# Patient Record
Sex: FEMALE | Race: OTHER | NOT HISPANIC OR LATINO | URBAN - METROPOLITAN AREA
[De-identification: names, ages, dates, MRNs, and addresses within clinical notes are randomized per-mention and may not be internally consistent; named-entity substitution may affect disease eponyms.]

---

## 2018-09-22 ENCOUNTER — OUTPATIENT (OUTPATIENT)
Dept: OUTPATIENT SERVICES | Facility: HOSPITAL | Age: 33
LOS: 1 days | End: 2018-09-22
Payer: MEDICAID

## 2018-09-22 ENCOUNTER — TRANSCRIPTION ENCOUNTER (OUTPATIENT)
Age: 33
End: 2018-09-22

## 2018-09-22 DIAGNOSIS — O26.899 OTHER SPECIFIED PREGNANCY RELATED CONDITIONS, UNSPECIFIED TRIMESTER: ICD-10-CM

## 2018-09-22 DIAGNOSIS — Z3A.00 WEEKS OF GESTATION OF PREGNANCY NOT SPECIFIED: ICD-10-CM

## 2018-09-22 LAB
ALBUMIN SERPL ELPH-MCNC: 2.8 G/DL — LOW (ref 3.5–5)
ALP SERPL-CCNC: 118 U/L — SIGNIFICANT CHANGE UP (ref 40–120)
ALT FLD-CCNC: 22 U/L DA — SIGNIFICANT CHANGE UP (ref 10–60)
ANION GAP SERPL CALC-SCNC: 11 MMOL/L — SIGNIFICANT CHANGE UP (ref 5–17)
APTT BLD: 31 SEC — SIGNIFICANT CHANGE UP (ref 27.5–37.4)
AST SERPL-CCNC: 14 U/L — SIGNIFICANT CHANGE UP (ref 10–40)
BILIRUB SERPL-MCNC: 0.5 MG/DL — SIGNIFICANT CHANGE UP (ref 0.2–1.2)
BUN SERPL-MCNC: 11 MG/DL — SIGNIFICANT CHANGE UP (ref 7–18)
CALCIUM SERPL-MCNC: 9 MG/DL — SIGNIFICANT CHANGE UP (ref 8.4–10.5)
CHLORIDE SERPL-SCNC: 107 MMOL/L — SIGNIFICANT CHANGE UP (ref 96–108)
CO2 SERPL-SCNC: 22 MMOL/L — SIGNIFICANT CHANGE UP (ref 22–31)
CREAT SERPL-MCNC: 0.58 MG/DL — SIGNIFICANT CHANGE UP (ref 0.5–1.3)
FIBRINOGEN PPP-MCNC: 624 MG/DL — HIGH (ref 310–510)
GLUCOSE SERPL-MCNC: 101 MG/DL — HIGH (ref 70–99)
HCT VFR BLD CALC: 46.7 % — HIGH (ref 34.5–45)
HGB BLD-MCNC: 14.9 G/DL — SIGNIFICANT CHANGE UP (ref 11.5–15.5)
INR BLD: 0.96 RATIO — SIGNIFICANT CHANGE UP (ref 0.88–1.16)
MCHC RBC-ENTMCNC: 29.8 PG — SIGNIFICANT CHANGE UP (ref 27–34)
MCHC RBC-ENTMCNC: 31.9 GM/DL — LOW (ref 32–36)
MCV RBC AUTO: 93.6 FL — SIGNIFICANT CHANGE UP (ref 80–100)
PLATELET # BLD AUTO: 282 K/UL — SIGNIFICANT CHANGE UP (ref 150–400)
POTASSIUM SERPL-MCNC: 4 MMOL/L — SIGNIFICANT CHANGE UP (ref 3.5–5.3)
POTASSIUM SERPL-SCNC: 4 MMOL/L — SIGNIFICANT CHANGE UP (ref 3.5–5.3)
PROT SERPL-MCNC: 7.5 G/DL — SIGNIFICANT CHANGE UP (ref 6–8.3)
PROTHROM AB SERPL-ACNC: 10.5 SEC — SIGNIFICANT CHANGE UP (ref 9.8–12.7)
RBC # BLD: 4.99 M/UL — SIGNIFICANT CHANGE UP (ref 3.8–5.2)
RBC # FLD: 13.3 % — SIGNIFICANT CHANGE UP (ref 10.3–14.5)
SODIUM SERPL-SCNC: 140 MMOL/L — SIGNIFICANT CHANGE UP (ref 135–145)
WBC # BLD: 10.2 K/UL — SIGNIFICANT CHANGE UP (ref 3.8–10.5)
WBC # FLD AUTO: 10.2 K/UL — SIGNIFICANT CHANGE UP (ref 3.8–10.5)

## 2018-09-23 ENCOUNTER — INPATIENT (INPATIENT)
Facility: HOSPITAL | Age: 33
LOS: 2 days | Discharge: HOME | End: 2018-09-26
Attending: OBSTETRICS & GYNECOLOGY | Admitting: OBSTETRICS & GYNECOLOGY
Payer: MEDICAID

## 2018-09-23 VITALS — HEIGHT: 67 IN | WEIGHT: 220.46 LBS

## 2018-09-23 DIAGNOSIS — Z3A.39 39 WEEKS GESTATION OF PREGNANCY: ICD-10-CM

## 2018-09-23 LAB
ABO RH CONFIRMATION: SIGNIFICANT CHANGE UP
T PALLIDUM AB TITR SER: NEGATIVE — SIGNIFICANT CHANGE UP

## 2018-09-23 PROCEDURE — 85730 THROMBOPLASTIN TIME PARTIAL: CPT

## 2018-09-23 PROCEDURE — 80053 COMPREHEN METABOLIC PANEL: CPT

## 2018-09-23 PROCEDURE — 86850 RBC ANTIBODY SCREEN: CPT

## 2018-09-23 PROCEDURE — G0463: CPT

## 2018-09-23 PROCEDURE — 86780 TREPONEMA PALLIDUM: CPT

## 2018-09-23 PROCEDURE — 85384 FIBRINOGEN ACTIVITY: CPT

## 2018-09-23 PROCEDURE — 86900 BLOOD TYPING SEROLOGIC ABO: CPT

## 2018-09-23 PROCEDURE — 85610 PROTHROMBIN TIME: CPT

## 2018-09-23 PROCEDURE — 59025 FETAL NON-STRESS TEST: CPT

## 2018-09-23 PROCEDURE — 85027 COMPLETE CBC AUTOMATED: CPT

## 2018-09-23 PROCEDURE — 86923 COMPATIBILITY TEST ELECTRIC: CPT

## 2018-09-23 PROCEDURE — 86901 BLOOD TYPING SEROLOGIC RH(D): CPT

## 2018-09-23 PROCEDURE — 36415 COLL VENOUS BLD VENIPUNCTURE: CPT

## 2018-09-23 RX ORDER — TETANUS TOXOID, REDUCED DIPHTHERIA TOXOID AND ACELLULAR PERTUSSIS VACCINE, ADSORBED 5; 2.5; 8; 8; 2.5 [IU]/.5ML; [IU]/.5ML; UG/.5ML; UG/.5ML; UG/.5ML
0.5 SUSPENSION INTRAMUSCULAR ONCE
Qty: 0 | Refills: 0 | Status: DISCONTINUED | OUTPATIENT
Start: 2018-09-23 | End: 2018-09-26

## 2018-09-23 RX ORDER — OXYTOCIN 10 UNIT/ML
41.67 VIAL (ML) INJECTION
Qty: 20 | Refills: 0 | Status: DISCONTINUED | OUTPATIENT
Start: 2018-09-23 | End: 2018-09-26

## 2018-09-23 RX ORDER — SIMETHICONE 80 MG/1
80 TABLET, CHEWABLE ORAL EVERY 4 HOURS
Qty: 0 | Refills: 0 | Status: DISCONTINUED | OUTPATIENT
Start: 2018-09-23 | End: 2018-09-26

## 2018-09-23 RX ORDER — DIPHENHYDRAMINE HCL 50 MG
25 CAPSULE ORAL EVERY 6 HOURS
Qty: 0 | Refills: 0 | Status: DISCONTINUED | OUTPATIENT
Start: 2018-09-23 | End: 2018-09-26

## 2018-09-23 RX ORDER — CITRIC ACID/SODIUM CITRATE 300-500 MG
30 SOLUTION, ORAL ORAL ONCE
Qty: 0 | Refills: 0 | Status: COMPLETED | OUTPATIENT
Start: 2018-09-23 | End: 2018-09-23

## 2018-09-23 RX ORDER — HEPARIN SODIUM 5000 [USP'U]/ML
5000 INJECTION INTRAVENOUS; SUBCUTANEOUS EVERY 12 HOURS
Qty: 0 | Refills: 0 | Status: DISCONTINUED | OUTPATIENT
Start: 2018-09-23 | End: 2018-09-26

## 2018-09-23 RX ORDER — KETOROLAC TROMETHAMINE 30 MG/ML
30 SYRINGE (ML) INJECTION EVERY 6 HOURS
Qty: 0 | Refills: 0 | Status: DISCONTINUED | OUTPATIENT
Start: 2018-09-23 | End: 2018-09-24

## 2018-09-23 RX ORDER — METOCLOPRAMIDE HCL 10 MG
10 TABLET ORAL ONCE
Qty: 0 | Refills: 0 | Status: DISCONTINUED | OUTPATIENT
Start: 2018-09-23 | End: 2018-09-26

## 2018-09-23 RX ORDER — GLYCERIN ADULT
1 SUPPOSITORY, RECTAL RECTAL AT BEDTIME
Qty: 0 | Refills: 0 | Status: DISCONTINUED | OUTPATIENT
Start: 2018-09-23 | End: 2018-09-26

## 2018-09-23 RX ORDER — FERROUS SULFATE 325(65) MG
325 TABLET ORAL DAILY
Qty: 0 | Refills: 0 | Status: DISCONTINUED | OUTPATIENT
Start: 2018-09-23 | End: 2018-09-26

## 2018-09-23 RX ORDER — CITRIC ACID/SODIUM CITRATE 300-500 MG
30 SOLUTION, ORAL ORAL ONCE
Qty: 0 | Refills: 0 | Status: DISCONTINUED | OUTPATIENT
Start: 2018-09-23 | End: 2018-09-26

## 2018-09-23 RX ORDER — CEFAZOLIN SODIUM 1 G
2000 VIAL (EA) INJECTION ONCE
Qty: 0 | Refills: 0 | Status: COMPLETED | OUTPATIENT
Start: 2018-09-23 | End: 2018-09-23

## 2018-09-23 RX ORDER — SODIUM CHLORIDE 9 MG/ML
1000 INJECTION, SOLUTION INTRAVENOUS
Qty: 0 | Refills: 0 | Status: DISCONTINUED | OUTPATIENT
Start: 2018-09-23 | End: 2018-09-26

## 2018-09-23 RX ORDER — LANOLIN
1 OINTMENT (GRAM) TOPICAL
Qty: 0 | Refills: 0 | Status: DISCONTINUED | OUTPATIENT
Start: 2018-09-23 | End: 2018-09-26

## 2018-09-23 RX ORDER — SODIUM CHLORIDE 9 MG/ML
1000 INJECTION, SOLUTION INTRAVENOUS ONCE
Qty: 0 | Refills: 0 | Status: COMPLETED | OUTPATIENT
Start: 2018-09-23 | End: 2018-09-23

## 2018-09-23 RX ORDER — DOCUSATE SODIUM 100 MG
100 CAPSULE ORAL
Qty: 0 | Refills: 0 | Status: DISCONTINUED | OUTPATIENT
Start: 2018-09-23 | End: 2018-09-24

## 2018-09-23 RX ORDER — ACETAMINOPHEN 500 MG
1000 TABLET ORAL ONCE
Qty: 0 | Refills: 0 | Status: COMPLETED | OUTPATIENT
Start: 2018-09-23 | End: 2018-09-24

## 2018-09-23 RX ADMIN — SODIUM CHLORIDE 2000 MILLILITER(S): 9 INJECTION, SOLUTION INTRAVENOUS at 06:30

## 2018-09-23 RX ADMIN — Medication 30 MILLIGRAM(S): at 23:31

## 2018-09-23 RX ADMIN — HEPARIN SODIUM 5000 UNIT(S): 5000 INJECTION INTRAVENOUS; SUBCUTANEOUS at 22:12

## 2018-09-23 RX ADMIN — Medication 100 MILLIGRAM(S): at 07:37

## 2018-09-23 RX ADMIN — Medication 30 MILLIGRAM(S): at 15:07

## 2018-09-23 RX ADMIN — Medication 30 MILLIGRAM(S): at 15:37

## 2018-09-23 RX ADMIN — Medication 125 MILLIUNIT(S)/MIN: at 08:31

## 2018-09-23 RX ADMIN — Medication 30 MILLILITER(S): at 07:38

## 2018-09-23 NOTE — PROGRESS NOTE ADULT - SUBJECTIVE AND OBJECTIVE BOX
OB Post Op Note    Patient seen resting comfortably.  No new complaints.  Denies HA, CP, SOB, N/V/D, dizziness, palpitations, worsening abdominal pain, worsening vaginal bleeding, or any other concerns.  Attempting breastfeeding.      Vital Signs Last 24 Hrs  T(C): 36.5 (23 Sep 2018 11:49), Max: 36.5 (23 Sep 2018 09:16)  T(F): 97.7 (23 Sep 2018 11:49), Max: 97.7 (23 Sep 2018 09:16)  HR: 96 (23 Sep 2018 11:49) (93 - 101)  BP: 125/57 (23 Sep 2018 11:49) (106/58 - 125/57)  RR: 18 (23 Sep 2018 11:49) (15 - 18)  SpO2: 100% (23 Sep 2018 11:49) (98% - 100%)    Gen: A&O x 3, NAD  Chest: CTABL  Cardiac: S1+S2+ RRR  Breast: Soft, nontender, nonengorged  Abdomen: Nontender, nondistended, +BS, Dressing C/D/I  Gyn: Minimal bleeding  Extremities: Nontender, DTRS 2+, no worsening edema, venodynes intact        A/P:  Patient s/p  section, POD #0.     -Pain management as needed  -Advance as per protocol  -OOB and ambulate  -Repeat CBC   -DC bergman and trial of void  -Advance diet with flatus  -Encourage breastfeeding

## 2018-09-24 LAB
BASOPHILS # BLD AUTO: 0.1 K/UL — SIGNIFICANT CHANGE UP (ref 0–0.2)
BASOPHILS # BLD AUTO: 0.1 K/UL — SIGNIFICANT CHANGE UP (ref 0–0.2)
BASOPHILS NFR BLD AUTO: 0.5 % — SIGNIFICANT CHANGE UP (ref 0–2)
BASOPHILS NFR BLD AUTO: 0.7 % — SIGNIFICANT CHANGE UP (ref 0–2)
EOSINOPHIL # BLD AUTO: 0.1 K/UL — SIGNIFICANT CHANGE UP (ref 0–0.5)
EOSINOPHIL # BLD AUTO: 0.2 K/UL — SIGNIFICANT CHANGE UP (ref 0–0.5)
EOSINOPHIL NFR BLD AUTO: 1.3 % — SIGNIFICANT CHANGE UP (ref 0–6)
EOSINOPHIL NFR BLD AUTO: 1.9 % — SIGNIFICANT CHANGE UP (ref 0–6)
HCT VFR BLD CALC: 38.1 % — SIGNIFICANT CHANGE UP (ref 34.5–45)
HCT VFR BLD CALC: 38.8 % — SIGNIFICANT CHANGE UP (ref 34.5–45)
HGB BLD-MCNC: 12.2 G/DL — SIGNIFICANT CHANGE UP (ref 11.5–15.5)
HGB BLD-MCNC: 12.6 G/DL — SIGNIFICANT CHANGE UP (ref 11.5–15.5)
LYMPHOCYTES # BLD AUTO: 18.7 % — SIGNIFICANT CHANGE UP (ref 13–44)
LYMPHOCYTES # BLD AUTO: 2 K/UL — SIGNIFICANT CHANGE UP (ref 1–3.3)
LYMPHOCYTES # BLD AUTO: 2.6 K/UL — SIGNIFICANT CHANGE UP (ref 1–3.3)
LYMPHOCYTES # BLD AUTO: 25 % — SIGNIFICANT CHANGE UP (ref 13–44)
MCHC RBC-ENTMCNC: 29.6 PG — SIGNIFICANT CHANGE UP (ref 27–34)
MCHC RBC-ENTMCNC: 30.6 PG — SIGNIFICANT CHANGE UP (ref 27–34)
MCHC RBC-ENTMCNC: 31.5 GM/DL — LOW (ref 32–36)
MCHC RBC-ENTMCNC: 33.2 GM/DL — SIGNIFICANT CHANGE UP (ref 32–36)
MCV RBC AUTO: 92.4 FL — SIGNIFICANT CHANGE UP (ref 80–100)
MCV RBC AUTO: 94.1 FL — SIGNIFICANT CHANGE UP (ref 80–100)
MONOCYTES # BLD AUTO: 0.8 K/UL — SIGNIFICANT CHANGE UP (ref 0–0.9)
MONOCYTES # BLD AUTO: 0.9 K/UL — SIGNIFICANT CHANGE UP (ref 0–0.9)
MONOCYTES NFR BLD AUTO: 8 % — SIGNIFICANT CHANGE UP (ref 2–14)
MONOCYTES NFR BLD AUTO: 8.7 % — SIGNIFICANT CHANGE UP (ref 2–14)
NEUTROPHILS # BLD AUTO: 6.6 K/UL — SIGNIFICANT CHANGE UP (ref 1.8–7.4)
NEUTROPHILS # BLD AUTO: 7.5 K/UL — HIGH (ref 1.8–7.4)
NEUTROPHILS NFR BLD AUTO: 63.6 % — SIGNIFICANT CHANGE UP (ref 43–77)
NEUTROPHILS NFR BLD AUTO: 71.4 % — SIGNIFICANT CHANGE UP (ref 43–77)
PLATELET # BLD AUTO: 244 K/UL — SIGNIFICANT CHANGE UP (ref 150–400)
PLATELET # BLD AUTO: 259 K/UL — SIGNIFICANT CHANGE UP (ref 150–400)
RBC # BLD: 4.12 M/UL — SIGNIFICANT CHANGE UP (ref 3.8–5.2)
RBC # BLD: 4.13 M/UL — SIGNIFICANT CHANGE UP (ref 3.8–5.2)
RBC # FLD: 13 % — SIGNIFICANT CHANGE UP (ref 10.3–14.5)
RBC # FLD: 13.2 % — SIGNIFICANT CHANGE UP (ref 10.3–14.5)
WBC # BLD: 10.4 K/UL — SIGNIFICANT CHANGE UP (ref 3.8–10.5)
WBC # BLD: 10.5 K/UL — SIGNIFICANT CHANGE UP (ref 3.8–10.5)
WBC # FLD AUTO: 10.4 K/UL — SIGNIFICANT CHANGE UP (ref 3.8–10.5)
WBC # FLD AUTO: 10.5 K/UL — SIGNIFICANT CHANGE UP (ref 3.8–10.5)

## 2018-09-24 RX ORDER — MAGNESIUM HYDROXIDE 400 MG/1
30 TABLET, CHEWABLE ORAL DAILY
Qty: 0 | Refills: 0 | Status: DISCONTINUED | OUTPATIENT
Start: 2018-09-25 | End: 2018-09-26

## 2018-09-24 RX ORDER — OXYCODONE AND ACETAMINOPHEN 5; 325 MG/1; MG/1
2 TABLET ORAL EVERY 6 HOURS
Qty: 0 | Refills: 0 | Status: DISCONTINUED | OUTPATIENT
Start: 2018-09-24 | End: 2018-09-26

## 2018-09-24 RX ORDER — SENNA PLUS 8.6 MG/1
2 TABLET ORAL AT BEDTIME
Qty: 0 | Refills: 0 | Status: DISCONTINUED | OUTPATIENT
Start: 2018-09-24 | End: 2018-09-26

## 2018-09-24 RX ORDER — OXYCODONE AND ACETAMINOPHEN 5; 325 MG/1; MG/1
1 TABLET ORAL EVERY 4 HOURS
Qty: 0 | Refills: 0 | Status: DISCONTINUED | OUTPATIENT
Start: 2018-09-24 | End: 2018-09-26

## 2018-09-24 RX ORDER — IBUPROFEN 200 MG
600 TABLET ORAL EVERY 6 HOURS
Qty: 0 | Refills: 0 | Status: DISCONTINUED | OUTPATIENT
Start: 2018-09-24 | End: 2018-09-26

## 2018-09-24 RX ORDER — DOCUSATE SODIUM 100 MG
100 CAPSULE ORAL
Qty: 0 | Refills: 0 | Status: DISCONTINUED | OUTPATIENT
Start: 2018-09-24 | End: 2018-09-26

## 2018-09-24 RX ADMIN — OXYCODONE AND ACETAMINOPHEN 1 TABLET(S): 5; 325 TABLET ORAL at 15:30

## 2018-09-24 RX ADMIN — SENNA PLUS 2 TABLET(S): 8.6 TABLET ORAL at 22:18

## 2018-09-24 RX ADMIN — Medication 30 MILLIGRAM(S): at 06:09

## 2018-09-24 RX ADMIN — HEPARIN SODIUM 5000 UNIT(S): 5000 INJECTION INTRAVENOUS; SUBCUTANEOUS at 10:33

## 2018-09-24 RX ADMIN — OXYCODONE AND ACETAMINOPHEN 1 TABLET(S): 5; 325 TABLET ORAL at 14:23

## 2018-09-24 RX ADMIN — Medication 400 MILLIGRAM(S): at 10:30

## 2018-09-24 RX ADMIN — Medication 1 TABLET(S): at 12:31

## 2018-09-24 RX ADMIN — OXYCODONE AND ACETAMINOPHEN 1 TABLET(S): 5; 325 TABLET ORAL at 20:05

## 2018-09-24 RX ADMIN — OXYCODONE AND ACETAMINOPHEN 1 TABLET(S): 5; 325 TABLET ORAL at 19:35

## 2018-09-24 RX ADMIN — Medication 325 MILLIGRAM(S): at 12:31

## 2018-09-24 RX ADMIN — Medication 30 MILLIGRAM(S): at 06:45

## 2018-09-24 RX ADMIN — SIMETHICONE 80 MILLIGRAM(S): 80 TABLET, CHEWABLE ORAL at 19:35

## 2018-09-24 RX ADMIN — Medication 600 MILLIGRAM(S): at 14:23

## 2018-09-24 RX ADMIN — Medication 30 MILLIGRAM(S): at 00:05

## 2018-09-24 RX ADMIN — HEPARIN SODIUM 5000 UNIT(S): 5000 INJECTION INTRAVENOUS; SUBCUTANEOUS at 22:17

## 2018-09-24 RX ADMIN — Medication 1000 MILLIGRAM(S): at 11:30

## 2018-09-24 RX ADMIN — Medication 600 MILLIGRAM(S): at 15:30

## 2018-09-24 NOTE — PROGRESS NOTE ADULT - PROBLEM SELECTOR PLAN 1
- Pain management as needed  - Advance diet s/p flatus   - OOB and ambulate  - Incentive spirometry  - Repeat CBC in am   - Encourage breastfeeding

## 2018-09-24 NOTE — PROGRESS NOTE ADULT - SUBJECTIVE AND OBJECTIVE BOX
Patient seen resting comfortably.  No new complaints. Reports incisional pain, controlled on pain medications.  Denies CP, SOB, N/V/D, dizziness, palpitations. Voiding spontaneously, denies dysuria, urgency or frequency. +Flatus, - BM. Tolerating clears, advancing diet this AM    Vital Signs Last 24 Hrs  T(C): 37.1 (24 Sep 2018 06:00), Max: 37.1 (23 Sep 2018 19:00)  T(F): 98.7 (24 Sep 2018 06:00), Max: 98.7 (23 Sep 2018 19:00)  HR: 92 (24 Sep 2018 06:00) (82 - 101)  BP: 100/60 (24 Sep 2018 06:00) (100/60 - 125/57)  RR: 16 (24 Sep 2018 06:00) (15 - 18)  SpO2: 99% (24 Sep 2018 06:00) (98% - 100%)    Gen: A&O x 3, NAD, large body habitus  Chest: CTABL  Cardiac: S1+S2+ RRR  Breast: Soft, nontender, nonengorged  Abdomen: Nontender, nondistended, +BS, Incision c/d/i, no erythema  Gyn: lochia wnl  Extremities: Nontender, no worsening edema                          12.2   10.5  )-----------( 244      ( 24 Sep 2018 06:17 )             38.8

## 2018-09-25 ENCOUNTER — TRANSCRIPTION ENCOUNTER (OUTPATIENT)
Age: 33
End: 2018-09-25

## 2018-09-25 LAB
BASOPHILS # BLD AUTO: 0.1 K/UL — SIGNIFICANT CHANGE UP (ref 0–0.2)
BASOPHILS NFR BLD AUTO: 0.8 % — SIGNIFICANT CHANGE UP (ref 0–2)
EOSINOPHIL # BLD AUTO: 0.2 K/UL — SIGNIFICANT CHANGE UP (ref 0–0.5)
EOSINOPHIL NFR BLD AUTO: 2.4 % — SIGNIFICANT CHANGE UP (ref 0–6)
HCT VFR BLD CALC: 38 % — SIGNIFICANT CHANGE UP (ref 34.5–45)
HGB BLD-MCNC: 12.3 G/DL — SIGNIFICANT CHANGE UP (ref 11.5–15.5)
LYMPHOCYTES # BLD AUTO: 2.7 K/UL — SIGNIFICANT CHANGE UP (ref 1–3.3)
LYMPHOCYTES # BLD AUTO: 26.1 % — SIGNIFICANT CHANGE UP (ref 13–44)
MCHC RBC-ENTMCNC: 30.2 PG — SIGNIFICANT CHANGE UP (ref 27–34)
MCHC RBC-ENTMCNC: 32.3 GM/DL — SIGNIFICANT CHANGE UP (ref 32–36)
MCV RBC AUTO: 93.3 FL — SIGNIFICANT CHANGE UP (ref 80–100)
MONOCYTES # BLD AUTO: 1.1 K/UL — HIGH (ref 0–0.9)
MONOCYTES NFR BLD AUTO: 10.8 % — SIGNIFICANT CHANGE UP (ref 2–14)
NEUTROPHILS # BLD AUTO: 6.1 K/UL — SIGNIFICANT CHANGE UP (ref 1.8–7.4)
NEUTROPHILS NFR BLD AUTO: 60 % — SIGNIFICANT CHANGE UP (ref 43–77)
PLATELET # BLD AUTO: 248 K/UL — SIGNIFICANT CHANGE UP (ref 150–400)
RBC # BLD: 4.08 M/UL — SIGNIFICANT CHANGE UP (ref 3.8–5.2)
RBC # FLD: 13.2 % — SIGNIFICANT CHANGE UP (ref 10.3–14.5)
WBC # BLD: 10.2 K/UL — SIGNIFICANT CHANGE UP (ref 3.8–10.5)
WBC # FLD AUTO: 10.2 K/UL — SIGNIFICANT CHANGE UP (ref 3.8–10.5)

## 2018-09-25 RX ADMIN — OXYCODONE AND ACETAMINOPHEN 1 TABLET(S): 5; 325 TABLET ORAL at 18:53

## 2018-09-25 RX ADMIN — Medication 100 MILLIGRAM(S): at 18:04

## 2018-09-25 RX ADMIN — OXYCODONE AND ACETAMINOPHEN 1 TABLET(S): 5; 325 TABLET ORAL at 19:30

## 2018-09-25 RX ADMIN — Medication 600 MILLIGRAM(S): at 13:00

## 2018-09-25 RX ADMIN — Medication 600 MILLIGRAM(S): at 19:30

## 2018-09-25 RX ADMIN — SIMETHICONE 80 MILLIGRAM(S): 80 TABLET, CHEWABLE ORAL at 18:05

## 2018-09-25 RX ADMIN — HEPARIN SODIUM 5000 UNIT(S): 5000 INJECTION INTRAVENOUS; SUBCUTANEOUS at 06:19

## 2018-09-25 RX ADMIN — Medication 100 MILLIGRAM(S): at 06:19

## 2018-09-25 RX ADMIN — Medication 600 MILLIGRAM(S): at 03:12

## 2018-09-25 RX ADMIN — HEPARIN SODIUM 5000 UNIT(S): 5000 INJECTION INTRAVENOUS; SUBCUTANEOUS at 18:04

## 2018-09-25 RX ADMIN — Medication 325 MILLIGRAM(S): at 12:12

## 2018-09-25 RX ADMIN — SENNA PLUS 2 TABLET(S): 8.6 TABLET ORAL at 22:22

## 2018-09-25 RX ADMIN — OXYCODONE AND ACETAMINOPHEN 1 TABLET(S): 5; 325 TABLET ORAL at 12:13

## 2018-09-25 RX ADMIN — Medication 600 MILLIGRAM(S): at 03:43

## 2018-09-25 RX ADMIN — MAGNESIUM HYDROXIDE 30 MILLILITER(S): 400 TABLET, CHEWABLE ORAL at 12:12

## 2018-09-25 RX ADMIN — Medication 600 MILLIGRAM(S): at 12:12

## 2018-09-25 RX ADMIN — Medication 1 TABLET(S): at 12:12

## 2018-09-25 RX ADMIN — Medication 600 MILLIGRAM(S): at 18:54

## 2018-09-25 NOTE — PROGRESS NOTE ADULT - PROBLEM SELECTOR PLAN 1
-Pain management as needed  -cont post op care  -OOB and ambulate  - f/u Rpt CBC   -encourage incentive spirometer use  -Encourage breastfeeding   plan for baby circumcision as outpatient

## 2018-09-25 NOTE — PROGRESS NOTE ADULT - SUBJECTIVE AND OBJECTIVE BOX
Patient seen resting comfortably.  No new complaints. Reports incisional pain, controlled on pain medications.  Denies CP, SOB, N/V/D, dizziness, palpitations. Voiding spontaneously, denies dysuria, urgency or frequency. +Flatus, - BM. Tolerating regular diet.     Vital Signs Last 24 Hrs  T(C): 36.7 (25 Sep 2018 05:57), Max: 37.1 (24 Sep 2018 14:23)  T(F): 98.1 (25 Sep 2018 05:57), Max: 98.7 (24 Sep 2018 14:23)  HR: 86 (25 Sep 2018 05:57) (86 - 96)  BP: 133/83 (25 Sep 2018 05:57) (109/74 - 133/83)  RR: 17 (25 Sep 2018 05:57) (16 - 17)  SpO2: 97% (25 Sep 2018 05:57) (97% - 98%)    Gen: A&O x 3, NAD  Chest: CTABL  Cardiac: S1+S2+ RRR  Breast: Soft, nontender, nonengorged  Abdomen: Nontender, nondistended, +BS, Incision c/d/i, no erythema  Gyn: lochia wnl  Extremities: Nontender, no worsening edema                          12.3   10.2  )-----------( 248      ( 25 Sep 2018 06:38 )             38.0

## 2018-09-26 VITALS
OXYGEN SATURATION: 97 % | TEMPERATURE: 98 F | HEART RATE: 83 BPM | SYSTOLIC BLOOD PRESSURE: 122 MMHG | DIASTOLIC BLOOD PRESSURE: 81 MMHG | RESPIRATION RATE: 17 BRPM

## 2018-09-26 PROCEDURE — 59050 FETAL MONITOR W/REPORT: CPT

## 2018-09-26 PROCEDURE — 82962 GLUCOSE BLOOD TEST: CPT

## 2018-09-26 PROCEDURE — 85027 COMPLETE CBC AUTOMATED: CPT

## 2018-09-26 RX ORDER — IBUPROFEN 200 MG
1 TABLET ORAL
Qty: 20 | Refills: 0
Start: 2018-09-26 | End: 2018-09-30

## 2018-09-26 RX ORDER — SENNA PLUS 8.6 MG/1
2 TABLET ORAL
Qty: 10 | Refills: 0
Start: 2018-09-26 | End: 2018-09-30

## 2018-09-26 RX ORDER — DOCUSATE SODIUM 100 MG
1 CAPSULE ORAL
Qty: 10 | Refills: 0
Start: 2018-09-26 | End: 2018-09-30

## 2018-09-26 RX ADMIN — Medication 600 MILLIGRAM(S): at 12:19

## 2018-09-26 RX ADMIN — MAGNESIUM HYDROXIDE 30 MILLILITER(S): 400 TABLET, CHEWABLE ORAL at 12:21

## 2018-09-26 RX ADMIN — Medication 100 MILLIGRAM(S): at 06:12

## 2018-09-26 RX ADMIN — Medication 600 MILLIGRAM(S): at 14:41

## 2018-09-26 RX ADMIN — HEPARIN SODIUM 5000 UNIT(S): 5000 INJECTION INTRAVENOUS; SUBCUTANEOUS at 06:12

## 2018-09-26 RX ADMIN — Medication 325 MILLIGRAM(S): at 12:19

## 2018-09-26 RX ADMIN — OXYCODONE AND ACETAMINOPHEN 2 TABLET(S): 5; 325 TABLET ORAL at 05:19

## 2018-09-26 RX ADMIN — OXYCODONE AND ACETAMINOPHEN 2 TABLET(S): 5; 325 TABLET ORAL at 04:49

## 2018-09-26 RX ADMIN — SIMETHICONE 80 MILLIGRAM(S): 80 TABLET, CHEWABLE ORAL at 04:50

## 2018-09-26 RX ADMIN — Medication 1 TABLET(S): at 12:19

## 2018-09-26 NOTE — DISCHARGE NOTE OB - PLAN OF CARE
postop care and pain mgmt Pelvic rest for 5-6 weeks, nothing in vagina- no sex, no tampons. Avoid heavy lifting, no strenuous activities. Motrin as needed for pain. Regular diet as tolerated. Keep incision clean and dry. Shower only.   Follow up in office 2weeks.

## 2018-09-26 NOTE — PROGRESS NOTE ADULT - SUBJECTIVE AND OBJECTIVE BOX
Patient evaluated at bedside, offers no acute complaints.  Resting comfortably with baby at bedside.  Tolerating regular diet, Voiding without difficulty; +flatus, -BM  Currently breast and bottlefeeding.  Denies fever/chills, nausea/vomiting, headache, dizziness, chest pains, shortness of breath, palpitations    Vital Signs Last 24 Hrs  T(C): 36.7 (26 Sep 2018 06:00), Max: 37 (25 Sep 2018 19:45)  T(F): 98 (26 Sep 2018 06:00), Max: 98.6 (25 Sep 2018 19:45)  HR: 83 (26 Sep 2018 06:00) (83 - 92)  BP: 122/81 (26 Sep 2018 06:00) (117/71 - 133/89)  BP(mean): --  RR: 17 (26 Sep 2018 06:00) (14 - 17)  SpO2: 97% (26 Sep 2018 06:00) (97% - 100%)    PE: Pt appears comfortable, NAD, AAOx3  Chest: CTA bilaterally, no W/R/R  Cardiac: RRR  Breasts: soft, NT/nonengorged bilaterally; no masses, no erythema  Abd: soft; NT; no guarding or rebound; +BS x4 quad; Fundus firm NT; incision C/D/I with steristrips in place, no erythema, no wound drainage or bleeding, no swelling  Gyn: minimal  Ext: soft, NT; DTRs 2+ bilaterally; no worsening edema                          12.3   10.2  )-----------( 248      ( 25 Sep 2018 06:38 )             38.0       d/w Dr. Olanescu

## 2018-09-26 NOTE — DISCHARGE NOTE OB - PAIN PRESENT
No Yes/pain 5 out of 10, medicated with motrin 600 mg..patient states pain is still same after an hr . .offered a different pain medication in which patient refused ....s/p c/s ..pain is controlable with pain medication ...motrin 600 mg sent to pharmacy for  ..

## 2018-09-26 NOTE — DISCHARGE NOTE OB - PATIENT PORTAL LINK FT
You can access the [facility] Patient Portal, offered by [facility], by registering with the following website: [email or web address]

## 2018-09-26 NOTE — DISCHARGE NOTE OB - MEDICATION SUMMARY - MEDICATIONS TO TAKE
I will START or STAY ON the medications listed below when I get home from the hospital:    ibuprofen 600 mg oral tablet  -- 1 tab(s) by mouth every 6 hours, As needed, Mild Pain (1 - 3)  -- Indication: For moderate pain    Prenatal Multivitamins with Folic Acid 1 mg oral tablet  -- 1 tab(s) by mouth once a day  -- Indication: For supplement    docusate sodium 100 mg oral capsule  -- 1 cap(s) by mouth 2 times a day, As Needed -for constipation   -- Indication: For stool softener    senna oral tablet  -- 2 tab(s) by mouth once a day (at bedtime), As Needed -for indigestion   -- Indication: For gas or indigestion

## 2018-09-26 NOTE — DISCHARGE NOTE OB - CARE PROVIDER_API CALL
Olanescu, Andrea D (MD), Obstetrics and Gynecology  [facility] [address], [place] [address]  Phone: [phone]  Fax: [phone]

## 2018-10-04 ENCOUNTER — EMERGENCY (EMERGENCY)
Facility: HOSPITAL | Age: 33
LOS: 1 days | Discharge: HOME | End: 2018-10-04
Attending: EMERGENCY MEDICINE
Payer: MEDICAID

## 2018-10-04 VITALS
RESPIRATION RATE: 18 BRPM | WEIGHT: 210.1 LBS | TEMPERATURE: 98 F | SYSTOLIC BLOOD PRESSURE: 113 MMHG | DIASTOLIC BLOOD PRESSURE: 86 MMHG | HEART RATE: 81 BPM | HEIGHT: 67 IN | OXYGEN SATURATION: 100 %

## 2018-10-04 PROCEDURE — G0463: CPT

## 2018-10-04 NOTE — ED PROVIDER NOTE - NS ED ROS FT
2020-Pt independently assessed. Agree with Candelario's 2015 assessment    0000-Pt independently assessed.  Agree with Doreen Noble RN's [id number] assessment
CONSTITUTIONAL: no fever, no chills   EYES: no visual changes, no eye pain   ENMT: no nasal congestion, no throat pain  CARDIOVASCULAR: no chest pain, no edema, no palpitations   RESPIRATORY: no shortness of breath, no cough   GASTROINTESTINAL: no abdominal pain, no nausea, no vomiting, no diarrhea, +constipation   GENITOURINARY: no dysuria, no frequency  MUSCULOSKELETAL: no joint pains, no myalgias, no back pain   SKIN: no rashes, +wound drainage   NEUROLOGICAL: no weakness, no headache, no dizziness, no slurred speech, no syncope   PSYCHIATRIC: no known mental health illness   HEME/LYMPH: no lymphadenopathy      All other ROS negative except as per HPI

## 2018-10-04 NOTE — ED PROVIDER NOTE - OBJECTIVE STATEMENT
33 y/o F no PMHx or PSHx presents to ED 10 days s/p . Pt endorses small amount of drainage on right side of wound, she called OB-GYN and recommended she get evaluated. Pt also endorses some constipation but denies fever, nausea, vaginal bleeding or discharge. NKDA.

## 2018-10-04 NOTE — ED ADULT NURSE NOTE - OBJECTIVE STATEMENT
Pt came for a wound check. Pt had a  11 days ago and has some drainage. Pt also complains of having constipation.

## 2018-10-04 NOTE — ED PROVIDER NOTE - MEDICAL DECISION MAKING DETAILS
33 y/o F with s/p  and drainage from surgical wound. No signs of infection on exam. Will have OB-GYN evaluation and likely DC home.

## 2018-10-04 NOTE — ED ADULT NURSE NOTE - NSIMPLEMENTINTERV_GEN_ALL_ED
Implemented All Universal Safety Interventions:  [place] to call system. Call bell, personal items and telephone within reach. Instruct patient to call for assistance. Room bathroom lighting operational. Non-slip footwear when patient is off stretcher. Physically safe environment: no spills, clutter or unnecessary equipment. Stretcher in lowest position, wheels locked, appropriate side rails in place.

## 2018-10-04 NOTE — ED PROVIDER NOTE - PHYSICAL EXAMINATION
GENERAL: wells appearing, no acute distress   HEAD: atraumatic   EYES: EOMI, pink conjunctiva   ENT: moist oral mucosa   CARDIAC: RRR, no edema, distal pulses present   RESPIRATORY: lungs CTAB, no increased work of breathing   GASTROINTESTINAL: +abdominal tenderness at horizontal surgical incision, steri strips in place, scant serous drainage to R side, no bleeding or purulence or dehiscence, no rebound or guarding, bowel sounds presents  GENITOURINARY: no CVA tenderness   MUSCULOSKELETAL: no deformity   NEUROLOGICAL: AAOx3, spontaneous movement of extremities   SKIN: intact   PSYCHIATRIC: cooperative  HEME LYMPH: no lymphadenopathy

## 2019-04-15 ENCOUNTER — EMERGENCY (EMERGENCY)
Facility: HOSPITAL | Age: 34
LOS: 1 days | Discharge: HOME | End: 2019-04-15
Attending: EMERGENCY MEDICINE
Payer: MEDICAID

## 2019-04-15 VITALS
TEMPERATURE: 98 F | SYSTOLIC BLOOD PRESSURE: 121 MMHG | HEIGHT: 67 IN | DIASTOLIC BLOOD PRESSURE: 85 MMHG | OXYGEN SATURATION: 97 % | WEIGHT: 209.44 LBS | RESPIRATION RATE: 16 BRPM | HEART RATE: 100 BPM

## 2019-04-15 PROCEDURE — 99284 EMERGENCY DEPT VISIT MOD MDM: CPT | Mod: 25

## 2019-04-15 RX ORDER — SODIUM CHLORIDE 9 MG/ML
1000 INJECTION INTRAMUSCULAR; INTRAVENOUS; SUBCUTANEOUS
Qty: 0 | Refills: 0 | Status: DISCONTINUED | OUTPATIENT
Start: 2019-04-15 | End: 2019-04-19

## 2019-04-15 RX ORDER — SODIUM CHLORIDE 9 MG/ML
3 INJECTION INTRAMUSCULAR; INTRAVENOUS; SUBCUTANEOUS ONCE
Qty: 0 | Refills: 0 | Status: COMPLETED | OUTPATIENT
Start: 2019-04-15 | End: 2019-04-15

## 2019-04-15 RX ORDER — SODIUM CHLORIDE 9 MG/ML
1000 INJECTION INTRAMUSCULAR; INTRAVENOUS; SUBCUTANEOUS ONCE
Qty: 0 | Refills: 0 | Status: COMPLETED | OUTPATIENT
Start: 2019-04-15 | End: 2019-04-15

## 2019-04-16 VITALS
SYSTOLIC BLOOD PRESSURE: 128 MMHG | TEMPERATURE: 98 F | OXYGEN SATURATION: 98 % | HEART RATE: 87 BPM | DIASTOLIC BLOOD PRESSURE: 87 MMHG | RESPIRATION RATE: 18 BRPM

## 2019-04-16 LAB
ALBUMIN SERPL ELPH-MCNC: 3.8 G/DL — SIGNIFICANT CHANGE UP (ref 3.5–5)
ALP SERPL-CCNC: 89 U/L — SIGNIFICANT CHANGE UP (ref 40–120)
ALT FLD-CCNC: 30 U/L DA — SIGNIFICANT CHANGE UP (ref 10–60)
ANION GAP SERPL CALC-SCNC: 4 MMOL/L — LOW (ref 5–17)
APPEARANCE UR: CLEAR — SIGNIFICANT CHANGE UP
AST SERPL-CCNC: 14 U/L — SIGNIFICANT CHANGE UP (ref 10–40)
BASOPHILS # BLD AUTO: 0.04 K/UL — SIGNIFICANT CHANGE UP (ref 0–0.2)
BASOPHILS NFR BLD AUTO: 0.4 % — SIGNIFICANT CHANGE UP (ref 0–2)
BILIRUB SERPL-MCNC: 0.4 MG/DL — SIGNIFICANT CHANGE UP (ref 0.2–1.2)
BILIRUB UR-MCNC: NEGATIVE — SIGNIFICANT CHANGE UP
BUN SERPL-MCNC: 12 MG/DL — SIGNIFICANT CHANGE UP (ref 7–18)
CALCIUM SERPL-MCNC: 9.1 MG/DL — SIGNIFICANT CHANGE UP (ref 8.4–10.5)
CHLORIDE SERPL-SCNC: 107 MMOL/L — SIGNIFICANT CHANGE UP (ref 96–108)
CO2 SERPL-SCNC: 27 MMOL/L — SIGNIFICANT CHANGE UP (ref 22–31)
COLOR SPEC: YELLOW — SIGNIFICANT CHANGE UP
CREAT SERPL-MCNC: 0.78 MG/DL — SIGNIFICANT CHANGE UP (ref 0.5–1.3)
DIFF PNL FLD: ABNORMAL
EOSINOPHIL # BLD AUTO: 0.14 K/UL — SIGNIFICANT CHANGE UP (ref 0–0.5)
EOSINOPHIL NFR BLD AUTO: 1.4 % — SIGNIFICANT CHANGE UP (ref 0–6)
GLUCOSE SERPL-MCNC: 97 MG/DL — SIGNIFICANT CHANGE UP (ref 70–99)
GLUCOSE UR QL: NEGATIVE — SIGNIFICANT CHANGE UP
HCG SERPL-ACNC: <1 MIU/ML — SIGNIFICANT CHANGE UP
HCT VFR BLD CALC: 41.5 % — SIGNIFICANT CHANGE UP (ref 34.5–45)
HGB BLD-MCNC: 13.5 G/DL — SIGNIFICANT CHANGE UP (ref 11.5–15.5)
IMM GRANULOCYTES NFR BLD AUTO: 0.3 % — SIGNIFICANT CHANGE UP (ref 0–1.5)
KETONES UR-MCNC: NEGATIVE — SIGNIFICANT CHANGE UP
LEUKOCYTE ESTERASE UR-ACNC: ABNORMAL
LIDOCAIN IGE QN: 116 U/L — SIGNIFICANT CHANGE UP (ref 73–393)
LYMPHOCYTES # BLD AUTO: 2.98 K/UL — SIGNIFICANT CHANGE UP (ref 1–3.3)
LYMPHOCYTES # BLD AUTO: 29.2 % — SIGNIFICANT CHANGE UP (ref 13–44)
MCHC RBC-ENTMCNC: 29.3 PG — SIGNIFICANT CHANGE UP (ref 27–34)
MCHC RBC-ENTMCNC: 32.5 GM/DL — SIGNIFICANT CHANGE UP (ref 32–36)
MCV RBC AUTO: 90.2 FL — SIGNIFICANT CHANGE UP (ref 80–100)
MONOCYTES # BLD AUTO: 0.84 K/UL — SIGNIFICANT CHANGE UP (ref 0–0.9)
MONOCYTES NFR BLD AUTO: 8.2 % — SIGNIFICANT CHANGE UP (ref 2–14)
NEUTROPHILS # BLD AUTO: 6.17 K/UL — SIGNIFICANT CHANGE UP (ref 1.8–7.4)
NEUTROPHILS NFR BLD AUTO: 60.5 % — SIGNIFICANT CHANGE UP (ref 43–77)
NITRITE UR-MCNC: NEGATIVE — SIGNIFICANT CHANGE UP
NRBC # BLD: 0 /100 WBCS — SIGNIFICANT CHANGE UP (ref 0–0)
PH UR: 6 — SIGNIFICANT CHANGE UP (ref 5–8)
PLATELET # BLD AUTO: 317 K/UL — SIGNIFICANT CHANGE UP (ref 150–400)
POTASSIUM SERPL-MCNC: 4.1 MMOL/L — SIGNIFICANT CHANGE UP (ref 3.5–5.3)
POTASSIUM SERPL-SCNC: 4.1 MMOL/L — SIGNIFICANT CHANGE UP (ref 3.5–5.3)
PROT SERPL-MCNC: 8.4 G/DL — HIGH (ref 6–8.3)
PROT UR-MCNC: NEGATIVE — SIGNIFICANT CHANGE UP
RBC # BLD: 4.6 M/UL — SIGNIFICANT CHANGE UP (ref 3.8–5.2)
RBC # FLD: 13.4 % — SIGNIFICANT CHANGE UP (ref 10.3–14.5)
SODIUM SERPL-SCNC: 138 MMOL/L — SIGNIFICANT CHANGE UP (ref 135–145)
SP GR SPEC: 1.02 — SIGNIFICANT CHANGE UP (ref 1.01–1.02)
UROBILINOGEN FLD QL: NEGATIVE — SIGNIFICANT CHANGE UP
WBC # BLD: 10.2 K/UL — SIGNIFICANT CHANGE UP (ref 3.8–10.5)
WBC # FLD AUTO: 10.2 K/UL — SIGNIFICANT CHANGE UP (ref 3.8–10.5)

## 2019-04-16 PROCEDURE — 85027 COMPLETE CBC AUTOMATED: CPT

## 2019-04-16 PROCEDURE — 81001 URINALYSIS AUTO W/SCOPE: CPT

## 2019-04-16 PROCEDURE — 80053 COMPREHEN METABOLIC PANEL: CPT

## 2019-04-16 PROCEDURE — 83690 ASSAY OF LIPASE: CPT

## 2019-04-16 PROCEDURE — 36415 COLL VENOUS BLD VENIPUNCTURE: CPT

## 2019-04-16 PROCEDURE — 96360 HYDRATION IV INFUSION INIT: CPT

## 2019-04-16 PROCEDURE — 96361 HYDRATE IV INFUSION ADD-ON: CPT

## 2019-04-16 PROCEDURE — 84702 CHORIONIC GONADOTROPIN TEST: CPT

## 2019-04-16 PROCEDURE — 99283 EMERGENCY DEPT VISIT LOW MDM: CPT | Mod: 25

## 2019-04-16 RX ADMIN — SODIUM CHLORIDE 1000 MILLILITER(S): 9 INJECTION INTRAMUSCULAR; INTRAVENOUS; SUBCUTANEOUS at 04:05

## 2019-04-16 RX ADMIN — SODIUM CHLORIDE 3 MILLILITER(S): 9 INJECTION INTRAMUSCULAR; INTRAVENOUS; SUBCUTANEOUS at 00:25

## 2019-04-16 RX ADMIN — SODIUM CHLORIDE 1000 MILLILITER(S): 9 INJECTION INTRAMUSCULAR; INTRAVENOUS; SUBCUTANEOUS at 04:06

## 2019-04-16 RX ADMIN — SODIUM CHLORIDE 125 MILLILITER(S): 9 INJECTION INTRAMUSCULAR; INTRAVENOUS; SUBCUTANEOUS at 00:25

## 2019-04-16 RX ADMIN — SODIUM CHLORIDE 1000 MILLILITER(S): 9 INJECTION INTRAMUSCULAR; INTRAVENOUS; SUBCUTANEOUS at 00:24

## 2019-04-16 NOTE — ED PROVIDER NOTE - NSFOLLOWUPINSTRUCTIONS_ED_ALL_ED_FT
Return to ER immediately if your abdominal pain does not improve, worsens, or persists, if you have fever, vomiting,  blood in stools or you have black stools, unable to eat or drink, have worsening/persistent diarrhea or constipation, any concerns. See your doctor as soon as possible (within 1-2 days).   If you need further assistance for appointments you can contact the [place] Care Coordinator at [phone]. In addition our outpatient [facility] is located at [address] [place] [address], tele: [phone].

## 2019-04-16 NOTE — ED PROVIDER NOTE - OBJECTIVE STATEMENT
Chief complaint of intermittent lower abdominal pain x 2 weeks. no fever, no vomiting. No vaginal bleeding or urinary symptoms.  Denies recent outside [place] travels, sick contacts or known spoiled food consumption.

## 2019-04-16 NOTE — ED PROVIDER NOTE - CLINICAL SUMMARY MEDICAL DECISION MAKING FREE TEXT BOX
Pt is well appearing, no abdominal guarding on full abdomen. WBC is WNL, pt is afebrile, drank fluids, no vomiting. Pt is well appearing walking with normal gait, stable for discharge and follow up with medical doctor. Pt educated on care and need for follow up. Discussed anticipatory guidance and return precautions. Questions answered. I had a detailed discussion with the patient and/or guardian regarding the historical points, exam findings, and any diagnostic results supporting the discharge diagnosis.

## 2019-04-16 NOTE — ED PROVIDER NOTE - CARE PROVIDER_API CALL
Universal Safety Interventions
Zulema Gomez)  Internal Medicine  [address] [place] [address], [place] [place] [address]  Phone: [phone]  Fax: [phone]  Follow Up Time:

## 2019-06-01 ENCOUNTER — OUTPATIENT (OUTPATIENT)
Dept: OUTPATIENT SERVICES | Facility: HOSPITAL | Age: 34
LOS: 1 days | End: 2019-06-01
Payer: MEDICAID

## 2019-06-09 ENCOUNTER — INPATIENT (INPATIENT)
Facility: HOSPITAL | Age: 34
LOS: 8 days | Discharge: PSYCHIATRIC OR SUBSTANCE USE FACILITY | DRG: 65 | End: 2019-06-18
Attending: INTERNAL MEDICINE | Admitting: INTERNAL MEDICINE
Payer: MEDICAID

## 2019-06-09 VITALS
RESPIRATION RATE: 20 BRPM | OXYGEN SATURATION: 98 % | WEIGHT: 169.98 LBS | HEIGHT: 66 IN | TEMPERATURE: 98 F | DIASTOLIC BLOOD PRESSURE: 80 MMHG | SYSTOLIC BLOOD PRESSURE: 111 MMHG | HEART RATE: 76 BPM

## 2019-06-09 DIAGNOSIS — Z29.9 ENCOUNTER FOR PROPHYLACTIC MEASURES, UNSPECIFIED: ICD-10-CM

## 2019-06-09 DIAGNOSIS — R47.01 APHASIA: ICD-10-CM

## 2019-06-09 LAB
ALBUMIN SERPL ELPH-MCNC: 3.8 G/DL — SIGNIFICANT CHANGE UP (ref 3.5–5)
ALP SERPL-CCNC: 82 U/L — SIGNIFICANT CHANGE UP (ref 40–120)
ALT FLD-CCNC: 20 U/L DA — SIGNIFICANT CHANGE UP (ref 10–60)
ANION GAP SERPL CALC-SCNC: 10 MMOL/L — SIGNIFICANT CHANGE UP (ref 5–17)
APAP SERPL-MCNC: <2 UG/ML — SIGNIFICANT CHANGE UP (ref 10–30)
APPEARANCE UR: CLEAR — SIGNIFICANT CHANGE UP
AST SERPL-CCNC: 8 U/L — LOW (ref 10–40)
BASE EXCESS BLDV CALC-SCNC: 0.6 MMOL/L — SIGNIFICANT CHANGE UP (ref -2–2)
BASOPHILS # BLD AUTO: 0.03 K/UL — SIGNIFICANT CHANGE UP (ref 0–0.2)
BASOPHILS NFR BLD AUTO: 0.4 % — SIGNIFICANT CHANGE UP (ref 0–2)
BILIRUB SERPL-MCNC: 0.7 MG/DL — SIGNIFICANT CHANGE UP (ref 0.2–1.2)
BILIRUB UR-MCNC: NEGATIVE — SIGNIFICANT CHANGE UP
BLOOD GAS COMMENTS, VENOUS: SIGNIFICANT CHANGE UP
BUN SERPL-MCNC: 11 MG/DL — SIGNIFICANT CHANGE UP (ref 7–18)
CALCIUM SERPL-MCNC: 8.8 MG/DL — SIGNIFICANT CHANGE UP (ref 8.4–10.5)
CHLORIDE SERPL-SCNC: 107 MMOL/L — SIGNIFICANT CHANGE UP (ref 96–108)
CO2 SERPL-SCNC: 23 MMOL/L — SIGNIFICANT CHANGE UP (ref 22–31)
COLOR SPEC: YELLOW — SIGNIFICANT CHANGE UP
CREAT SERPL-MCNC: 0.76 MG/DL — SIGNIFICANT CHANGE UP (ref 0.5–1.3)
DIFF PNL FLD: NEGATIVE — SIGNIFICANT CHANGE UP
EOSINOPHIL # BLD AUTO: 0.18 K/UL — SIGNIFICANT CHANGE UP (ref 0–0.5)
EOSINOPHIL NFR BLD AUTO: 2.3 % — SIGNIFICANT CHANGE UP (ref 0–6)
ETHANOL SERPL-MCNC: 5 MG/DL — SIGNIFICANT CHANGE UP (ref 0–10)
GLUCOSE SERPL-MCNC: 125 MG/DL — HIGH (ref 70–99)
GLUCOSE UR QL: NEGATIVE — SIGNIFICANT CHANGE UP
HCG SERPL-ACNC: <1 MIU/ML — SIGNIFICANT CHANGE UP
HCO3 BLDV-SCNC: 25 MMOL/L — SIGNIFICANT CHANGE UP (ref 21–29)
HCT VFR BLD CALC: 43.8 % — SIGNIFICANT CHANGE UP (ref 34.5–45)
HGB BLD-MCNC: 14.1 G/DL — SIGNIFICANT CHANGE UP (ref 11.5–15.5)
HOROWITZ INDEX BLDV+IHG-RTO: 21 — SIGNIFICANT CHANGE UP
IMM GRANULOCYTES NFR BLD AUTO: 0.5 % — SIGNIFICANT CHANGE UP (ref 0–1.5)
KETONES UR-MCNC: NEGATIVE — SIGNIFICANT CHANGE UP
LEUKOCYTE ESTERASE UR-ACNC: NEGATIVE — SIGNIFICANT CHANGE UP
LYMPHOCYTES # BLD AUTO: 2.24 K/UL — SIGNIFICANT CHANGE UP (ref 1–3.3)
LYMPHOCYTES # BLD AUTO: 28.2 % — SIGNIFICANT CHANGE UP (ref 13–44)
MCHC RBC-ENTMCNC: 29.4 PG — SIGNIFICANT CHANGE UP (ref 27–34)
MCHC RBC-ENTMCNC: 32.2 GM/DL — SIGNIFICANT CHANGE UP (ref 32–36)
MCV RBC AUTO: 91.4 FL — SIGNIFICANT CHANGE UP (ref 80–100)
MONOCYTES # BLD AUTO: 0.69 K/UL — SIGNIFICANT CHANGE UP (ref 0–0.9)
MONOCYTES NFR BLD AUTO: 8.7 % — SIGNIFICANT CHANGE UP (ref 2–14)
NEUTROPHILS # BLD AUTO: 4.77 K/UL — SIGNIFICANT CHANGE UP (ref 1.8–7.4)
NEUTROPHILS NFR BLD AUTO: 59.9 % — SIGNIFICANT CHANGE UP (ref 43–77)
NITRITE UR-MCNC: NEGATIVE — SIGNIFICANT CHANGE UP
NRBC # BLD: 0 /100 WBCS — SIGNIFICANT CHANGE UP (ref 0–0)
PCO2 BLDV: 42 MMHG — SIGNIFICANT CHANGE UP (ref 35–50)
PCP SPEC-MCNC: SIGNIFICANT CHANGE UP
PH BLDV: 7.39 — SIGNIFICANT CHANGE UP (ref 7.35–7.45)
PH UR: 5 — SIGNIFICANT CHANGE UP (ref 5–8)
PLATELET # BLD AUTO: 363 K/UL — SIGNIFICANT CHANGE UP (ref 150–400)
PO2 BLDV: 47 MMHG — HIGH (ref 25–45)
POTASSIUM SERPL-MCNC: 3.6 MMOL/L — SIGNIFICANT CHANGE UP (ref 3.5–5.3)
POTASSIUM SERPL-SCNC: 3.6 MMOL/L — SIGNIFICANT CHANGE UP (ref 3.5–5.3)
PROT SERPL-MCNC: 8.2 G/DL — SIGNIFICANT CHANGE UP (ref 6–8.3)
PROT UR-MCNC: 15
RBC # BLD: 4.79 M/UL — SIGNIFICANT CHANGE UP (ref 3.8–5.2)
RBC # FLD: 13.1 % — SIGNIFICANT CHANGE UP (ref 10.3–14.5)
SALICYLATES SERPL-MCNC: <1.7 MG/DL — LOW (ref 2.8–20)
SAO2 % BLDV: 80 % — SIGNIFICANT CHANGE UP (ref 67–88)
SODIUM SERPL-SCNC: 140 MMOL/L — SIGNIFICANT CHANGE UP (ref 135–145)
SP GR SPEC: 1.02 — SIGNIFICANT CHANGE UP (ref 1.01–1.02)
UROBILINOGEN FLD QL: 1
WBC # BLD: 7.95 K/UL — SIGNIFICANT CHANGE UP (ref 3.8–10.5)
WBC # FLD AUTO: 7.95 K/UL — SIGNIFICANT CHANGE UP (ref 3.8–10.5)

## 2019-06-09 PROCEDURE — 70544 MR ANGIOGRAPHY HEAD W/O DYE: CPT | Mod: 26

## 2019-06-09 PROCEDURE — 93010 ELECTROCARDIOGRAM REPORT: CPT

## 2019-06-09 PROCEDURE — 70450 CT HEAD/BRAIN W/O DYE: CPT | Mod: 26

## 2019-06-09 PROCEDURE — 99285 EMERGENCY DEPT VISIT HI MDM: CPT

## 2019-06-09 RX ORDER — ASPIRIN/CALCIUM CARB/MAGNESIUM 324 MG
81 TABLET ORAL DAILY
Refills: 0 | Status: DISCONTINUED | OUTPATIENT
Start: 2019-06-09 | End: 2019-06-18

## 2019-06-09 RX ORDER — ENOXAPARIN SODIUM 100 MG/ML
40 INJECTION SUBCUTANEOUS DAILY
Refills: 0 | Status: DISCONTINUED | OUTPATIENT
Start: 2019-06-09 | End: 2019-06-09

## 2019-06-09 RX ORDER — ATORVASTATIN CALCIUM 80 MG/1
40 TABLET, FILM COATED ORAL AT BEDTIME
Refills: 0 | Status: DISCONTINUED | OUTPATIENT
Start: 2019-06-09 | End: 2019-06-09

## 2019-06-09 RX ORDER — ATORVASTATIN CALCIUM 80 MG/1
40 TABLET, FILM COATED ORAL AT BEDTIME
Refills: 0 | Status: DISCONTINUED | OUTPATIENT
Start: 2019-06-09 | End: 2019-06-18

## 2019-06-09 RX ORDER — HEPARIN SODIUM 5000 [USP'U]/ML
5000 INJECTION INTRAVENOUS; SUBCUTANEOUS EVERY 8 HOURS
Refills: 0 | Status: DISCONTINUED | OUTPATIENT
Start: 2019-06-09 | End: 2019-06-18

## 2019-06-09 RX ORDER — ASPIRIN/CALCIUM CARB/MAGNESIUM 324 MG
81 TABLET ORAL DAILY
Refills: 0 | Status: DISCONTINUED | OUTPATIENT
Start: 2019-06-09 | End: 2019-06-09

## 2019-06-09 RX ADMIN — HEPARIN SODIUM 5000 UNIT(S): 5000 INJECTION INTRAVENOUS; SUBCUTANEOUS at 22:11

## 2019-06-09 RX ADMIN — Medication 81 MILLIGRAM(S): at 22:11

## 2019-06-09 RX ADMIN — ATORVASTATIN CALCIUM 40 MILLIGRAM(S): 80 TABLET, FILM COATED ORAL at 22:11

## 2019-06-09 NOTE — BEHAVIORAL HEALTH ASSESSMENT NOTE - NSBHREFERDETAILS_PSY_A_CORE_FT
HPI:  33 F pt with no significant PMHx brought to ED by  for nonverbal status, flat mood, drowsiness for past 3 days. History primarily obtained through . Inability to speak, mood changes, and drowsiness are abrupt changes from her baseline. Pt never had these set of symptoms. Pt is aware that she is not able to verbalize but denies having weakness, sensory changes, balancing problems, vision/hearing changes, except for BL hand numbness pt had last Thursday, which has resolved since then.  endorses pt acutely started having difficulty with short-term memory for the same duration. Denies personal or family history of psychiatric illnesses, CVA, or seizure. Denies depression, hallucinations, delusions, or history of taking neuroleptic or antipsychotic medications. Denies fever, chills, night sweats, recent travel history, sick contacts, abdominal pain, or urinary/bowel movement habit changes.    In ED, vitals are stable. EKG NSR. CT head showed BL focal hypodensities in thalami. MR angio head /wo contrast nonsignificant. UDS negative. Serum alcohol level nonsignificant. (09 Jun 2019 17:40)

## 2019-06-09 NOTE — H&P ADULT - NSHPLABSRESULTS_GEN_ALL_CORE
GENERAL: NAD  HEENT: Normocephalic;  conjunctivae and sclerae clear; moist mucous membranes;   NECK : supple  CHEST/LUNG: Clear to auscultation bilaterally with good air entry   HEART: S1 S2  regular; no murmurs, gallops or rubs  ABDOMEN: Soft, Nontender, Nondistended; Bowel sounds present  EXTREMITIES: no cyanosis; no edema; no calf tenderness  SKIN: warm and dry; no rash  NERVOUS SYSTEM:  Awake and alert; no new deficits; inability to speak

## 2019-06-09 NOTE — ED ADULT NURSE NOTE - NSIMPLEMENTINTERV_GEN_ALL_ED
Implemented All Universal Safety Interventions:  [place] to call system. Call bell, personal items and telephone within reach. Instruct patient to call for assistance. Room bathroom lighting operational. Non-slip footwear when patient is off stretcher. Physically safe environment: no spills, clutter or unnecessary equipment. Stretcher in lowest position, wheels locked, appropriate side rails in place.

## 2019-06-09 NOTE — H&P ADULT - PROBLEM SELECTOR PLAN 2
Prophylactic measure.  Plan: RISK                                                           Points  [] Previous VTE                                           3  [] Thrombophilia                                        2  [] Lower limb paralysis                              2   [] Current Cancer                                       2   [x] Immobilization > 24 hrs                        1  [] ICU/CCU stay > 24 hours                       1  [x] Age > 60                                                   1    IMPROVE VTE Score: 2    Patient is indicated for chemical DVT prophylaxis Prophylactic measure.  Plan: RISK                                                           Points  [] Previous VTE                                           3  [] Thrombophilia                                        2  [] Lower limb paralysis                              2   [] Current Cancer                                       2   [x] Immobilization > 24 hrs                        1  [] ICU/CCU stay > 24 hours                       1  [x] Age > 60                                                   1    IMPROVE VTE Score: 2    Heparin for chemical DVT prophylaxis

## 2019-06-09 NOTE — ED PROVIDER NOTE - CLINICAL SUMMARY MEDICAL DECISION MAKING FREE TEXT BOX
34 y/o F pt with acute onset of flat affect and muteness for 2 days. Will get medical clearance work up and consult tele-psych.

## 2019-06-09 NOTE — H&P ADULT - COMMENTS
CONSTITUTIONAL: No fever  EYES: No acute visual disturbances  NECK: No pain or stiffness  RESPIRATORY: No cough; No shortness of breath  CARDIOVASCULAR: No chest pain, no palpitations  GASTROINTESTINAL: No pain. No nausea or vomiting; No diarrhea   NEUROLOGICAL: No headache or numbness, no tremors; inability to speak  MUSCULOSKELETAL: No joint pain, no muscle pain  GENITOURINARY: No dysuria, no frequency, no hesitancy

## 2019-06-09 NOTE — H&P ADULT - HISTORY OF PRESENT ILLNESS
33 F pt with no significant PMHx brought to ED by  for nonverbal status, flat mood, drowsiness for past 3 days. History primarily obtained through . Inability to speak, mood changes, and drowsiness are abrupt changes from her baseline. Pt never had these set of symptoms. Pt is aware that she is not able to verbalize but denies having weakness, sensory changes, balancing problems, vision/hearing changes, except for BL hand numbness pt had last Thursday, which has resolved since then.  endorses pt acutely started having difficulty with short-term memory for the same duration. Denies personal or family history of psychiatric illnesses, CVA, or seizure. Denies depression, hallucinations, delusions, or history of taking neuroleptic or antipsychotic medications. Denies fever, chills, night sweats, recent travel history, sick contacts, abdominal pain, or urinary/bowel movement habit changes.    In ED, vitals are stable. EKG NSR. CT head showed BL focal hypodensities in thalami. MR angio head /wo contrast nonsignificant. UDS negative. Serum alcohol level nonsignificant.

## 2019-06-09 NOTE — ED PROVIDER NOTE - PROGRESS NOTE DETAILS
Spoke to telepsych, Dr. Boyce, and she requests CT head before evaluation, as well as still attempting to obtain urine from Pt.  Telepsych will eval once these tests are negative. Subsequently CT head shows b/l hypodensities in thalamus and spoke with radiologist and he advised obtaining MRA/MRV brain and MRI with contrast, which I ordered, however Pt only tolerated the MRA portion at this time, which radiologist read as no acute process, no vascular occlusion seen.  [facility] reports that Pt was uncooperative and got off the MRI table.  I was informed of this occurrence by Pt's nurse after the Pt was already brought back to the ED.  By that time, I spoke with MRI tech, named ANGELY, and he was already leaving for the day and there was no MRI available to try again until tomorrow.    -Paged on-call neurologist, Dr. Bustamante, for neuro consult, pending response.  Endorsed the need for neuro consult to admitting team.  -Pt has normal neuro exam other than being mute, and onset of symptoms 2 days ago, therefore no indication for tPA, code stroke, or telestroke evaluation.  NIH stroke scale documented, however it is of limited utility as Pt is minimally cooperative and non-verbal, and thus inaccurate.

## 2019-06-09 NOTE — BEHAVIORAL HEALTH ASSESSMENT NOTE - HPI (INCLUDE ILLNESS QUALITY, SEVERITY, DURATION, TIMING, CONTEXT, MODIFYING FACTORS, ASSOCIATED SIGNS AND SYMPTOMS)
33  [facility] female with no prior psych history  and no  significant PMHx brought to ED by  for nonverbal status, flat mood, drowsiness for past 3 days. reports pt has been sleeping all day c/o feeling tired, does not  phone calls, has not been gone to work in 3 day, difficulty with short-term memory for the same duration including her telephone number. Denies personal or family history of psychiatric illnesses, CVA, or seizure. Denies depression, hallucinations, delusions, or history of taking neuroleptic or antipsychotic medications. vitals are stable. EKG NSR. CT head showed BL focal hypodensities in thalami. MR angio head /wo contrast nonsignificant. UDS negative. Serum alcohol level nonsignificant.  MR head /w contrast, F/u MR venogram head /wo contrast pending. medical work in progress r/o organic cause  for acute change in mental status. pt on exam calm, refuse to engage in conversation initially but several  prompts pt communicated minimally. aaoself, did not know the date or address. denies mood sxs or psychosis. reports her 8 month old child was sick that is why she came to hospital but when asked she was admitted, she was unable to state the reason and keep saying random things- her mouth, oral, vitamin. pt had word finding difficulty .   spoke to her  for collaterals . reports has a 10 y/o child in [facility] living with  his mother in law. pt and her  had to leave [facility] in 2015 due to her  political issues with the Government since he wrote articles  against the Government as a . pt has been stressed out not able to see her child and her mother who is not well herself is taking care of her child. pt did not have post partum depression or psychosis with her both pregnancy. pt has been c/o feeling tired since 5 days and has not bene eating . her  has been taking care of their 8 month old child. pt reportedly forgot how to change the diaper.

## 2019-06-09 NOTE — BEHAVIORAL HEALTH ASSESSMENT NOTE - NSBHCHARTREVIEWLAB_PSY_A_CORE FT
CBC Full  -  ( 09 Jun 2019 12:22 )  WBC Count : 7.95 K/uL  RBC Count : 4.79 M/uL  Hemoglobin : 14.1 g/dL  Hematocrit : 43.8 %  Platelet Count - Automated : 363 K/uL  Mean Cell Volume : 91.4 fl  Mean Cell Hemoglobin : 29.4 pg  Mean Cell Hemoglobin Concentration : 32.2 gm/dL  Auto Neutrophil # : 4.77 K/uL  Auto Lymphocyte # : 2.24 K/uL  Auto Monocyte # : 0.69 K/uL  Auto Eosinophil # : 0.18 K/uL  Auto Basophil # : 0.03 K/uL  Auto Neutrophil % : 59.9 %  Auto Lymphocyte % : 28.2 %  Auto Monocyte % : 8.7 %  Auto Eosinophil % : 2.3 %  Auto Basophil % : 0.4 %  06-09    140  |  107  |  11  ----------------------------<  125<H>  3.6   |  23  |  0.76    Ca    8.8      09 Jun 2019 12:22    TPro  8.2  /  Alb  3.8  /  TBili  0.7  /  DBili  x   /  AST  8<L>  /  ALT  20  /  AlkPhos  82  06-09

## 2019-06-09 NOTE — H&P ADULT - PROBLEM SELECTOR PLAN 1
Nonverbal, flat mood, drowsiness, memory problem for past 3 days. Denies personal or family history of psychiatric illnesses, CVA, or seizure. CT head showed BL focal hypodensities in thalami. MR angio head /wo contrast nonsignificant. Denies personal or family history of psychiatric illnesses, CVA, or seizure. Denies depression, hallucinations, delusions, or history of taking neuroleptic or antipsychotic medications.  -Neuro Dr. Bustamante consulted  -F/u MR head /w contrast  -F/u MR venogram head /wo contrast  -F/u CD  -F/u TTE  -C/w ASA, statin until CVA ruled out Nonverbal, flat mood, drowsiness, memory problem for past 3 days. Denies personal or family history of psychiatric illnesses, CVA, or seizure. CT head showed BL focal hypodensities in thalami. MR angio head /wo contrast nonsignificant. Denies personal or family history of psychiatric illnesses, CVA, or seizure. Denies depression, hallucinations, delusions, or history of taking neuroleptic or antipsychotic medications.  -Neuro Dr. Bustamante consulted  -Psych Dr. Salas consulted  -F/u MR head /w contrast  -F/u MR venogram head /wo contrast  -F/u CD  -F/u TTE  -Not starting ASA, statin for now until intracranial mass ruled out by MR head Nonverbal, flat mood, drowsiness, memory problem for past 3 days. Denies personal or family history of psychiatric illnesses, CVA, or seizure. CT head showed BL focal hypodensities in thalami. MR angio head /wo contrast nonsignificant. Denies personal or family history of psychiatric illnesses, CVA, or seizure. Denies depression, hallucinations, delusions, or history of taking neuroleptic or antipsychotic medications.  -Neuro Dr. Bustamnate consulted  -Psych Dr. Salas consulted  -F/u MR head /w contrast  -F/u MR venogram head /wo contrast  -F/u CD  -F/u TTE  -Not starting ASA, statin for now until intracranial mass ruled out by MR head  -C/w neuro check Q4hrs -Patient is not verbalizing, flat mood, drowsiness, memory problem for past 2-3 days. Denies personal or family history of psychiatric illnesses, CVA, or seizure.   -CT head showed BL focal hypodensities in thalami. There are focal hypodensities involving bilateral thalami may be related to underlying ischemic infarct, toxic or metabolic processes, ischemic etiologies. There is mild subtle increased attenuation of the straight sinus, which is nonspecific. Other considerations include carbon monoxide poisoning.   -MR angio head /wo contrast nonsignificant.   -Utox negative, blood alcohol level not significant   -No personal or family history of psychiatric illnesses, CVA, or seizure. Denies depression, hallucinations, delusions, or history of taking neuroleptic or antipsychotic medications.  -Neuro Dr. Bustamante consulted, recommended to start aspirin and statin  -Psych Dr. Salas consulted  -F/u MR head /w contrast  -F/u MR venogram head /wo contrast  -F/u Carotid doppler  -F/u TTE  -Neuro check Q4hrs  -Monitor on tele -Patient is not verbalizing, flat mood, drowsiness, memory problem for past 2-3 days. Denies personal or family history of psychiatric illnesses, CVA, or seizure.   -CT head focal hypodensities involving bilateral thalami may be related to underlying ischemic infarct, toxic or metabolic processes, ischemic etiologies. There is mild subtle increased attenuation of the straight sinus, which is nonspecific. Other considerations include carbon monoxide poisoning.   -MR angio head /wo contrast nonsignificant.   -Utox negative, blood alcohol level not significant   -No personal or family history of psychiatric illnesses, CVA, or seizure. Denies depression, hallucinations, delusions, or history of taking neuroleptic or antipsychotic medications.  -Neuro Dr. Bustamante consulted, recommended to start aspirin and statin  -Psych Dr. Salas consulted  -F/u MR head /w contrast  -F/u MR venogram head /wo contrast  -F/u Carotid doppler  -F/u TTE  -Neuro check Q4hrs  -Monitor on tele

## 2019-06-10 DIAGNOSIS — G93.40 ENCEPHALOPATHY, UNSPECIFIED: ICD-10-CM

## 2019-06-10 LAB
24R-OH-CALCIDIOL SERPL-MCNC: 10.6 NG/ML — LOW (ref 30–80)
ANION GAP SERPL CALC-SCNC: 7 MMOL/L — SIGNIFICANT CHANGE UP (ref 5–17)
BUN SERPL-MCNC: 12 MG/DL — SIGNIFICANT CHANGE UP (ref 7–18)
CALCIUM SERPL-MCNC: 8.6 MG/DL — SIGNIFICANT CHANGE UP (ref 8.4–10.5)
CHLORIDE SERPL-SCNC: 108 MMOL/L — SIGNIFICANT CHANGE UP (ref 96–108)
CHOLEST SERPL-MCNC: 202 MG/DL — HIGH (ref 10–199)
CO2 SERPL-SCNC: 25 MMOL/L — SIGNIFICANT CHANGE UP (ref 22–31)
CREAT SERPL-MCNC: 0.75 MG/DL — SIGNIFICANT CHANGE UP (ref 0.5–1.3)
GLUCOSE SERPL-MCNC: 89 MG/DL — SIGNIFICANT CHANGE UP (ref 70–99)
HBA1C BLD-MCNC: 5.6 % — SIGNIFICANT CHANGE UP (ref 4–5.6)
HCT VFR BLD CALC: 44 % — SIGNIFICANT CHANGE UP (ref 34.5–45)
HDLC SERPL-MCNC: 41 MG/DL — LOW
HGB BLD-MCNC: 14.1 G/DL — SIGNIFICANT CHANGE UP (ref 11.5–15.5)
LIPID PNL WITH DIRECT LDL SERPL: 135 MG/DL — SIGNIFICANT CHANGE UP
MAGNESIUM SERPL-MCNC: 2.4 MG/DL — SIGNIFICANT CHANGE UP (ref 1.6–2.6)
MCHC RBC-ENTMCNC: 29 PG — SIGNIFICANT CHANGE UP (ref 27–34)
MCHC RBC-ENTMCNC: 32 GM/DL — SIGNIFICANT CHANGE UP (ref 32–36)
MCV RBC AUTO: 90.5 FL — SIGNIFICANT CHANGE UP (ref 80–100)
NRBC # BLD: 0 /100 WBCS — SIGNIFICANT CHANGE UP (ref 0–0)
PHOSPHATE SERPL-MCNC: 3.2 MG/DL — SIGNIFICANT CHANGE UP (ref 2.5–4.5)
PLATELET # BLD AUTO: 377 K/UL — SIGNIFICANT CHANGE UP (ref 150–400)
POTASSIUM SERPL-MCNC: 3.6 MMOL/L — SIGNIFICANT CHANGE UP (ref 3.5–5.3)
POTASSIUM SERPL-SCNC: 3.6 MMOL/L — SIGNIFICANT CHANGE UP (ref 3.5–5.3)
RBC # BLD: 4.86 M/UL — SIGNIFICANT CHANGE UP (ref 3.8–5.2)
RBC # FLD: 13.1 % — SIGNIFICANT CHANGE UP (ref 10.3–14.5)
SODIUM SERPL-SCNC: 140 MMOL/L — SIGNIFICANT CHANGE UP (ref 135–145)
TOTAL CHOLESTEROL/HDL RATIO MEASUREMENT: 4.9 RATIO — SIGNIFICANT CHANGE UP (ref 3.3–7.1)
TRIGL SERPL-MCNC: 130 MG/DL — SIGNIFICANT CHANGE UP (ref 10–149)
TSH SERPL-MCNC: 1.34 UU/ML — SIGNIFICANT CHANGE UP (ref 0.34–4.82)
VIT B12 SERPL-MCNC: 1409 PG/ML — HIGH (ref 232–1245)
VIT D25+D1,25 OH+D1,25 PNL SERPL-MCNC: 73.1 PG/ML — SIGNIFICANT CHANGE UP (ref 19.9–79.3)
WBC # BLD: 7.83 K/UL — SIGNIFICANT CHANGE UP (ref 3.8–10.5)
WBC # FLD AUTO: 7.83 K/UL — SIGNIFICANT CHANGE UP (ref 3.8–10.5)

## 2019-06-10 PROCEDURE — 99232 SBSQ HOSP IP/OBS MODERATE 35: CPT

## 2019-06-10 PROCEDURE — 70551 MRI BRAIN STEM W/O DYE: CPT | Mod: 26

## 2019-06-10 PROCEDURE — 99222 1ST HOSP IP/OBS MODERATE 55: CPT

## 2019-06-10 RX ADMIN — Medication 81 MILLIGRAM(S): at 12:04

## 2019-06-10 RX ADMIN — ATORVASTATIN CALCIUM 40 MILLIGRAM(S): 80 TABLET, FILM COATED ORAL at 21:54

## 2019-06-10 RX ADMIN — HEPARIN SODIUM 5000 UNIT(S): 5000 INJECTION INTRAVENOUS; SUBCUTANEOUS at 21:54

## 2019-06-10 RX ADMIN — HEPARIN SODIUM 5000 UNIT(S): 5000 INJECTION INTRAVENOUS; SUBCUTANEOUS at 06:32

## 2019-06-10 RX ADMIN — HEPARIN SODIUM 5000 UNIT(S): 5000 INJECTION INTRAVENOUS; SUBCUTANEOUS at 12:00

## 2019-06-10 RX ADMIN — Medication 0.5 MILLIGRAM(S): at 15:17

## 2019-06-10 NOTE — CONSULT NOTE ADULT - SUBJECTIVE AND OBJECTIVE BOX
+++++++++++++++++++++++++++NOTE NOT COMPLETED++++++++++++++++++++++++++++++++++++  Patient is a 33y old  Female who presents with a chief complaint of Inability to speak (2019 17:40)      HPI:  33 F pt with no significant PMHx brought to ED by  for nonverbal status, flat mood, drowsiness for past 3 days. History primarily obtained through . Inability to speak, mood changes, and drowsiness are abrupt changes from her baseline. Pt never had these set of symptoms. Pt is aware that she is not able to verbalize but denies having weakness, sensory changes, balancing problems, vision/hearing changes, except for BL hand numbness pt had last Thursday, which has resolved since then.  endorses pt acutely started having difficulty with short-term memory for the same duration. Denies personal or family history of psychiatric illnesses, CVA, or seizure. Denies depression, hallucinations, delusions, or history of taking neuroleptic or antipsychotic medications. Denies fever, chills, night sweats, recent travel history, sick contacts, abdominal pain, or urinary/bowel movement habit changes.    In ED, vitals are stable. EKG NSR. CT head showed BL focal hypodensities in thalami. MR angio head /wo contrast nonsignificant. UDS negative. Serum alcohol level nonsignificant. (2019 17:40)         Neurological Review of Systems:  No difficulty with language.  No vision loss or double vision.  No dizziness, vertigo or new hearing loss.  No difficulty with speech or swallowing.  No focal weakness.  No focal sensory changes.  No numbness or tingling in the bilateral lower extremities.  No difficulty with balance.  No difficulty with ambulation.        MEDICATIONS  (STANDING):  aspirin  chewable 81 milliGRAM(s) Oral daily  atorvastatin 40 milliGRAM(s) Oral at bedtime  heparin  Injectable 5000 Unit(s) SubCutaneous every 8 hours    MEDICATIONS  (PRN):    Allergies    penicillin (Rash)    Intolerances      PAST MEDICAL & SURGICAL HISTORY:  No pertinent past medical history   delivery delivered    FAMILY HISTORY:    SOCIAL HISTORY: non smoker    Review of Systems:  Constitutional: No generalized weakness. No fevers or chills.                    Eyes, Ears, Mouth, Throat: No vision loss   Respiratory: No shortness of breath or cough.                                Cardiovascular: No chest pain or palpitations  Gastrointestinal: No nausea or vomiting.                                         Genitourinary: No urinary incontinence or burning on urination.  Musculoskeletal: No joint pain.                                                           Dermatologic: No rash.  Neurological: as per HPI                                                                      Psychiatric: No behavioral problems.  Endocrine: No known hypoglycemia.               Hematologic/Lymphatic: No easy bleeding.    O:  Vital Signs Last 24 Hrs  T(C): 36.4 (10 Jhon 2019 08:50), Max: 37.1 (2019 23:59)  T(F): 97.5 (10 John 2019 08:50), Max: 98.7 (2019 23:59)  HR: 62 (10 John 2019 08:50) (62 - 90)  BP: 92/51 (10 John 2019 08:50) (92/51 - 122/79)  RR: 18 (10 John 2019 08:50) (18 - 20)  SpO2: 100% (10 John 2019 08:50) (98% - 100%)    General Exam:   General appearance: No acute distress                 Cardiovascular: Pedal dorsalis pulses intact bilaterally    Mental Status: Orientated to self, date and place.  Attention intact.  No dysarthria, aphasia or neglect.  Knowledge intact.  Registration intact.  Short and long term memory grossly intact.      Cranial Nerves: CN I - not tested.  PERRL, EOMI, VFF, no nystagmus or diplopia.  No APD.  Fundi not visualized.  CN V1-3 intact to light touch and pinprick.  No facial asymmetry.  Hearing intact to finger rub bilaterally.  Tongue, uvula and palate midline.  Sternocleidomastoid and Trapezius intact bilaterally.    Motor:   Tone: normal.                  Strength intact throughout  No pronator drift bilaterally                      No dysmetria on finger-nose-finger or heel-shin-heel  No truncal ataxia.  No resting, postural or action tremor.  No myoclonus.    Sensation: intact to light touch, pinprick, vibration and proprioception    Deep Tendon Reflexes: 1+ bilateral biceps, triceps, brachioradialis, knee and ankle  Toes flexor bilaterally    Gait: normal and stable.  Rhomberg -casandra.    Other:     LABS:                        14.1   7.83  )-----------( 377      ( 10 John 2019 06:11 )             44.0     06-10    140  |  108  |  12  ----------------------------<  89  3.6   |  25  |  0.75    Ca    8.6      10 John 2019 06:11  Phos  3.2     06-10  Mg     2.4     -10    TPro  8.2  /  Alb  3.8  /  TBili  0.7  /  DBili  x   /  AST  8<L>  /  ALT  20  /  AlkPhos  82        Urinalysis Basic - ( 2019 14:48 )    Color: Yellow / Appearance: Clear / S.025 / pH: x  Gluc: x / Ketone: Negative  / Bili: Negative / Urobili: 1   Blood: x / Protein: 15 / Nitrite: Negative   Leuk Esterase: Negative / RBC: 5-10 /HPF / WBC 0-2 /HPF   Sq Epi: x / Non Sq Epi: Occasional /HPF / Bacteria: Few /HPF          RADIOLOGY & ADDITIONAL STUDIES:  < from: CT Head No Cont (19 @ 14:15) >  EXAM:  CT BRAIN                            *** ADDENDUM 2019  ***    There is mild subtle increased attenuation of the straight sinus, which   is nonspecific. Other considerations include carbon monoxide poisoning.   In addition, there is chronic mucosal thickening of right maxillary   sinus. I discussed above findings with Dr. Urban on 2019 2:47 PM.   Hospital call back policy was followed.  The verbal communication call   back supplements this report.      *** END OF ADDENDUM 2019  ***      PROCEDURE DATE:  2019          INTERPRETATION:      CLINICAL INFORMATION:  Flat affect, altered mental status to days.    TECHNIQUE: Contiguous non contrast axial images of brain from skull base   to vertex sagittal and coronal reformations.   This scan was performed   using automatic exposure control (radiation dose reduction software) to   obtain a diagnostic image quality scan with patient dose as low as   reasonably achievable.      COMPARISON: No previous examinations are available for review.     FINDINGS:       There are focal hypodensities involving bilateral thalami may be related   to underlying ischemic infarct, toxic or metabolic processes, ischemic   etiologies may include consider top of basilar artery of Percheron   infarct, or venous occlusion, or related to substance abuse, less likely   metabolic Theo's disease or less likely CJD,  or neoplasm. Given the   acuity of patient's symptoms ischemic etiologies are favored. There is no   hemorrhage, contusion or extraaxial collection. There is no acute   hydrocephalus. The visualized posterior fossa structures are otherwise   intact. There are scattered intracranial arterial calcification.  The   visualized paranasal sinuses are clear.    IMPRESSION:       Bilateral focal hypodensities involving the thalami given recent onset of   symptoms underlying vascular/ischemic process is favored. Follow-up MRI,   MRA and MRV or CT angiography is suggested for further evaluation      ***Please see the addendum at the top of this report. It may contain   additional important information or changes.****          BING MAYA M.D., ATTENDING RADIOLOGIST  This document has been electronically signed. 2019  2:28PM  Addend:  BING MAYA M.D., ATTENDING RADIOLOGIST  This addendum was electronically signed on: 2019  2:52PM.    < end of copied text > +++++++++++++++++++++++++++NOTE NOT COMPLETED++++++++++++++++++++++++++++++++++++  Patient is a 33y old  Female who presents with a chief complaint of Inability to speak (2019 17:40)      HPI:  33 F pt with no significant PMHx brought to ED by  for nonverbal status, flat mood, drowsiness for past 3 days. History primarily obtained through . Inability to speak, mood changes, and drowsiness are abrupt changes from her baseline. Pt never had these set of symptoms. Pt is aware that she is not able to verbalize but denies having weakness, sensory changes, balancing problems, vision/hearing changes, except for BL hand numbness pt had last Thursday, which has resolved since then.  endorses pt acutely started having difficulty with short-term memory for the same duration. Denies personal or family history of psychiatric illnesses, CVA, or seizure. Denies depression, hallucinations, delusions, or history of taking neuroleptic or antipsychotic medications. Denies fever, chills, night sweats, recent travel history, sick contacts, abdominal pain, or urinary/bowel movement habit changes.  Pt states that she was going home yesterday when she felt sick described as feeling pain in her elbow joints.      In ED, vitals are stable. EKG NSR. CT head showed BL focal hypodensities in thalami. MR angio head /wo contrast nonsignificant. UDS negative. Serum alcohol level nonsignificant. (2019 17:40)         Neurological Review of Systems:  No difficulty with language.  No vision loss or double vision.  No dizziness, vertigo or new hearing loss.  No difficulty with speech or swallowing.  No focal weakness.  No focal sensory changes.  No numbness or tingling in the bilateral lower extremities.  No difficulty with balance.  No difficulty with ambulation.        MEDICATIONS  (STANDING):  aspirin  chewable 81 milliGRAM(s) Oral daily  atorvastatin 40 milliGRAM(s) Oral at bedtime  heparin  Injectable 5000 Unit(s) SubCutaneous every 8 hours    MEDICATIONS  (PRN):    Allergies    penicillin (Rash)    Intolerances      PAST MEDICAL & SURGICAL HISTORY:  No pertinent past medical history   delivery delivered    FAMILY HISTORY:    SOCIAL HISTORY: non smoker    Review of Systems:  Constitutional: No generalized weakness. No fevers or chills.                    Eyes, Ears, Mouth, Throat: No vision loss   Respiratory: No shortness of breath or cough.                                Cardiovascular: No chest pain or palpitations  Gastrointestinal: No nausea or vomiting.                                         Genitourinary: No urinary incontinence or burning on urination.  Musculoskeletal: No joint pain.                                                           Dermatologic: No rash.  Neurological: as per HPI                                                                      Psychiatric: No behavioral problems.  Endocrine: No known hypoglycemia.               Hematologic/Lymphatic: No easy bleeding.    O:  Vital Signs Last 24 Hrs  T(C): 36.4 (10 John 2019 08:50), Max: 37.1 (2019 23:59)  T(F): 97.5 (10 John 2019 08:50), Max: 98.7 (2019 23:59)  HR: 62 (10 John 2019 08:50) (62 - 90)  BP: 92/51 (10 John 2019 08:50) (92/51 - 122/79)  RR: 18 (10 John 2019 08:50) (18 - 20)  SpO2: 100% (10 John 2019 08:50) (98% - 100%)    General Exam:   General appearance: No acute distress                 Cardiovascular: Pedal dorsalis pulses intact bilaterally    Mental Status: Orientated to self, and place, not date.  Lethargic.  No dysarthria, aphasia or neglect.  Knowledge intact.  Registration intact.  Short and long term memory grossly intact.      Cranial Nerves: CN I - not tested.  PERRL, EOMI, VFF, no nystagmus or diplopia.  No APD.  Fundi not visualized.  CN V1-3 intact to light touch and pinprick.  Right eye closure while left eye was blinking.  Hearing intact to finger rub bilaterally.  Tongue, uvula and palate midline.  Sternocleidomastoid and Trapezius intact bilaterally.    Motor:   Tone: Normal and normal bulk                  Strength intact 3/5 throughout  No pronator drift bilaterally                      No dysmetria on finger-nose-finger or heel-shin-heel  No truncal ataxia.  No resting, postural or action tremor.  No myoclonus.    Sensation: intact to light touch, pinprick, vibration and proprioception    Deep Tendon Reflexes: 1+ bilateral biceps, triceps, brachioradialis, knee and ankle  Toes flexor bilaterally    Gait: normal and stable.  Rhomberg -casandra.    Other:     LABS:                        14.1   7.83  )-----------( 377      ( 10 John 2019 06:11 )             44.0     06-10    140  |  108  |  12  ----------------------------<  89  3.6   |  25  |  0.75    Ca    8.6      10 John 2019 06:11  Phos  3.2     06-10  Mg     2.4     06-10    TPro  8.2  /  Alb  3.8  /  TBili  0.7  /  DBili  x   /  AST  8<L>  /  ALT  20  /  AlkPhos  82        Urinalysis Basic - ( 2019 14:48 )    Color: Yellow / Appearance: Clear / S.025 / pH: x  Gluc: x / Ketone: Negative  / Bili: Negative / Urobili: 1   Blood: x / Protein: 15 / Nitrite: Negative   Leuk Esterase: Negative / RBC: 5-10 /HPF / WBC 0-2 /HPF   Sq Epi: x / Non Sq Epi: Occasional /HPF / Bacteria: Few /HPF          RADIOLOGY & ADDITIONAL STUDIES:  < from: CT Head No Cont (19 @ 14:15) >  EXAM:  CT BRAIN                            *** ADDENDUM 2019  ***    There is mild subtle increased attenuation of the straight sinus, which   is nonspecific. Other considerations include carbon monoxide poisoning.   In addition, there is chronic mucosal thickening of right maxillary   sinus. I discussed above findings with Dr. Urban on 2019 2:47 PM.   Hospital call back policy was followed.  The verbal communication call   back supplements this report.      *** END OF ADDENDUM 2019  ***      PROCEDURE DATE:  2019          INTERPRETATION:      CLINICAL INFORMATION:  Flat affect, altered mental status to days.    TECHNIQUE: Contiguous non contrast axial images of brain from skull base   to vertex sagittal and coronal reformations.   This scan was performed   using automatic exposure control (radiation dose reduction software) to   obtain a diagnostic image quality scan with patient dose as low as   reasonably achievable.      COMPARISON: No previous examinations are available for review.     FINDINGS:       There are focal hypodensities involving bilateral thalami may be related   to underlying ischemic infarct, toxic or metabolic processes, ischemic   etiologies may include consider top of basilar artery of P[place]heron   infarct, or venous occlusion, or related to substance abuse, less likely   metabolic Theo's disease or less likely CJD,  or neoplasm. Given the   acuity of patient's symptoms ischemic etiologies are favored. There is no   hemorrhage, contusion or extraaxial collection. There is no acute   hydrocephalus. The visualized posterior fossa structures are otherwise   intact. There are scattered intracranial arterial calcification.  The   visualized paranasal sinuses are clear.    IMPRESSION:       Bilateral focal hypodensities involving the thalami given recent onset of   symptoms underlying vascular/ischemic process is favored. Follow-up MRI,   MRA and MRV or CT angiography is suggested for further evaluation      ***Please see the addendum at the top of this report. It may contain   additional important information or changes.****          BING MAYA M.D., ATTENDING RADIOLOGIST  This document has been electronically signed. 2019  2:28PM  Addend:  BING MAYA M.D., ATTENDING RADIOLOGIST  This addendum was electronically signed on: 2019  2:52PM.    < end of copied text > +++++++++++++++++++++++++++NOTE NOT COMPLETED++++++++++++++++++++++++++++++++++++  Patient is a 33y old  Female who presents with a chief complaint of Inability to speak (2019 17:40)      HPI:  33 F pt with no significant PMHx brought to ED by  for nonverbal status, flat mood, drowsiness for past 3 days. History primarily obtained through . Inability to speak, mood changes, and drowsiness are abrupt changes from her baseline. Pt never had these set of symptoms. Pt is aware that she is not able to verbalize but denies having weakness, sensory changes, balancing problems, vision/hearing changes, except for BL hand numbness pt had last Thursday, which has resolved since then.  endorses pt acutely started having difficulty with short-term memory for the same duration. Denies personal or family history of psychiatric illnesses, CVA, or seizure. Denies depression, hallucinations, delusions, or history of taking neuroleptic or antipsychotic medications. Denies fever, chills, night sweats, recent travel history, sick contacts, abdominal pain, or urinary/bowel movement habit changes.  Pt states that she was going home yesterday when she felt sick, described as feeling pain in her elbow joints.      In ED, vitals are stable. EKG NSR. CT head showed BL focal hypodensities in thalami. MR angio head /wo contrast nonsignificant. UDS negative. Serum alcohol level nonsignificant. (2019 17:40)         Neurological Review of Systems:  No difficulty with language.  No vision loss or double vision.  No dizziness, vertigo or new hearing loss.  No difficulty with speech or swallowing.  No focal weakness.  No focal sensory changes.  No numbness or tingling in the bilateral lower extremities.  No difficulty with balance.  No difficulty with ambulation.        MEDICATIONS  (STANDING):  aspirin  chewable 81 milliGRAM(s) Oral daily  atorvastatin 40 milliGRAM(s) Oral at bedtime  heparin  Injectable 5000 Unit(s) SubCutaneous every 8 hours    MEDICATIONS  (PRN):    Allergies    penicillin (Rash)    Intolerances      PAST MEDICAL & SURGICAL HISTORY:  No pertinent past medical history   delivery delivered    FAMILY HISTORY:    SOCIAL HISTORY: non smoker    Review of Systems:  Constitutional: No generalized weakness. No fevers or chills.                    Eyes, Ears, Mouth, Throat: No vision loss   Respiratory: No shortness of breath or cough.                                Cardiovascular: No chest pain or palpitations  Gastrointestinal: No nausea or vomiting.                                         Genitourinary: No urinary incontinence or burning on urination.  Musculoskeletal: No joint pain.                                                           Dermatologic: No rash.  Neurological: as per HPI                                                                      Psychiatric: No behavioral problems.  Endocrine: No known hypoglycemia.               Hematologic/Lymphatic: No easy bleeding.    O:  Vital Signs Last 24 Hrs  T(C): 36.4 (10 John 2019 08:50), Max: 37.1 (2019 23:59)  T(F): 97.5 (10 John 2019 08:50), Max: 98.7 (2019 23:59)  HR: 62 (10 John 2019 08:50) (62 - 90)  BP: 92/51 (10 John 2019 08:50) (92/51 - 122/79)  RR: 18 (10 John 2019 08:50) (18 - 20)  SpO2: 100% (10 John 2019 08:50) (98% - 100%)    General Exam:   General appearance: No acute distress                 Cardiovascular: Pedal dorsalis pulses intact bilaterally    Mental Status: Orientated to self, and place, not date.  Lethargic.  No dysarthria, aphasia or neglect.  Knowledge intact.  Registration intact.  Short and long term memory grossly intact.      Cranial Nerves: CN I - not tested.  PERRL, EOMI, VFF, no nystagmus or diplopia.  No APD.  Fundi not visualized.  CN V1-3 intact to light touch and pinprick.  Right eye closure while left eye was blinking.  Hearing intact to finger rub bilaterally.  Tongue, uvula and palate midline.  Sternocleidomastoid and Trapezius intact bilaterally.    Motor:   Tone: Normal and normal bulk                  Strength intact 3/5 throughout  No pronator drift bilaterally                      No dysmetria on finger-nose-finger or heel-shin-heel  No truncal ataxia.  No resting, postural or action tremor.  No myoclonus.    Sensation: intact to light touch, pinprick, vibration and proprioception    Deep Tendon Reflexes: Mute bilateral biceps, triceps, brachioradialis, knee and ankle  Toes flexor bilaterally    Gait: normal and stable.  Rhomberg -casandra.    Other:     LABS:                        14.1   7.83  )-----------( 377      ( 10 John 2019 06:11 )             44.0     06-10    140  |  108  |  12  ----------------------------<  89  3.6   |  25  |  0.75    Ca    8.6      10 John 2019 06:11  Phos  3.2     06-10  Mg     2.4     06-10    TPro  8.2  /  Alb  3.8  /  TBili  0.7  /  DBili  x   /  AST  8<L>  /  ALT  20  /  AlkPhos  82  -      Urinalysis Basic - ( 2019 14:48 )    Color: Yellow / Appearance: Clear / S.025 / pH: x  Gluc: x / Ketone: Negative  / Bili: Negative / Urobili: 1   Blood: x / Protein: 15 / Nitrite: Negative   Leuk Esterase: Negative / RBC: 5-10 /HPF / WBC 0-2 /HPF   Sq Epi: x / Non Sq Epi: Occasional /HPF / Bacteria: Few /HPF          RADIOLOGY & ADDITIONAL STUDIES:  < from: CT Head No Cont (19 @ 14:15) >  EXAM:  CT BRAIN                            *** ADDENDUM 2019  ***    There is mild subtle increased attenuation of the straight sinus, which   is nonspecific. Other considerations include carbon monoxide poisoning.   In addition, there is chronic mucosal thickening of right maxillary   sinus. I discussed above findings with Dr. Urban on 2019 2:47 PM.   Hospital call back policy was followed.  The verbal communication call   back supplements this report.      *** END OF ADDENDUM 2019  ***      PROCEDURE DATE:  2019          INTERPRETATION:      CLINICAL INFORMATION:  Flat affect, altered mental status to days.    TECHNIQUE: Contiguous non contrast axial images of brain from skull base   to vertex sagittal and coronal reformations.   This scan was performed   using automatic exposure control (radiation dose reduction software) to   obtain a diagnostic image quality scan with patient dose as low as   reasonably achievable.      COMPARISON: No previous examinations are available for review.     FINDINGS:       There are focal hypodensities involving bilateral thalami may be related   to underlying ischemic infarct, toxic or metabolic processes, ischemic   etiologies may include consider top of basilar artery of Percheron   infarct, or venous occlusion, or related to substance abuse, less likely   metabolic Theo's disease or less likely CJD,  or neoplasm. Given the   acuity of patient's symptoms ischemic etiologies are favored. There is no   hemorrhage, contusion or extraaxial collection. There is no acute   hydrocephalus. The visualized posterior fossa structures are otherwise   intact. There are scattered intracranial arterial calcification.  The   visualized paranasal sinuses are clear.    IMPRESSION:       Bilateral focal hypodensities involving the thalami given recent onset of   symptoms underlying vascular/ischemic process is favored. Follow-up MRI,   MRA and MRV or CT angiography is suggested for further evaluation      ***Please see the addendum at the top of this report. It may contain   additional important information or changes.****          BING MAYA M.D., ATTENDING RADIOLOGIST  This document has been electronically signed. 2019  2:28PM  Addend:  BING MAYA M.D., ATTENDING RADIOLOGIST  This addendum was electronically signed on: 2019  2:52PM.    < end of copied text > Patient is a 33y old  Female who presents with a chief complaint of Inability to speak (2019 17:40)      HPI:  33 F pt with no significant PMHx brought to ED by  for nonverbal status, flat mood, drowsiness for past 3 days. History primarily obtained through . Inability to speak, mood changes, and drowsiness are abrupt changes from her baseline. Pt never had these set of symptoms. Pt is aware that she is not able to verbalize but denies having weakness, sensory changes, balancing problems, vision/hearing changes, except for BL hand numbness pt had last Thursday, which has resolved since then.  endorses pt acutely started having difficulty with short-term memory for the same duration. Denies personal or family history of psychiatric illnesses, CVA, or seizure. Denies depression, hallucinations, delusions, or history of taking neuroleptic or antipsychotic medications. Denies fever, chills, night sweats, recent travel history, sick contacts, abdominal pain, or urinary/bowel movement habit changes.  Pt states that she was going home yesterday when she felt sick, described as feeling pain in her elbow joints.      In ED, vitals are stable. EKG NSR. CT head showed BL focal hypodensities in thalami. MR angio head /wo contrast nonsignificant. UDS negative. Serum alcohol level nonsignificant. (2019 17:40)         Neurological Review of Systems:  No difficulty with language.  No vision loss or double vision.  No dizziness, vertigo or new hearing loss.  No difficulty with speech or swallowing.  No focal weakness.  No focal sensory changes.  No numbness or tingling in the bilateral lower extremities.  No difficulty with balance.  No difficulty with ambulation.        MEDICATIONS  (STANDING):  aspirin  chewable 81 milliGRAM(s) Oral daily  atorvastatin 40 milliGRAM(s) Oral at bedtime  heparin  Injectable 5000 Unit(s) SubCutaneous every 8 hours    MEDICATIONS  (PRN):    Allergies    penicillin (Rash)    Intolerances      PAST MEDICAL & SURGICAL HISTORY:  No pertinent past medical history   delivery delivered    FAMILY HISTORY:    SOCIAL HISTORY: non smoker    Review of Systems:  Constitutional: No generalized weakness. No fevers or chills.                    Eyes, Ears, Mouth, Throat: No vision loss   Respiratory: No shortness of breath or cough.                                Cardiovascular: No chest pain or palpitations  Gastrointestinal: No nausea or vomiting.                                         Genitourinary: No urinary incontinence or burning on urination.  Musculoskeletal: No joint pain.                                                           Dermatologic: No rash.  Neurological: as per HPI                                                                      Psychiatric: No behavioral problems.  Endocrine: No known hypoglycemia.               Hematologic/Lymphatic: No easy bleeding.    O:  Vital Signs Last 24 Hrs  T(C): 36.4 (10 John 2019 08:50), Max: 37.1 (2019 23:59)  T(F): 97.5 (10 John 2019 08:50), Max: 98.7 (2019 23:59)  HR: 62 (10 John 2019 08:50) (62 - 90)  BP: 92/51 (10 John 2019 08:50) (92/51 - 122/79)  RR: 18 (10 John 2019 08:50) (18 - 20)  SpO2: 100% (10 John 2019 08:50) (98% - 100%)    General Exam:   General appearance: No acute distress                 Cardiovascular: Pedal dorsalis pulses intact bilaterally    Mental Status: Orientated to self, and place, not date.  Lethargic.  No dysarthria, aphasia or neglect.  Knowledge intact.  Registration intact.  Short and long term memory grossly intact.      Cranial Nerves: CN I - not tested.  PERRL, EOMI, VFF, no nystagmus or diplopia.  No APD.  Fundi not visualized.  CN V1-3 intact to light touch and pinprick.  Right eye closure while left eye was blinking.  Hearing intact to finger rub bilaterally.  Tongue, uvula and palate midline.  Sternocleidomastoid and Trapezius intact bilaterally.    Motor:   Tone: Normal and normal bulk                  Strength intact 3/5 throughout  No pronator drift bilaterally                      No dysmetria on finger-nose-finger or heel-shin-heel  No truncal ataxia.  No resting, postural or action tremor.  No myoclonus.    Sensation: intact to light touch, pinprick, vibration and proprioception    Deep Tendon Reflexes: Mute bilateral biceps, triceps, brachioradialis, knee and ankle  Toes flexor bilaterally    Gait: normal and stable.  Rhomberg -casandra.    Other:     LABS:                        14.1   7.83  )-----------( 377      ( 10 John 2019 06:11 )             44.0     06-10    140  |  108  |  12  ----------------------------<  89  3.6   |  25  |  0.75    Ca    8.6      10 John 2019 06:11  Phos  3.2     06-10  Mg     2.4     06-10    TPro  8.2  /  Alb  3.8  /  TBili  0.7  /  DBili  x   /  AST  8<L>  /  ALT  20  /  AlkPhos  82        Urinalysis Basic - ( 2019 14:48 )    Color: Yellow / Appearance: Clear / S.025 / pH: x  Gluc: x / Ketone: Negative  / Bili: Negative / Urobili: 1   Blood: x / Protein: 15 / Nitrite: Negative   Leuk Esterase: Negative / RBC: 5-10 /HPF / WBC 0-2 /HPF   Sq Epi: x / Non Sq Epi: Occasional /HPF / Bacteria: Few /HPF          RADIOLOGY & ADDITIONAL STUDIES:  < from: CT Head No Cont (19 @ 14:15) >  EXAM:  CT BRAIN                            *** ADDENDUM 2019  ***    There is mild subtle increased attenuation of the straight sinus, which   is nonspecific. Other considerations include carbon monoxide poisoning.   In addition, there is chronic mucosal thickening of right maxillary   sinus. I discussed above findings with Dr. Urban on 2019 2:47 PM.   Hospital call back policy was followed.  The verbal communication call   back supplements this report.      *** END OF ADDENDUM 2019  ***      PROCEDURE DATE:  2019          INTERPRETATION:      CLINICAL INFORMATION:  Flat affect, altered mental status to days.    TECHNIQUE: Contiguous non contrast axial images of brain from skull base   to vertex sagittal and coronal reformations.   This scan was performed   using automatic exposure control (radiation dose reduction software) to   obtain a diagnostic image quality scan with patient dose as low as   reasonably achievable.      COMPARISON: No previous examinations are available for review.     FINDINGS:       There are focal hypodensities involving bilateral thalami may be related   to underlying ischemic infarct, toxic or metabolic processes, ischemic   etiologies may include consider top of basilar artery of Percheron   infarct, or venous occlusion, or related to substance abuse, less likely   metabolic Theo's disease or less likely CJD,  or neoplasm. Given the   acuity of patient's symptoms ischemic etiologies are favored. There is no   hemorrhage, contusion or extraaxial collection. There is no acute   hydrocephalus. The visualized posterior fossa structures are otherwise   intact. There are scattered intracranial arterial calcification.  The   visualized paranasal sinuses are clear.    IMPRESSION:       Bilateral focal hypodensities involving the thalami given recent onset of   symptoms underlying vascular/ischemic process is favored. Follow-up MRI,   MRA and MRV or CT angiography is suggested for further evaluation      ***Please see the addendum at the top of this report. It may contain   additional important information or changes.****          BING MAYA M.D., ATTENDING RADIOLOGIST  This document has been electronically signed. 2019  2:28PM  Addend:  BING MAYA M.D., ATTENDING RADIOLOGIST  This addendum was electronically signed on: 2019  2:52PM.    < end of copied text >

## 2019-06-10 NOTE — SWALLOW BEDSIDE ASSESSMENT ADULT - ASR SWALLOW ASPIRATION MONITOR
position upright (90Y)/cough/fever/gurgly voice/upper respiratory infection/pneumonia/throat clearing/oral hygiene/change of breathing pattern

## 2019-06-10 NOTE — PROGRESS NOTE ADULT - PROBLEM SELECTOR PLAN 3
Prophylactic measure.  Plan: RISK                                                           Points  [] Previous VTE                                           3  [] Thrombophilia                                        2  [] Lower limb paralysis                              2   [] Current Cancer                                       2   [x] Immobilization > 24 hrs                        1  [] ICU/CCU stay > 24 hours                       1  [x] Age > 60                                                   1    IMPROVE VTE Score: 2    Heparin for chemical DVT prophylaxis

## 2019-06-10 NOTE — SWALLOW BEDSIDE ASSESSMENT ADULT - COMMENTS
Pt AA&Ox1. HOB elevated to 45 degrees. Pt followed basic directions w/minimal cues & responded to basic questions w/head nods/shakes appropriately. Pt said "ah" and "one, two, three" on request. Pt did not respond to simple open ended questions.

## 2019-06-10 NOTE — SWALLOW BEDSIDE ASSESSMENT ADULT - SWALLOW EVAL: DIAGNOSIS
Pt p/w adequate oral & pharygneal phases of chew & swallow for tolerating regular diet: Good labial seal, Functional bolus manipulation & propulsion, Unremarkable oral phase; Timely and efficient oropharyngeal swallow with no overt s/s of laryngeal penetration or aspiration at this exam.

## 2019-06-10 NOTE — PROGRESS NOTE ADULT - PROBLEM SELECTOR PLAN 2
- f/u MRI with IV contrast   - neurology follow up- dr. Cao to see today  - c/w 1:1 for safety - f/u MRI with IV contrast   - lorazepam on call for MRI  - left message for  Abena Albarado [phone]  - neurology follow up- dr. Cao to see today  - c/w 1:1 for safety

## 2019-06-10 NOTE — CONSULT NOTE ADULT - ASSESSMENT
++++++++++++++++++++++++++++NOTE NOT COMPLETED+++++++++++++++++++++++++++++++++ 32yo female w/ transient Aphasia     DD  Migraine       Recommendations:    -MR Head to evaluate for abnormality       -MRA Head no contrast to evaluate for stenosis 32yo female w/ transient Aphasia     DD  Migraine   Medication SE  Psychoactive drug      Recommendations:    -MR Head to evaluate for abnormality       -MRA Head no contrast to evaluate for stenosis 34yo female w/ transient Aphasia     DD  While Migraine, Medication SE and psychoactive drugs are likely based on history and recovery, the CT appearance is suggestive of another pathology. Bilateral thalamic infarcts or cysticercosis are considerations. W        Recommendations:    -MR Head to evaluate for abnormality

## 2019-06-10 NOTE — PROGRESS NOTE BEHAVIORAL HEALTH - NSBHCHARTREVIEWIMAGING_PSY_A_CORE FT
MRI: There are focal hypodensities involving bilateral thalami may be related   to underlying ischemic infarct, toxic or metabolic processes, ischemic   etiologies may include consider top of basilar artery of Percheron   infarct, or venous occlusion, or related to substance abuse, less likely   metabolic Theo's disease or less likely CJD,  or neoplasm. Given the   acuity of patient's symptoms ischemic etiologies are favored. There is no   hemorrhage, contusion or extraaxial collection. There is no acute   hydrocephalus. The visualized posterior fossa structures are otherwise   intact. There are scattered intracranial arterial calcification.  The   visualized paranasal sinuses are clear.    normal TTE

## 2019-06-10 NOTE — PROGRESS NOTE ADULT - SUBJECTIVE AND OBJECTIVE BOX
NP Note   Patient is a 33y old  Female who presents with a chief complaint of Inability to speak (10 John 2019 10:28)      INTERVAL HPI/OVERNIGHT EVENTS: no new complaints, follows commands with head nod.     MEDICATIONS  (STANDING):  aspirin  chewable 81 milliGRAM(s) Oral daily  atorvastatin 40 milliGRAM(s) Oral at bedtime  heparin  Injectable 5000 Unit(s) SubCutaneous every 8 hours  LORazepam   Injectable 0.5 milliGRAM(s) IV Push once    MEDICATIONS  (PRN):      __________________________________________________  REVIEW OF SYSTEMS:    CONSTITUTIONAL: No fever,   EYES: no acute visual disturbances  NECK: No pain or stiffness  RESPIRATORY: No cough; No shortness of breath  CARDIOVASCULAR: No chest pain, no palpitations  GASTROINTESTINAL: No pain. No nausea or vomiting; No diarrhea   NEUROLOGICAL: No headache or numbness, no tremors  MUSCULOSKELETAL: No joint pain, no muscle pain  GENITOURINARY: no dysuria, no frequency, no hesitancy  PSYCHIATRY: no depression , no anxiety  ALL OTHER  ROS negative        Vital Signs Last 24 Hrs  T(C): 36.8 (10 John 2019 12:14), Max: 37.1 (2019 23:59)  T(F): 98.2 (10 John 2019 12:14), Max: 98.7 (2019 23:59)  HR: 65 (10 John 2019 12:14) (62 - 90)  BP: 111/70 (10 John 2019 12:14) (92/51 - 122/79)  BP(mean): --  RR: 18 (10 John 2019 12:14) (18 - 20)  SpO2: 100% (10 John 2019 12:14) (100% - 100%)    ________________________________________________  PHYSICAL EXAM:  GENERAL: NAD  HEENT: Normocephalic;  conjunctivae and sclerae clear; moist mucous membranes;   NECK : supple  CHEST/LUNG: Clear to auscultation bilaterally with good air entry   HEART: S1 S2  regular; no murmurs, gallops or rubs  ABDOMEN: large, Soft, Nontender, Nondistended; Bowel sounds present  EXTREMITIES: no cyanosis; no edema; no calf tenderness  SKIN: warm and dry; no rash  NERVOUS SYSTEM:  Awake and alert; responds to name with nod, does not speak ; no new focal deficits    _________________________________________________  LABS:                        14.1   7.83  )-----------( 377      ( 10 John 2019 06:11 )             44.0     06-10    140  |  108  |  12  ----------------------------<  89  3.6   |  25  |  0.75    Ca    8.6      10 John 2019 06:11  Phos  3.2     06-10  Mg     2.4     06-10    TPro  8.2  /  Alb  3.8  /  TBili  0.7  /  DBili  x   /  AST  8<L>  /  ALT  20  /  AlkPhos  82  06-09      Urinalysis Basic - ( 2019 14:48 )    Color: Yellow / Appearance: Clear / S.025 / pH: x  Gluc: x / Ketone: Negative  / Bili: Negative / Urobili: 1   Blood: x / Protein: 15 / Nitrite: Negative   Leuk Esterase: Negative / RBC: 5-10 /HPF / WBC 0-2 /HPF   Sq Epi: x / Non Sq Epi: Occasional /HPF / Bacteria: Few /HPF      CAPILLARY BLOOD GLUCOSE      < from: CT Head No Cont (19 @ 14:15) >  IMPRESSION:       Bilateral focal hypodensities involving the thalami given recent onset of   symptoms underlying vascular/ischemic process is favored. Follow-up MRI,   MRA and MRV or CT angiography is suggested for further evaluation      < end of copied text >  < from: CT Head No Cont (19 @ 14:15) >  *** ADDENDUM 2019  ***    There is mild subtle increased attenuation of the straight sinus, which   is nonspecific. Other considerations include carbon monoxide poisoning.   In addition, there is chronic mucosal thickening of right maxillary   sinus. I discussed above findings with Dr. Urban on 2019 2:47 PM.   Hospital call back policy was followed.  The verbal communication call   back supplements this report.      < end of copied text >  < from: MR Angio Head No Cont (19 @ 15:48) >    IMPRESSION:     No vascular occlusion or malformation identified. CTA imaging of the head   is recommended for further assessment. See above.    < end of copied text >        RADIOLOGY & ADDITIONAL TESTS:    Imaging  Reviewed:  YES    Consultant(s) Notes Reviewed:   YES      Plan of care was discussed with patient and call placed to - left message

## 2019-06-10 NOTE — SWALLOW BEDSIDE ASSESSMENT ADULT - SWALLOW EVAL: RECOMMENDED FEEDING/EATING TECHNIQUES
maintain upright posture during/after eating for 30 mins/no straws/alternate food with liquid/allow for swallow between intakes/oral hygiene/small sips/bites/position upright (90 degrees)/check mouth frequently for oral residue/pocketing/crush medication (when feasible)

## 2019-06-10 NOTE — ED ADULT NURSE REASSESSMENT NOTE - NS ED NURSE REASSESS COMMENT FT1
ambulatory, follows command, oriented to name - by writing on paper, disoriented to time and place, able to count by 1-10 by writing but not talking.
pt follows command, but not talking just making sign, compliant with her 10pm medications, moving all extremities, noted able to sit on bed.
Pt comfortable. Not speaking, passed dysphasia screen. 1:1 in effect to maintain pt safety. Will monitor

## 2019-06-10 NOTE — SWALLOW BEDSIDE ASSESSMENT ADULT - SLP PERTINENT HISTORY OF CURRENT PROBLEM
33 F pt with no significant PMHx brought to ED by  for nonverbal status, flat mood, drowsiness for past 3 days. History primarily obtained through . Inability to speak, mood changes, and drowsiness are abrupt changes from her baseline. Pt never had these set of symptoms.

## 2019-06-10 NOTE — PROGRESS NOTE ADULT - PROBLEM SELECTOR PLAN 1
-Patient is not verbalizing, flat mood   -CT head noted as above  -MR angio head /wo contrast nonsignificant.   -Utox negative, blood alcohol level not significant   -No personal or family history of psychiatric illnesses, CVA, or seizure. Denies   -Neuro Dr. Bustamante consulted, recommended to start aspirin and statin  -Psych Dr. Salas consulted  -F/u MR head /w contrast- call placed to  for IV contrast consent- left message  -F/u TTE  -Neuro check Q4hrs  -Monitor on tele

## 2019-06-10 NOTE — PROGRESS NOTE BEHAVIORAL HEALTH - SUMMARY
33  [facility] female with no prior psych history and no significant PMHx brought to ED by  for nonverbal status, flat mood, drowsiness for past 3 days, disorganized thought process and disorientation on exam however denying and not exhibiting any signs of acute psychiatric issues, corresponding to prior psych eval; presentation most likely due to neurological reasons corresponding with imaging results. Will d/x standing antipsychotic 33  [place] female with no prior psych history and no significant PMHx brought to ED by  for nonverbal status, flat mood, drowsiness for past 3 days, disorganized thought process and disorientation on exam however denying and not exhibiting any signs of acute psychiatric issues, corresponding to prior psych eval; presentation most likely due to neurological reasons corresponding with imaging results. Will d/c standing antipsychotic 33  [place] female with no prior psych history and no significant PMHx brought to ED by  for nonverbal status, flat mood, drowsiness for past 3 days, disorganized thought process and disorientation on exam however denying and not exhibiting any signs of acute psychiatric issues, corresponding to prior psych eval; presentation most likely due to neurological reasons corresponding with imaging results.

## 2019-06-10 NOTE — PROGRESS NOTE BEHAVIORAL HEALTH - NSBHFUPINTERVALHXFT_PSY_A_CORE
Pt was asleep at 6pm with family at bedside. When woken up she reported she is in the hospital due to "problems my child and I was having" which on further questioning she details as "just regular food poisoning". Later she says her issue is scrubbing her skin and shows evidence on her wrist, however nothing is noted on her wrist. She later said "I'm not allowed to be in contact with my family" when confronted pt's family was at her bedside she said it was her mom and sister she was not allowed to be in contact with "by the doctor" (?) when asked to clarify she said "my phone is out of system".   Denied sxs of depression/kevin/hallucinations/insomnia/SI/HI   at bedside reported a h/o anger issues and short temper as well as a prior SA in 2010 by ASHLY that resulted in a day of MICU stay. Pt was asleep at 6pm with family at bedside. When woken up she reported she is in the hospital due to "problems my child and I was having" which on further questioning she details as "just regular food poisoning". Later she says her issue is scrubbing her skin and shows evidence on her wrist, however nothing is noted on her wrist. She later said "I'm not allowed to be in contact with my family" when confronted pt's family was at her bedside she said it was her mom and sister she was not allowed to be in contact with "by the doctor" (?) when asked to clarify she said "my phone is out of system".   Denied sxs of depression/kevin/hallucinations/insomnia/SI/HI   at bedside reported a h/o anger issues and short temper as well as a prior SA in 2010 by OD that resulted in a day of MICU stay. He denies recent psych tx or being on meds, changes in pt's mental status prior to this HPI starting 3 days prior to coming to the ER, recent SI/SIB.     Neuro note: While Migraine, Medication SE and psychoactive drugs are likely based on history and recovery, the CT appearance is suggestive of another pathology. Bilateral thalamic infarcts or cysticercosis are considerations."

## 2019-06-10 NOTE — PROGRESS NOTE BEHAVIORAL HEALTH - NSBHCONSULTMEDSEVERE_PSY_A_CORE FT
Zyprexa 2.5mg q8 PO for agitation, Haldol 1mg q6 IV for severe agitation Haldol 1mg q6 PO/IV for severe agitation

## 2019-06-11 ENCOUNTER — TRANSCRIPTION ENCOUNTER (OUTPATIENT)
Age: 34
End: 2019-06-11

## 2019-06-11 DIAGNOSIS — E55.9 VITAMIN D DEFICIENCY, UNSPECIFIED: ICD-10-CM

## 2019-06-11 LAB
ANION GAP SERPL CALC-SCNC: 9 MMOL/L — SIGNIFICANT CHANGE UP (ref 5–17)
BUN SERPL-MCNC: 12 MG/DL — SIGNIFICANT CHANGE UP (ref 7–18)
CALCIUM SERPL-MCNC: 8.9 MG/DL — SIGNIFICANT CHANGE UP (ref 8.4–10.5)
CHLORIDE SERPL-SCNC: 106 MMOL/L — SIGNIFICANT CHANGE UP (ref 96–108)
CO2 SERPL-SCNC: 24 MMOL/L — SIGNIFICANT CHANGE UP (ref 22–31)
CREAT SERPL-MCNC: 0.8 MG/DL — SIGNIFICANT CHANGE UP (ref 0.5–1.3)
GLUCOSE SERPL-MCNC: 88 MG/DL — SIGNIFICANT CHANGE UP (ref 70–99)
POTASSIUM SERPL-MCNC: 3.5 MMOL/L — SIGNIFICANT CHANGE UP (ref 3.5–5.3)
POTASSIUM SERPL-SCNC: 3.5 MMOL/L — SIGNIFICANT CHANGE UP (ref 3.5–5.3)
SODIUM SERPL-SCNC: 139 MMOL/L — SIGNIFICANT CHANGE UP (ref 135–145)

## 2019-06-11 PROCEDURE — G0452: CPT | Mod: 26

## 2019-06-11 PROCEDURE — 99232 SBSQ HOSP IP/OBS MODERATE 35: CPT

## 2019-06-11 RX ORDER — ERGOCALCIFEROL 1.25 MG/1
50000 CAPSULE ORAL
Refills: 0 | Status: DISCONTINUED | OUTPATIENT
Start: 2019-06-11 | End: 2019-06-18

## 2019-06-11 RX ORDER — SODIUM CHLORIDE 9 MG/ML
1000 INJECTION, SOLUTION INTRAVENOUS
Refills: 0 | Status: DISCONTINUED | OUTPATIENT
Start: 2019-06-12 | End: 2019-06-18

## 2019-06-11 RX ADMIN — ERGOCALCIFEROL 50000 UNIT(S): 1.25 CAPSULE ORAL at 12:37

## 2019-06-11 RX ADMIN — ATORVASTATIN CALCIUM 40 MILLIGRAM(S): 80 TABLET, FILM COATED ORAL at 21:38

## 2019-06-11 RX ADMIN — HEPARIN SODIUM 5000 UNIT(S): 5000 INJECTION INTRAVENOUS; SUBCUTANEOUS at 05:38

## 2019-06-11 RX ADMIN — HEPARIN SODIUM 5000 UNIT(S): 5000 INJECTION INTRAVENOUS; SUBCUTANEOUS at 21:38

## 2019-06-11 RX ADMIN — HEPARIN SODIUM 5000 UNIT(S): 5000 INJECTION INTRAVENOUS; SUBCUTANEOUS at 14:29

## 2019-06-11 RX ADMIN — Medication 81 MILLIGRAM(S): at 12:38

## 2019-06-11 NOTE — PROGRESS NOTE ADULT - ASSESSMENT
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 EEG and speech evaluation

## 2019-06-11 NOTE — DISCHARGE NOTE NURSING/CASE MANAGEMENT/SOCIAL WORK - NSDCPEPTSTRK_GEN_ALL_CORE
Call 911 for stroke/Need for follow up after discharge/Stroke education booklet/Prescribed medications/Risk factors for stroke/Stroke warning signs and symptoms/Signs and symptoms of stroke/Stroke support groups for patients, families, and friends

## 2019-06-11 NOTE — PROGRESS NOTE ADULT - PROBLEM SELECTOR PLAN 1
-Patient is not verbalizing, flat mood   -CT head noted as above  -MR angio head /wo contrast nonsignificant.   - MR no cont head :   -Utox negative, blood alcohol level not significant   -No personal or family history of psychiatric illnesses, CVA, or seizure. Denies   -Neuro Dr. Bustamante consulted, recommended to start aspirin and statin  -Psych Dr. Salas consulted  -F/u MR head /w contrast- call placed to  for IV contrast consent- left message  -F/u TTE  -Neuro check Q4hrs  -Monitor on tele -Patient is not verbalizing, flat mood   -CT head noted as above  -MR angio head /wo contrast nonsignificant.   - MR no cont head : b/l thalamic stroke  -Utox negative, blood alcohol level not significant   -No personal or family history of psychiatric illnesses, CVA, or seizure. Denies   -c/w asa and statin   -Psych recc noted   - TTE : Normal EF   -Neuro check Q4hrs  -Monitor on tele  - f/u neuro reccomendations

## 2019-06-11 NOTE — PROGRESS NOTE ADULT - SUBJECTIVE AND OBJECTIVE BOX
Patient is a 33y old  Female who presents with a chief complaint of Inability to speak (10 John 2019 12:47)      INTERVAL HPI/OVERNIGHT EVENTS: No overnight events   Pt still non verbal ,ambulatory , currently on constant observation   Pt hemodynamically stable. Denies any chest pain, palpitations, SOB or headache.     MEDICATIONS  (STANDING):  aspirin  chewable 81 milliGRAM(s) Oral daily  atorvastatin 40 milliGRAM(s) Oral at bedtime  ergocalciferol 41079 Unit(s) Oral <User Schedule>  heparin  Injectable 5000 Unit(s) SubCutaneous every 8 hours    MEDICATIONS  (PRN):      Allergies    penicillin (Rash)    Intolerances        REVIEW OF SYSTEMS:  limited as pt is non verbal     Vital Signs Last 24 Hrs  T(C): 36.6 (2019 11:13), Max: 37 (10 John 2019 15:00)  T(F): 97.8 (2019 11:13), Max: 98.6 (10 John 2019 15:00)  HR: 71 (2019 11:13) (71 - 90)  BP: 110/59 (2019 11:13) (105/69 - 127/74)  BP(mean): --  RR: 16 (2019 11:13) (16 - 18)  SpO2: 100% (2019 11:13) (99% - 100%)    PHYSICAL EXAM:  GENERAL: NAD,  HEAD:  Atraumatic,   EYES: EOMI, PERRLA,   NECK: Supple, No JVD,   CHEST/LUNG: Clear to auscultation b/l  No rales, rhonchi, wheezing,   HEART: Regular rate and rhythm; No murmurs,   ABDOMEN: Soft, Nontender, Nondistended; Bowel sounds present  NERVOUS SYSTEM:  Alert & Awake     EXTREMITIES:   No clubbing, cyanosis, or edema  SKIN;    LABS:                        14.1   7.83  )-----------( 377      ( 10 John 2019 06:11 )             44.0     06-11    139  |  106  |  12  ----------------------------<  88  3.5   |  24  |  0.80    Ca    8.9      2019 07:02  Phos  3.2     06-10  Mg     2.4     06-10        Urinalysis Basic - ( 2019 14:48 )    Color: Yellow / Appearance: Clear / S.025 / pH: x  Gluc: x / Ketone: Negative  / Bili: Negative / Urobili: 1   Blood: x / Protein: 15 / Nitrite: Negative   Leuk Esterase: Negative / RBC: 5-10 /HPF / WBC 0-2 /HPF   Sq Epi: x / Non Sq Epi: Occasional /HPF / Bacteria: Few /HPF      CAPILLARY BLOOD GLUCOSE          RADIOLOGY & ADDITIONAL TESTS:    < from: MR Head No Cont (06.10.19 @ 19:01) >  IMPRESSION:    1. Subacute artery of Percheron infarction involving the bilateral   thalami. No hemorrhagic transformation.    2. Additional tiny acute cortical infarct within the left paramedian   occipital lobe.    3. Nonspecific patchy low marrow signal on T1-weighted imaging for which   anemia or other marrow infiltrative process is considered.      < end of copied text >

## 2019-06-11 NOTE — PROGRESS NOTE ADULT - PROBLEM SELECTOR PLAN 2
- f/u MRI with IV contrast   - lorazepam on call for MRI  - left message for  Abena Albarado [phone]  - neurology follow up- dr. Cao to see today  - c/w 1:1 for safety vitamin D 10   started on ergocalciferol

## 2019-06-11 NOTE — PROGRESS NOTE ADULT - SUBJECTIVE AND OBJECTIVE BOX
Patient is a 33y old  Female who presents with a chief complaint of Inability to speak (10 John 2019 12:47)      INTERVAL HPI/OVERNIGHT EVENTS: No overnight events   Pt still non verbal ,ambulatory , currently on constant observation   Pt hemodynamically stable. Denies any chest pain, palpitations, SOB or headache.     MEDICATIONS  (STANDING):  aspirin  chewable 81 milliGRAM(s) Oral daily  atorvastatin 40 milliGRAM(s) Oral at bedtime  ergocalciferol 29778 Unit(s) Oral <User Schedule>  heparin  Injectable 5000 Unit(s) SubCutaneous every 8 hours    MEDICATIONS  (PRN):      Allergies    penicillin (Rash)    Intolerances        REVIEW OF SYSTEMS:  limited as pt is non verbal     Vital Signs Last 24 Hrs  T(C): 36.6 (2019 11:13), Max: 37 (10 John 2019 15:00)  T(F): 97.8 (2019 11:13), Max: 98.6 (10 John 2019 15:00)  HR: 71 (2019 11:13) (71 - 90)  BP: 110/59 (2019 11:13) (105/69 - 127/74)  BP(mean): --  RR: 16 (2019 11:13) (16 - 18)  SpO2: 100% (2019 11:13) (99% - 100%)    PHYSICAL EXAM:  GENERAL: NAD,  HEAD:  Atraumatic,   EYES: EOMI, PERRLA,   NECK: Supple, No JVD,   CHEST/LUNG: Clear to auscultation b/l  No rales, rhonchi, wheezing,   HEART: Regular rate and rhythm; No murmurs,   ABDOMEN: Soft, Nontender, Nondistended; Bowel sounds present  NERVOUS SYSTEM:  Increased sleep but easily awakens and is fully alert when awakened; Fluent when she speaks but does not initiate speech spontaneously; repetition and naming normal; LUPE, VF full, EOM full without nystagmus; facial sensations, jaw strength, facial movements, hearing, palate, neck, shoulder and tongue are normal and symmetrical; Tone strength sensation for touch, pain and position/vibration are normal; DTR normal; Plantar flexor. finger-to-nose and heel-to-shin normal. able to walk tandem  EXTREMITIES:   No clubbing, cyanosis, or edema  SKIN;    LABS:                        14.1   7.83  )-----------( 377      ( 10 John 2019 06:11 )             44.0     06-11    139  |  106  |  12  ----------------------------<  88  3.5   |  24  |  0.80    Ca    8.9      2019 07:02  Phos  3.2     06-10  Mg     2.4     06-10        Urinalysis Basic - ( 2019 14:48 )    Color: Yellow / Appearance: Clear / S.025 / pH: x  Gluc: x / Ketone: Negative  / Bili: Negative / Urobili: 1   Blood: x / Protein: 15 / Nitrite: Negative   Leuk Esterase: Negative / RBC: 5-10 /HPF / WBC 0-2 /HPF   Sq Epi: x / Non Sq Epi: Occasional /HPF / Bacteria: Few /HPF      CAPILLARY BLOOD GLUCOSE          RADIOLOGY & ADDITIONAL TESTS:    < from: MR Head No Cont (06.10.19 @ 19:01) >  IMPRESSION:    1. Subacute artery of Percheron infarction involving the bilateral   thalami. No hemorrhagic transformation.    2. Additional tiny acute cortical infarct within the left paramedian   occipital lobe.    3. Nonspecific patchy low marrow signal on T1-weighted imaging for which   anemia or other marrow infiltrative process is considered.      < end of copied text >

## 2019-06-12 DIAGNOSIS — I63.9 CEREBRAL INFARCTION, UNSPECIFIED: ICD-10-CM

## 2019-06-12 LAB
AT III ACT/NOR PPP CHRO: 111 % — SIGNIFICANT CHANGE UP (ref 85–135)
B2 GLYCOPROT1 AB SER QL: NEGATIVE — SIGNIFICANT CHANGE UP
CARDIOLIPIN AB SER-ACNC: NEGATIVE — SIGNIFICANT CHANGE UP
DRVVT SCREEN TO CONFIRM RATIO: SIGNIFICANT CHANGE UP
FACT X ACT/NOR PPP: 104 % — SIGNIFICANT CHANGE UP (ref 70–170)
HCYS SERPL-MCNC: 6.9 UMOL/L — SIGNIFICANT CHANGE UP
LA NT DPL PPP QL: 34.3 SEC — SIGNIFICANT CHANGE UP
PROT C ACT/NOR PPP: 141 % — SIGNIFICANT CHANGE UP (ref 74–150)

## 2019-06-12 PROCEDURE — 99233 SBSQ HOSP IP/OBS HIGH 50: CPT

## 2019-06-12 RX ORDER — HALOPERIDOL DECANOATE 100 MG/ML
5 INJECTION INTRAMUSCULAR ONCE
Refills: 0 | Status: COMPLETED | OUTPATIENT
Start: 2019-06-12 | End: 2019-06-12

## 2019-06-12 RX ORDER — HALOPERIDOL DECANOATE 100 MG/ML
5 INJECTION INTRAMUSCULAR EVERY 6 HOURS
Refills: 0 | Status: DISCONTINUED | OUTPATIENT
Start: 2019-06-12 | End: 2019-06-18

## 2019-06-12 RX ORDER — CLOPIDOGREL BISULFATE 75 MG/1
75 TABLET, FILM COATED ORAL DAILY
Refills: 0 | Status: DISCONTINUED | OUTPATIENT
Start: 2019-06-12 | End: 2019-06-18

## 2019-06-12 RX ORDER — HALOPERIDOL DECANOATE 100 MG/ML
1 INJECTION INTRAMUSCULAR ONCE
Refills: 0 | Status: COMPLETED | OUTPATIENT
Start: 2019-06-12 | End: 2019-06-12

## 2019-06-12 RX ORDER — DIPHENHYDRAMINE HCL 50 MG
50 CAPSULE ORAL ONCE
Refills: 0 | Status: COMPLETED | OUTPATIENT
Start: 2019-06-12 | End: 2019-06-12

## 2019-06-12 RX ADMIN — Medication 50 MILLIGRAM(S): at 22:37

## 2019-06-12 RX ADMIN — HALOPERIDOL DECANOATE 1 MILLIGRAM(S): 100 INJECTION INTRAMUSCULAR at 16:07

## 2019-06-12 RX ADMIN — ATORVASTATIN CALCIUM 40 MILLIGRAM(S): 80 TABLET, FILM COATED ORAL at 22:46

## 2019-06-12 RX ADMIN — HALOPERIDOL DECANOATE 5 MILLIGRAM(S): 100 INJECTION INTRAMUSCULAR at 16:07

## 2019-06-12 RX ADMIN — SODIUM CHLORIDE 70 MILLILITER(S): 9 INJECTION, SOLUTION INTRAVENOUS at 06:05

## 2019-06-12 RX ADMIN — HEPARIN SODIUM 5000 UNIT(S): 5000 INJECTION INTRAVENOUS; SUBCUTANEOUS at 22:46

## 2019-06-12 RX ADMIN — HALOPERIDOL DECANOATE 5 MILLIGRAM(S): 100 INJECTION INTRAMUSCULAR at 22:36

## 2019-06-12 NOTE — PROGRESS NOTE ADULT - ASSESSMENT
Discussed with Dr Hennessy and Dr De Jesus. Aspirin now. She is not competent to make decisions by herself because of the bilateral thalamic strokes and the poor judgement and analytical abilities she exhibits. Discussed with Dr Hennessy and Dr De Jesus. Aspirin now. She is not competent to make decisions by herself because of the bilateral thalamic strokes and the poor judgement and analytical abilities she exhibits. HILARIA indicates PFO with septal aneurysm and R to Left shunt. Awaiting hypercoagulable  state investigations. Recommend aspirin clopidogrel atorvastatin for now

## 2019-06-12 NOTE — PROGRESS NOTE ADULT - SUBJECTIVE AND OBJECTIVE BOX
Patient is a 33y old  Female who presents with a chief complaint of Inability to speak (11 Jun 2019 14:03)      INTERVAL HPI/OVERNIGHT EVENTS: No overnight events . PT able to speak but no comprehending , understanding impaired , wants to go home , .  at bedside .   pt currently on constant observation. ambulating on the floor .   Speech and swallow and EEG deferred today as pt is non cooperative , had to sedate the patient , psych consulted   Pt hemodynamically stable. Denies any chest pain, palpitations, SOB or headache.     MEDICATIONS  (STANDING):  aspirin  chewable 81 milliGRAM(s) Oral daily  atorvastatin 40 milliGRAM(s) Oral at bedtime  clopidogrel Tablet 75 milliGRAM(s) Oral daily  dextrose 5% + sodium chloride 0.9%. 1000 milliLiter(s) (70 mL/Hr) IV Continuous <Continuous>  ergocalciferol 38355 Unit(s) Oral <User Schedule>  heparin  Injectable 5000 Unit(s) SubCutaneous every 8 hours    MEDICATIONS  (PRN):  diphenhydrAMINE   Injectable 50 milliGRAM(s) IntraMuscular once PRN Rash and/or Itching      Allergies    penicillin (Rash)    Intolerances        REVIEW OF SYSTEMS:  limited due mental status    Vital Signs Last 24 Hrs  T(C): 36.8 (12 Jun 2019 13:00), Max: 36.8 (12 Jun 2019 04:47)  T(F): 98.2 (12 Jun 2019 13:00), Max: 98.3 (12 Jun 2019 04:47)  HR: 92 (12 Jun 2019 16:33) (76 - 92)  BP: 112/73 (12 Jun 2019 16:33) (110/75 - 120/72)  BP(mean): --  RR: 18 (12 Jun 2019 16:33) (18 - 18)  SpO2: 99% (12 Jun 2019 16:33) (95% - 100%)    PHYSICAL EXAM:  GENERAL: NAD,  HEAD:  Atraumatic,   EYES: EOMI, PERRLA,   NECK: Supple, No JVD,   CHEST/LUNG: Clear to auscultation b/l  No rales, rhonchi, wheezing,   HEART: Regular rate and rhythm; No murmurs,   ABDOMEN: Soft, Nontender, Nondistended; Bowel sounds present  NERVOUS SYSTEM:  Alert & awake , dec comprehension , lacks understanding , able to speak   EXTREMITIES:   No clubbing, cyanosis, or edema  SKIN;    LABS:    06-11    139  |  106  |  12  ----------------------------<  88  3.5   |  24  |  0.80    Ca    8.9      11 Jun 2019 07:02          CAPILLARY BLOOD GLUCOSE          RADIOLOGY & ADDITIONAL TESTS:

## 2019-06-12 NOTE — PROGRESS NOTE ADULT - PROBLEM SELECTOR PLAN 1
-Patient able to speak , but not understanding , comprehending or cooperating   -MR angio head /wo contrast nonsignificant.   - MR no cont head : b/l thalamic stroke  -Utox negative, blood alcohol level not significant   - HILARIA done for concern of embolic stroke : showed no cardiac thrombus but ASD with PFO   -c/w asa and statin . added plavix   -f/u speech and language   - f/u EEG , likely tmrw   - TTE : Normal EF   -regular Neuro checks  - pt removed tele box, pulled out IV line   - psych consulted   - hyper coagulable w/u -ve so far , f/u final results   - f/u neuro recommendations

## 2019-06-12 NOTE — PHARMACOTHERAPY INTERVENTION NOTE - COMMENTS
Clarified with MD whether patient needs DVT prophylaxis. Pt is 34 y/o and ambulating. MD said that there is a high suspicion that pt had stroke.   And want heparin prophylaxis to be continued.

## 2019-06-12 NOTE — PROGRESS NOTE ADULT - SUBJECTIVE AND OBJECTIVE BOX
INTERVAL HPI/OVERNIGHT EVENTS: Unreasonably upset and insistent on going home. Her  too would like her to stay and be treated. There is no one at home, but her  and infant son. Her sister is expected to arrive from [place] tomorrow. She said her mother is at home, but her mother is in [place].      HEALTH ISSUES - PROBLEM Dx:  Thalamic stroke: Thalamic stroke  Vitamin D deficiency: Vitamin D deficiency  Encephalopathy, unspecified: Encephalopathy, unspecified  Aphasia: Aphasia  Need for prophylactic measure: Need for prophylactic measure  Mutism: Mutism          MEDICATIONS  (STANDING):  aspirin  chewable 81 milliGRAM(s) Oral daily  atorvastatin 40 milliGRAM(s) Oral at bedtime  clopidogrel Tablet 75 milliGRAM(s) Oral daily  dextrose 5% + sodium chloride 0.9%. 1000 milliLiter(s) (70 mL/Hr) IV Continuous <Continuous>  ergocalciferol 45516 Unit(s) Oral <User Schedule>  heparin  Injectable 5000 Unit(s) SubCutaneous every 8 hours    MEDICATIONS  (PRN):  diphenhydrAMINE   Injectable 50 milliGRAM(s) IntraMuscular once PRN Rash and/or Itching      Allergies    penicillin (Rash)    Intolerances        REVIEW OF SYSTEMS    REVIEW OF SYSTEMS:    CONSTITUTIONAL: No weakness, fatigue, malaise, fevers or chills, no weight change, appetite change  EYES: No visual changes; No double vision,  No vertigo, eye pain  Ears: no otalgia, no otorhea, no hearing loss, tinnitus  Nose: no epistaxis, rhinorrhea, post-discharge, sinus pressure  Throat: no throat pain, no oral lesions, tooth pain   NECK: No pain or stiffness  RESPIRATORY: No cough (productive or dry), wheezing, hemoptysis; No shortness of breath, orthnopnea, PND, AKHTAR, snoring,  CARDIOVASCULAR: No chest pain or palpitations, no leg edema, no claudication    GASTROINTESTINAL: No abdominal or epigastric pain. No nausea, vomiting, or hematemesis; No diarrhea or constipation. No melena or hematochezia.  GENITOURINARY: No dysuria, frequency, urgency or hematuria, no pelvic pain, urinary incontinence, urgency  Muscloskeletal: no joints or muscle pain, no swelling in joints or muscles  NEUROLOGICAL: No numbness or weakness, headache, memory loss, seizures, dizziness, vertigo, syncope, ataxia  SKIN: No pruritis, rashes, lesions or new moles  Psych: Stress would like to leave the hospital. Paranoid  Endocrine: No Heat or Cold intolerance, polydipsia, polyphagia  Heme/Lymph: no LN enlargement, no easy bruising or bleeding       Vital Signs Last 24 Hrs  T(C): 36.8 (12 Jun 2019 13:00), Max: 36.8 (12 Jun 2019 04:47)  T(F): 98.2 (12 Jun 2019 13:00), Max: 98.3 (12 Jun 2019 04:47)  HR: 92 (12 Jun 2019 16:33) (76 - 92)  BP: 112/73 (12 Jun 2019 16:33) (110/75 - 120/72)  BP(mean): --  RR: 18 (12 Jun 2019 16:33) (18 - 18)  SpO2: 99% (12 Jun 2019 16:33) (95% - 100%)    PHYSICAL EXAM:  GENERAL: NAD,  HEAD:  Atraumatic,   EYES: EOMI, PERRLA,   NECK: Supple, No JVD,   CHEST/LUNG: Clear to auscultation b/l  No rales, rhonchi, wheezing,   HEART: Regular rate and rhythm; No murmurs,   ABDOMEN: Soft, Nontender, Nondistended; Bowel sounds present  NERVOUS SYSTEM:  Increased sleep but easily awakens and is fully alert when awakened; Fluent when she speaks but does not initiate speech spontaneously; repetition and naming abnormal; LUPE, VF full, EOM full without nystagmus; facial sensations, jaw strength, facial movements, hearing, palate, neck, shoulder and tongue are normal and symmetrical; Tone strength sensation for touch, pain and position/vibration are normal; DTR normal; Plantar flexor. finger-to-nose and heel-to-shin normal. able to walk tandem  EXTREMITIES:   No clubbing, cyanosis, or edema  LABS:    06-11    139  |  106  |  12  ----------------------------<  88  3.5   |  24  |  0.80    Ca    8.9      11 Jun 2019 07:02            RADIOLOGY & ADDITIONAL TESTS:  < from: MR Head No Cont (06.10.19 @ 19:01) >    EXAM:  MR BRAIN                            PROCEDURE DATE:  06/10/2019          INTERPRETATION:  .    CLINICAL INFORMATION: Mucous and flat affect for 2 days. Evaluate for   thalamic infarct seen on CT today.    TECHNIQUE: Multiplanar multisequential MRI of the brain was acquired   without the administration of IV gadolinium.    COMPARISON: Prior head CT examination from 6/9/2019.    FINDINGS: Areas of high signal are notable within the bilateral thalami   on diffusion-weighted imaging with corresponding isointense to high   signal on ADC map. There is associated T2/FLAIR prolongation in this area   of. The left side appears larger than the right.    There is an additional punctate area of restricted diffusion within the   left paramedian occipital cortex (series 6, image 51).    No other areas of abnormal restricted diffusion are noted. Otherwise, the   brain parenchyma demonstrates normal morphology and signal.     There is an incidental cavum septum pellucidum et vergae. There is no   hydrocephalus. No abnormal extra-axial fluid collections are notable.   Flow-voids are noted throughout the major intracranial vessels, on the T2   weighted images, consistent with their patency.    There is scattered mucosal thickening throughout the paranasal sinuses,   most notably affecting the right maxillary sinus. A trace left sided   mastoid air cell effusion is notable. The right mastoid air cells are   clear. Calvarial signal appears unremarkable. The orbits are within   normal limits.Patchy low marrow signal on T1-weighted imaging within the   vertebral bodies is nonspecific.    IMPRESSION:    1. Subacute artery of Percheron infarction involving the bilateral   thalami. No hemorrhagic transformation.    2. Additional tiny acute cortical infarct within the left paramedian   occipital lobe.    3. Nonspecific patchy low marrow signal on T1-weighted imaging for which   anemia or other marrow infiltrative process is considered.        CARLOS GALLEGOS M.D., ATTENDING RADIOLOGIST  This document has been electronically signed. John 10 2019  7:10PM          < end of copied text >  < from: HILARIA w/Doppler (06.12.19 @ 15:32) >  Patient name: PRAFUL NIETO  YOB: 1985   Age: 33 (F)   MR#: [id number]  Study Date: 6/12/2019  Location: [address] [place], [place] [address]onographer: Kathe Mesa SEBASTIÁN  Study quality: Good  Referring Physician:  RACHEL BLACKWELL MD  Blood Pressure: 108/76 mmHg  Height: 167 cm  Weight: 88 kg  BSA: 2 m2  ------------------------------------------------------------------------    PROCEDURE: Transesophageal echocardiogram was performed in  the echocardiography laboratory.  The patient was sedated  with Propofol  200 mg IV.  The procedure was monitored with  automatic blood pressure monitoring,  ECG tracings and  pulse oximetry. Gag reflex was abolished with topical  lidocaine and the transesophageal probe was placed in the  esophagus posterior to the heart without any complications.   The patient tolerated the procedure well.  Patient was injected with 10 cc's of aerosolized saline.  INDICATION: Other cerebrovascular disease (I67.89)  HISTORY:  ------------------------------------------------------------------------  DIMENSIONS:  Dimensions:     Normal Values:  LA:       ---     2.0 - 4.0 cm  Ao:     3.0 cm    2.0 - 3.8 cm  SEPTUM:   ---     0.6 - 1.2 cm  PWT:      ---     0.6 - 1.1 cm  LVIDd:    ---     3.0 - 5.6 cm  LVIDs:    --- 1.8 - 4.0 cm        ------------------------------------------------------------------------  OBSERVATIONS:  Mitral Valve: Normal mitral valve. Mitral valve appears  normal on 3D reconstruction. Trace mitral regurgitation.  Aortic Root: Aortic Root:3.0 cm.  Ascending Aorta: 2.8 cm.   Normal aortic root. No atheroma is seen in the descending  aorta and aortic arch.  Aortic Valve: Normal trileaflet aortic valve. No aortic  stenosis. No aortic valve regurgitation seen.  Left Atrium: No left atrialor left atrial appendage  thrombus. Left atrial appendage velocity is normal at 0.91  m/s  Left Ventricle: Normal Left Ventricular Systolic Function,  (EF = 55 to 60%) Diastolic function not assessed.  Right Heart: No clot seen in right atrium. Normal right  ventricular size and function. Normal tricuspid valve.  Trace tricuspid regurgitation. Normal pulmonic valve. Trace  pulmonic insufficiency is noted.  Pericardium/PleuraNo pericardial effusion.  Hemodynamic: An atrial septal aneurysm is noted. Agitated  saline injection revealed bubbles in the left heart,  consistent with patent foramen ovale or atrial septal  defect.  ------------------------------------------------------------------------  CONCLUSIONS:  1. Normal mitral valve. Mitral valve appears normal on 3D  reconstruction. Trace mitral regurgitation.  2. Normal trileaflet aortic valve. No aortic stenosis. No  aortic valve regurgitation seen.  3. Normal aortic root. No atheroma is seen in the  descending aorta and aortic arch.  4. No left atrial or left atrial appendage thrombus.  5. Normal Left Ventricular Systolic Function,  (EF = 55 to  60%)  6. No clot seen in right atrium.  7. Normal right ventricular size and function.  8. Normal tricuspid valve. Trace tricuspid regurgitation.  9. Normal pulmonic valve. Trace pulmonic insufficiency is  noted.  10. An atrial septal aneurysm is noted. Agitated saline  injection revealed bubbles in the left heart, consistent  with patent foramen ovale or atrial septal defect.  11. No pericardial effusion.  12. No cardiac source of embolus is identified.    *** Compared with echocardiogram of 6/10/2019, a HILARIA was  performed and additional information provided.  ------------------------------------------------------------------------  Confirmed on  6/12/2019 - 11:45:51 by Trae Hennessy MD  ------------------------------------------------------------------------    < end of copied text >

## 2019-06-13 PROCEDURE — 95819 EEG AWAKE AND ASLEEP: CPT | Mod: 26

## 2019-06-13 RX ORDER — DIPHENHYDRAMINE HCL 50 MG
50 CAPSULE ORAL EVERY 6 HOURS
Refills: 0 | Status: DISCONTINUED | OUTPATIENT
Start: 2019-06-13 | End: 2019-06-13

## 2019-06-13 RX ORDER — DIPHENHYDRAMINE HCL 50 MG
50 CAPSULE ORAL EVERY 6 HOURS
Refills: 0 | Status: DISCONTINUED | OUTPATIENT
Start: 2019-06-13 | End: 2019-06-18

## 2019-06-13 RX ADMIN — HEPARIN SODIUM 5000 UNIT(S): 5000 INJECTION INTRAVENOUS; SUBCUTANEOUS at 05:23

## 2019-06-13 RX ADMIN — HALOPERIDOL DECANOATE 5 MILLIGRAM(S): 100 INJECTION INTRAMUSCULAR at 23:00

## 2019-06-13 RX ADMIN — Medication 50 MILLIGRAM(S): at 15:45

## 2019-06-13 RX ADMIN — HALOPERIDOL DECANOATE 5 MILLIGRAM(S): 100 INJECTION INTRAMUSCULAR at 15:16

## 2019-06-13 RX ADMIN — Medication 50 MILLIGRAM(S): at 23:38

## 2019-06-13 NOTE — PROGRESS NOTE BEHAVIORAL HEALTH - NSBHCHARTREVIEWIMAGING_PSY_A_CORE FT
MRI: There are focal hypodensities involving bilateral thalami may be related   to underlying ischemic infarct, toxic or metabolic processes, ischemic   etiologies may include consider top of basilar artery of Percheron   infarct, or venous occlusion, or related to substance abuse, less likely   metabolic Theo's disease or less likely CJD,  or neoplasm. Given the   acuity of patient's symptoms ischemic etiologies are favored. There is no   hemorrhage, contusion or extraaxial collection. There is no acute   hydrocephalus. The visualized posterior fossa structures are otherwise   intact. There are scattered intracranial arterial calcification.  The   visualized paranasal sinuses are clear.    normal TTE MRI: There are focal hypodensities involving bilateral thalami may be related   to underlying ischemic infarct, toxic or metabolic processes, ischemic   etiologies may include consider top of basilar artery of Percheron   infarct, or venous occlusion, or related to substance abuse, less likely   metabolic Theo's disease or less likely CJD,  or neoplasm. Given the   acuity of patient's symptoms ischemic etiologies are favored. There is no   hemorrhage, contusion or extraaxial collection. There is no acute   hydrocephalus. The visualized posterior fossa structures are otherwise   intact. There are scattered intracranial arterial calcification.  The   visualized paranasal sinuses are clear.    TTE: An atrial septal aneurysm is noted. Agitated saline injection revealed bubbles in the left heart, consistent  with patent foramen ovale or atrial septal defect.

## 2019-06-13 NOTE — PROGRESS NOTE ADULT - PROBLEM SELECTOR PLAN 1
-Patient able to speak , but not understanding , comprehending or cooperating   -MR angio head /wo contrast nonsignificant.   - MR no cont head : b/l thalamic stroke  -Utox negative, blood alcohol level not significant   - HILARIA done for concern of embolic stroke : showed no cardiac thrombus but ASD with PFO   - cardio f/u for possible transfer for PFO closure   -c/w asa and statin . added plavix   -speech and language eval noted   -   - f/u EEG ,    - TTE : Normal EF   -regular Neuro checks  - pt removed tele box, pulled out IV line   - psych consulted   - hyper coagulable w/u -ve so far , f/u final results   - f/u neuro recommendations -Patient able to speak , but not understanding , comprehending or cooperating   -MR angio head /wo contrast nonsignificant.   - MR no cont head : b/l thalamic stroke  -Utox negative, blood alcohol level not significant   - HILARIA done for concern of embolic stroke : showed no cardiac thrombus but ASD with PFO   - cardio f/u for possible transfer for PFO closure   -c/w asa and statin and  plavix   -speech and language eval noted   - Psych eval: no capacity to make decisions , mental status improving overall   - f/u EEG ,    - TTE : Normal EF   -regular Neuro checks  - hyper coagulable w/u -ve so far , f/u final results   - f/u neuro

## 2019-06-13 NOTE — SPEECH LANGUAGE PATHOLOGY EVALUATION - SLP GENERAL OBSERVATIONS
Pt received OOB, seated upright in chair for exam. SCA 1:1 supervision present. Pt AA+Ox2-3 (name, "[facility]", "2019"). Appeared sluggish, at times confused, but very cooperative with exam. Anecdotal report + observation suggests Pt may have increased willingness to work with female staff vs male.

## 2019-06-13 NOTE — SPEECH LANGUAGE PATHOLOGY EVALUATION - SLP EXECUTIVE FUNCTION DEFICITS NOTED
inhibition/planning/initiation/insight/awareness/organization/impulsivity/mental flexibility/self-correction/self-monitoring

## 2019-06-13 NOTE — PROGRESS NOTE ADULT - SUBJECTIVE AND OBJECTIVE BOX
Patient is a 33y old  Female who presents with a chief complaint of Inability to speak (12 Jun 2019 17:01)      INTERVAL HPI/OVERNIGHT EVENTS: No overnight events   Pt speaking today , told pt today regarding her condition, plan for PFO closure but she is not able to under stand the risk of medical condition and currently is refusing the intervention.   Pt hemodynamically stable. Denies any chest pain, palpitations, SOB or headache.     MEDICATIONS  (STANDING):  aspirin  chewable 81 milliGRAM(s) Oral daily  atorvastatin 40 milliGRAM(s) Oral at bedtime  clopidogrel Tablet 75 milliGRAM(s) Oral daily  dextrose 5% + sodium chloride 0.9%. 1000 milliLiter(s) (70 mL/Hr) IV Continuous <Continuous>  ergocalciferol 36615 Unit(s) Oral <User Schedule>  heparin  Injectable 5000 Unit(s) SubCutaneous every 8 hours    MEDICATIONS  (PRN):  haloperidol    Injectable 5 milliGRAM(s) IntraMuscular every 6 hours PRN Agitation      Allergies    penicillin (Rash)    Intolerances        REVIEW OF SYSTEMS: limited   CONSTITUTIONAL: No fever,  RESPIRATORY: No cough, shortness of breath  CARDIOVASCULAR: No chest pain,  GASTROINTESTINAL: No abdominal  pain,nausea, vomiting,   NEUROLOGICAL: No headaches  SKIN: No rash     Vital Signs Last 24 Hrs  T(C): 36.8 (13 Jun 2019 11:28), Max: 36.8 (12 Jun 2019 13:00)  T(F): 98.2 (13 Jun 2019 11:28), Max: 98.2 (12 Jun 2019 13:00)  HR: 88 (13 Jun 2019 11:28) (82 - 96)  BP: 101/64 (13 Jun 2019 11:28) (101/64 - 114/82)  BP(mean): --  RR: 18 (13 Jun 2019 11:28) (18 - 18)  SpO2: 94% (13 Jun 2019 11:28) (94% - 100%)    PHYSICAL EXAM:  GENERAL: NAD,  HEAD:  Atraumatic,   EYES: EOMI, PERRLA,   NECK: Supple, No JVD,   CHEST/LUNG: Clear to auscultation b/l  No rales, rhonchi, wheezing,   HEART: Regular rate and rhythm; No murmurs,   ABDOMEN: Soft, Nontender, Nondistended; Bowel sounds present  NERVOUS SYSTEM:  Alert & Oriented X2,  poor judgment and insight   EXTREMITIES:   No clubbing, cyanosis, or edema  SKIN;    LABS:              CAPILLARY BLOOD GLUCOSE          RADIOLOGY & ADDITIONAL TESTS:

## 2019-06-13 NOTE — PROGRESS NOTE BEHAVIORAL HEALTH - NSBHCHARTREVIEWVS_PSY_A_CORE FT
ICU Vital Signs Last 24 Hrs  T(C): 36.8 (13 Jun 2019 11:28), Max: 36.8 (12 Jun 2019 13:00)  T(F): 98.2 (13 Jun 2019 11:28), Max: 98.2 (12 Jun 2019 13:00)  HR: 88 (13 Jun 2019 11:28) (82 - 96)  BP: 101/64 (13 Jun 2019 11:28) (101/64 - 114/82)  BP(mean): --  ABP: --  ABP(mean): --  RR: 18 (13 Jun 2019 11:28) (18 - 18)  SpO2: 94% (13 Jun 2019 11:28) (94% - 100%)

## 2019-06-13 NOTE — SPEECH LANGUAGE PATHOLOGY EVALUATION - SLP PRAGMATICS
no reciprocity in communication exchange/flat affect/turn taking deficits/poor eye contact/initiation problems

## 2019-06-13 NOTE — SPEECH LANGUAGE PATHOLOGY EVALUATION - COMMENTS
PATIENT PREFERS TO BE CALLED BY HER NICKNAME "YEISON" ("[facility]").  PATIENT RECEIVED WRITTEN TEACHING ON:   1. Signs and symptoms of stroke.  2. What to do if they are having a stroke.  3. Activation of 911.  4. Modifying risk factors.  5. Discharge medications.  6. The importance of compliance and the need for follow up. Pt has been known during this admission to become suddenly non-verbal (neurological vs psychogenic component). At these episodes, Pt has been observed making wants/needs known via head nods and gesturing/ walking to preferred items/areas. Noted during exam to remove her garments, exposing herself, and stated, "I'm cold". Additionally, Pt noted to get out of chair mid-exam w/o notice. Pragmatic awareness is significantly impaired. PATIENT PREFERS TO BE CALLED BY HER NICKNAME "YEISON" ("[facility]").    PATIENT RECEIVED WRITTEN TEACHING ON:   1. Signs and symptoms of stroke.  2. What to do if they are having a stroke.  3. Activation of 911.  4. Modifying risk factors.  5. Discharge medications.  6. The importance of compliance and the need for follow up.

## 2019-06-13 NOTE — SPEECH LANGUAGE PATHOLOGY EVALUATION - SLP PERTINENT HISTORY OF CURRENT PROBLEM
32 y/o F pt with no significant PMHx brought to ED by  for nonverbal status, flat mood, drowsiness for past 3 days. History primarily obtained through . Inability to speak, mood changes, and drowsiness are abrupt changes from her baseline. Pt never had these set of symptoms. 34 y/o F pt with no significant PMHx brought to ED by  for nonverbal status, flat mood, drowsiness for past 3 days. History primarily obtained through . Inability to speak, mood changes, and drowsiness are abrupt changes from her baseline. Recent report (SW, neuro, Hx intake from spouse) indicates prior h/o substance abuse (incl OD) + behavioral concerns.

## 2019-06-13 NOTE — SPEECH LANGUAGE PATHOLOGY EVALUATION - SLP ANTICIPATED DISCHARGE DISPOSITION
Rehab Tx (acute, if possible) to restore Pt to optimal independent communicative function, as per Pt's baseline prior to hospitalization. Pt is motivated to regain prior level of function & upon d/c, would benefit from multidisciplinary restorative therapies as determined by rehab. center./rehabilitation facility

## 2019-06-13 NOTE — PROGRESS NOTE BEHAVIORAL HEALTH - NSBHFUPINTERVALHXFT_PSY_A_CORE
Pt seen in private room with Dr Brizuela for capacity evaluation. Feels comfortable denied acute psych issues including depression/SI/HI  It was explained to her that a shunt was discovered in her heart that needs surgical closure ASAP in order to avoid repeated formation of clots and another stroke that can potentially be life threatening. Patient said she did not understand this and did not make an effort to repeat it back to writer. She said she did not want the procedure now, maybe in 2-3 years. Did not seem to appreciate the potentially dire consequences of not getting it done now.  pt with an episode of agitation yesterday - wanted to leave; started walking towards the elevator needed to be medicated, now on CO 1:1

## 2019-06-13 NOTE — PROGRESS NOTE BEHAVIORAL HEALTH - SUMMARY
33  [place] female with no prior psych history and no significant PMHx brought to ED by  for nonverbal status, flat mood, drowsiness for past 3 days dx'd with stroke and cardiac shunt that needs PFO closure was consulted for capacity assessment. Pt did not demonstrate capacity into her medical condition due to the lack of her ability to understand and apply facts to her condition, inability to reason about why she would not get the procedure done now. Given the rapid improvement in her mental status the past couple days, pt may regain her capacity in a couple days so it is worth trying to work with her. Alternatively, if her condition requires immediate surgical intervention in order to save her life, she should undergo the procedure without her consent.

## 2019-06-13 NOTE — PROGRESS NOTE BEHAVIORAL HEALTH - PRN MEDS
Haldol and Benadryl yesterday for agitation and wanting to leave AMA, started walking towards the elevator

## 2019-06-13 NOTE — SPEECH LANGUAGE PATHOLOGY EVALUATION - SLP DIAGNOSIS
Pt p/w cognitive communication impairment, c/b reduced attention, memory, organization, and executive functioning.

## 2019-06-14 ENCOUNTER — TRANSCRIPTION ENCOUNTER (OUTPATIENT)
Age: 34
End: 2019-06-14

## 2019-06-14 PROCEDURE — 99232 SBSQ HOSP IP/OBS MODERATE 35: CPT

## 2019-06-14 RX ORDER — ASPIRIN/CALCIUM CARB/MAGNESIUM 324 MG
1 TABLET ORAL
Qty: 30 | Refills: 0
Start: 2019-06-14 | End: 2019-07-13

## 2019-06-14 RX ORDER — ERGOCALCIFEROL 1.25 MG/1
1 CAPSULE ORAL
Qty: 4 | Refills: 0
Start: 2019-06-14 | End: 2019-07-13

## 2019-06-14 RX ORDER — CLOPIDOGREL BISULFATE 75 MG/1
1 TABLET, FILM COATED ORAL
Qty: 30 | Refills: 0
Start: 2019-06-14 | End: 2019-07-13

## 2019-06-14 RX ORDER — ATORVASTATIN CALCIUM 80 MG/1
1 TABLET, FILM COATED ORAL
Qty: 30 | Refills: 0
Start: 2019-06-14 | End: 2019-07-13

## 2019-06-14 RX ADMIN — ATORVASTATIN CALCIUM 40 MILLIGRAM(S): 80 TABLET, FILM COATED ORAL at 21:46

## 2019-06-14 RX ADMIN — HEPARIN SODIUM 5000 UNIT(S): 5000 INJECTION INTRAVENOUS; SUBCUTANEOUS at 21:46

## 2019-06-14 NOTE — DISCHARGE NOTE PROVIDER - NSDCCPCAREPLAN_GEN_ALL_CORE_FT
PRINCIPAL DISCHARGE DIAGNOSIS  Diagnosis: Stroke  Assessment and Plan of Treatment: Patient (pt) was diagnosed with thalamic stroke. Patient unable to speak, although understands spoken language.  -MR angio head without contrast nonsignificant.  -MR no cont head showed bilateral thalamic stroke.  -HILARIA done for concern of embolic stroke showed no cardiac thrombus but ASD with PFO  -Cardiology followed the case for possible transfer for PFO closure   -Pt started on aspirin, atorvastatin, and plavix for stroke prevention.  EEG showed:  1. Mild nonspecific diffuse or multifocal cerebral dysfunction.   2. No epileptiform abnormalities were recorded.  -Echocardiogram was nog significant.  -Neurology Dr. Cao followed the case  -Hypercoagulable workups were negative  -Follow up with Dr. Cao as an outpatient within 1 week of discharge at:  Address: [address] [place] [address] [address]nd floor, [place] [address]  Phone: [phone]  -Follow up with your PCP within 1 week of discharge   -Continue taking prescribed aspirin, atorvastatin, and plavix

## 2019-06-14 NOTE — PROGRESS NOTE ADULT - ASSESSMENT
The EEG could be consistent with bilateral thalamic dysfunction because of [id number]-cydzsnm-pzobvbse loops that are involved in generation of EEG activity which is in turn related to normal cognition and resting state networks. However, her current symptoms have only responded to anti-psychotics which will be continued until judgement and insight recover sufficiently to allow discharge

## 2019-06-14 NOTE — DISCHARGE NOTE PROVIDER - HOSPITAL COURSE
HPI:    33 F pt with no significant PMHx brought to ED by  for nonverbal status, flat mood, drowsiness for past 3 days. History primarily obtained through . Inability to speak, mood changes, and drowsiness are abrupt changes from her baseline. Pt never had these set of symptoms. Pt is aware that she is not able to verbalize but denies having weakness, sensory changes, balancing problems, vision/hearing changes, except for BL hand numbness pt had last Thursday, which has resolved since then.  endorses pt acutely started having difficulty with short-term memory for the same duration. Denies personal or family history of psychiatric illnesses, CVA, or seizure. Denies depression, hallucinations, delusions, or history of taking neuroleptic or antipsychotic medications. Denies fever, chills, night sweats, recent travel history, sick contacts, abdominal pain, or urinary/bowel movement habit changes.        In ED, vitals are stable. EKG NSR. CT head showed BL focal hypodensities in thalami. MR angio head /wo contrast nonsignificant. UDS negative. Serum alcohol level nonsignificant. (09 Jun 2019 17:40)        Patient (pt) was diagnosed with thalamic stroke. Patient unable to speak, although understands spoken language.    -MR angio head without contrast nonsignificant.    -MR no cont head showed bilateral thalamic stroke.    -HILARIA done for concern of embolic stroke showed no cardiac thrombus but ASD with PFO    -Cardiology followed the case for possible transfer for PFO closure     -Pt started on aspirin, atorvastatin, and plavix for stroke prevention.    EEG showed:    1. Mild nonspecific diffuse or multifocal cerebral dysfunction.     2. No epileptiform abnormalities were recorded.    -Echocardiogram was nog significant.    -Neurology Dr. Cao followed the case    -Hypercoagulable workups were negative    -Follow up with Dr. Cao as an outpatient within 1 week of discharge at:    Address: [address] [facility] [address]nd floor, [place] [address]    Phone: [phone]    -Follow up with your PCP within 1 week of discharge     -Continue taking prescribed aspirin, atorvastatin, and plavix

## 2019-06-14 NOTE — EEG REPORT - NS EEG TEXT BOX
[facility]   COMPREHENSIVE EPILEPSY CENTER   REPORT OF ROUTINE VIDEO EEG     [facility]: [address] [place] [address] [address], [place] [address], Ph#: [phone]  [facility]: [id number], [place] [address], Ph#: [phone]  Office: [address] [place] [address], [facility] [address], [place] [address] Ph#: [phone]    Patient Name: PRAFUL DE LA TORRE  Age and : 33y ([birth year])  MRN #: [id number]  Location: [address]  Referring Physician: Nagi De Jesus    Study Date: 19    _____________________________________________________________  TECHNICAL INFORMATION    Placement and Labeling of Electrodes:  The EEG was performed utilizing 20 channels referential EEG connections (coronal over temporal over parasagittal montage) using all standard 10-20 electrode placements with EKG.  Recording was at a sampling rate of 256 samples per second per channel.  Time synchronized digital video recording was done simultaneously with EEG recording.  A low light infrared camera was used for low light recording.  Cirilo and seizure detection algorithms were utilized.    _____________________________________________________________  HISTORY    Patient is a 33y old  Female who presents with a chief complaint of Inability to speak (2019 09:18)      PERTINENT MEDICATION:  MEDICATIONS  (STANDING):  aspirin  chewable 81 milliGRAM(s) Oral daily  atorvastatin 40 milliGRAM(s) Oral at bedtime  clopidogrel Tablet 75 milliGRAM(s) Oral daily  dextrose 5% + sodium chloride 0.9%. 1000 milliLiter(s) (70 mL/Hr) IV Continuous <Continuous>  ergocalciferol 26305 Unit(s) Oral <User Schedule>  heparin  Injectable 5000 Unit(s) SubCutaneous every 8 hours    _____________________________________________________________  STUDY INTERPRETATION    Findings: The background was continuous, spontaneously variable and reactive. During wakefulness, the posterior dominant rhythm consisted of symmetric, well-modulated 9 Hz activity, with amplitude to 30 uV, that attenuated to eye opening.  Low amplitude frontal beta was noted in wakefulness.    Generalized Slowing:  -Intermittent Slowing, Generalized (delta and theta)    Focal Slowing:   None was present.    Sleep Background:  Drowsiness was characterized by fragmentation, attenuation, and slowing of the background activity.      Other Non-Epileptiform Findings:  None were present.    Interictal Epileptiform Activity:   None were present.    Events:  Clinical events: None recorded.  Seizures: None recorded.    Activation Procedures:   Hyperventilation was not performed.    Photic stimulation was not performed.     Artifacts:  Intermittent myogenic and movement artifacts were noted.    ECG:  The heart rate on single channel ECG was predominantly between 60-80 BPM.    _____________________________________________________________  EEG SUMMARY/CLASSIFICATION  Abnormal EEG in the awake, drowsy state  -Intermittent Slowing, Generalized (delta and theta)  _____________________________________________________________  EEG IMPRESSION/CLINICAL CORRELATE  Abnormal EEG study.  1. Mild nonspecific diffuse or multifocal cerebral dysfunction.   2. No epileptiform abnormalities were recorded.    Kush Zhao MD  EEG / Epilepsy Attending Physician [facility]   COMPREHENSIVE EPILEPSY CENTER   REPORT OF ROUTINE VIDEO EEG     [facility]: [address] [place] [address] [address], [place] [address], Ph#: [phone]  [facility]: [id number], [place] [address], Ph#: [phone]  Office: [address] [place] [address], [facility] [address], [place] [address] Ph#: [phone]    Patient Name: PRAFUL DE LA TORRE  Age and : 33y ([birth year])  MRN #: [id number]  Location: [address]  Referring Physician: Nagi De Jesus    Study Date: 19    _____________________________________________________________  TECHNICAL INFORMATION    Placement and Labeling of Electrodes:  The EEG was performed utilizing 20 channels referential EEG connections (coronal over temporal over parasagittal montage) using all standard 10-20 electrode placements with EKG.  Recording was at a sampling rate of 256 samples per second per channel.  Time synchronized digital video recording was done simultaneously with EEG recording.  A low light infrared camera was used for low light recording.  Cirilo and seizure detection algorithms were utilized.    _____________________________________________________________  HISTORY    Patient is a 33y old  Female who presents with a chief complaint of Inability to speak (2019 09:18)      PERTINENT MEDICATION:  MEDICATIONS  (STANDING):  aspirin  chewable 81 milliGRAM(s) Oral daily  atorvastatin 40 milliGRAM(s) Oral at bedtime  clopidogrel Tablet 75 milliGRAM(s) Oral daily  dextrose 5% + sodium chloride 0.9%. 1000 milliLiter(s) (70 mL/Hr) IV Continuous <Continuous>  ergocalciferol 37278 Unit(s) Oral <User Schedule>  heparin  Injectable 5000 Unit(s) SubCutaneous every 8 hours    _____________________________________________________________  STUDY INTERPRETATION    Findings: The background was continuous, spontaneously variable and reactive. During wakefulness, the posterior dominant rhythm consisted of symmetric, well-modulated 9 Hz activity, with amplitude to 30 uV, that attenuated to eye opening.  Low amplitude frontal beta was noted in wakefulness.    Generalized Slowing:  -Intermittent Slowing, Generalized (delta and theta)    Focal Slowing:   None was present.    Sleep Background:  Drowsiness was characterized by fragmentation, attenuation, and slowing of the background activity.      Other Non-Epileptiform Findings:  None were present.    Interictal Epileptiform Activity:   None were present.    Events:  Clinical events: None recorded.  Seizures: None recorded.    Activation Procedures:   Hyperventilation was not performed.    Photic stimulation was not performed.     Artifacts:  Intermittent myogenic and movement artifacts were noted.    ECG:  The heart rate on single channel ECG was predominantly between 60-80 BPM.    _____________________________________________________________  EEG SUMMARY/CLASSIFICATION  Abnormal EEG in the awake, drowsy state  -Intermittent Slowing, Generalized (delta and theta)  _____________________________________________________________  EEG IMPRESSION/CLINICAL CORRELATE  Abnormal EEG study.  1. Mild nonspecific diffuse or multifocal cerebral dysfunction.   2. No epileptiform abnormalities were recorded.    Kush Zhao MD  EEG / Epilepsy Attending Physician

## 2019-06-14 NOTE — DIETITIAN INITIAL EVALUATION ADULT. - MD RECOMMEND
po supplement/continue with Regular diet, obtain weight and diet history from patient or family when able continue with Regular diet, obtain weight and diet history from patient or family when able , spoke with covering MD/po supplement

## 2019-06-14 NOTE — DIETITIAN INITIAL EVALUATION ADULT. - PROBLEM SELECTOR PLAN 1
-Patient is not verbalizing, flat mood, drowsiness, memory problem for past 2-3 days. Denies personal or family history of psychiatric illnesses, CVA, or seizure.   -CT head focal hypodensities involving bilateral thalami may be related to underlying ischemic infarct, toxic or metabolic processes, ischemic etiologies. There is mild subtle increased attenuation of the straight sinus, which is nonspecific. Other considerations include carbon monoxide poisoning.   -MR angio head /wo contrast nonsignificant.   -Utox negative, blood alcohol level not significant   -No personal or family history of psychiatric illnesses, CVA, or seizure. Denies depression, hallucinations, delusions, or history of taking neuroleptic or antipsychotic medications.  -Neuro Dr. Bustamante consulted, recommended to start aspirin and statin  -Psych Dr. Salas consulted  -F/u MR head /w contrast  -F/u MR venogram head /wo contrast  -F/u Carotid doppler  -F/u TTE  -Neuro check Q4hrs  -Monitor on tele

## 2019-06-14 NOTE — PROGRESS NOTE ADULT - PROBLEM SELECTOR PLAN 1
-Patient able to speak, but not understanding, comprehending or cooperating   -MR angio head /wo contrast nonsignificant.   -MR no cont head: b/l thalamic stroke  -Utox negative, blood alcohol level not significant   -HILARIA done for concern of embolic stroke : showed no cardiac thrombus but ASD with PFO   -cardio f/u for possible transfer for PFO closure   -c/w asa and statin and  plavix   -speech and language eval noted   -Psych eval: no capacity to make decisions , mental status improving overall   -Pending EEG reading  -TTE : Normal EF   -Regular Neuro checks  -Hyper coagulable w/u -ve so far, f/u final results   -F/u neuro

## 2019-06-14 NOTE — PROGRESS NOTE ADULT - SUBJECTIVE AND OBJECTIVE BOX
PGY 1 Note discussed with supervising resident and primary attending    Patient is a 33y old  Female who presents with a chief complaint of Inability to speak (2019 12:36)  Remains restless depressed affect and wandering; requires to be monitored to prevent absconding; discussed with  who is concerned that she will try to leave the house and wander and try to leave for [place] with no means to do so, as she has mentioned many times.    INTERVAL HPI/OVERNIGHT EVENTS: No acute events overnight, remains afebrile; HD stable, H/H stable, WBC WNL  -Pt reports no new medical problems      MEDICATIONS  (STANDING):  aspirin  chewable 81 milliGRAM(s) Oral daily  atorvastatin 40 milliGRAM(s) Oral at bedtime  clopidogrel Tablet 75 milliGRAM(s) Oral daily  dextrose 5% + sodium chloride 0.9%. 1000 milliLiter(s) (70 mL/Hr) IV Continuous <Continuous>  ergocalciferol 17758 Unit(s) Oral <User Schedule>  heparin  Injectable 5000 Unit(s) SubCutaneous every 8 hours    MEDICATIONS  (PRN):  diphenhydrAMINE   Injectable 50 milliGRAM(s) IntraMuscular every 6 hours PRN agitation  haloperidol    Injectable 5 milliGRAM(s) IntraMuscular every 6 hours PRN Agitation      __________________________________________________  REVIEW OF SYSTEMS:    CONSTITUTIONAL: No fever,  RESPIRATORY: No cough, shortness of breath  CARDIOVASCULAR: No chest pain,  GASTROINTESTINAL: No abdominal  pain,nausea, vomiting,   NEUROLOGICAL: No headaches  SKIN: No rash       Vital Signs Last 24 Hrs  T(C): 36.7 (2019 05:31), Max: 36.8 (2019 11:28)  T(F): 98 (2019 05:31), Max: 98.2 (2019 11:28)  HR: 94 (2019 05:31) (88 - 95)  BP: 115/65 (2019 05:31) (101/64 - 115/65)  BP(mean): --  RR: 17 (2019 05:31) (17 - 18)  SpO2: 100% (2019 05:31) (94% - 100%)    ________________________________________________  PHYSICAL EXAM:    GENERAL: NAD,  HEAD:  Atraumatic,   EYES: EOMI, PERRLA,   NECK: Supple, No JVD,   CHEST/LUNG: Clear to auscultation b/l  No rales, rhonchi, wheezing,   HEART: Regular rate and rhythm; No murmurs,   ABDOMEN: Soft, Nontender, Nondistended; Bowel sounds present  NERVOUS SYSTEM:  Alert & Oriented X2,  poor judgment and insight   EXTREMITIES:   No clubbing, cyanosis, or edema  · EEG Report		  [facility] EPILEPSY [place]   REPORT OF ROUTINE VIDEO EEG     [facility]: [address] [place] [address], [address] [place] [address], Ph#: [phone]  [facility]: [id number] [place] [address], Ph#: [phone]  Office: [address] [place] [address], [address] [address] Ph#: [phone]    Patient Name: PRAFUL DE LA TORRE  Age and : 33y ([birth year])  MRN #: [id number]  Location: [address]  Referring Physician: Nagi De Jesus    Study Date: 19    _____________________________________________________________  TECHNICAL INFORMATION    Placement and Labeling of Electrodes:  The EEG was performed utilizing 20 channels referential EEG connections (coronal over temporal over parasagittal montage) using all standard 10-20 electrode placements with EKG.  Recording was at a sampling rate of 256 samples per second per channel.  Time synchronized digital video recording was done simultaneously with EEG recording.  A low light infrared camera was used for low light recording.  Cirilo and seizure detection algorithms were utilized.    _____________________________________________________________  HISTORY    Patient is a 33y old  Female who presents with a chief complaint of Inability to speak (2019 09:18)      PERTINENT MEDICATION:  MEDICATIONS  (STANDING):  aspirin  chewable 81 milliGRAM(s) Oral daily  atorvastatin 40 milliGRAM(s) Oral at bedtime  clopidogrel Tablet 75 milliGRAM(s) Oral daily  dextrose 5% + sodium chloride 0.9%. 1000 milliLiter(s) (70 mL/Hr) IV Continuous <Continuous>  ergocalciferol 65685 Unit(s) Oral <User Schedule>  heparin  Injectable 5000 Unit(s) SubCutaneous every 8 hours    _____________________________________________________________  STUDY INTERPRETATION    Findings: The background was continuous, spontaneously variable and reactive. During wakefulness, the posterior dominant rhythm consisted of symmetric, well-modulated 9 Hz activity, with amplitude to 30 uV, that attenuated to eye opening.  Low amplitude frontal beta was noted in wakefulness.    Generalized Slowing:  -Intermittent Slowing, Generalized (delta and theta)    Focal Slowing:   None was present.    Sleep Background:  Drowsiness was characterized by fragmentation, attenuation, and slowing of the background activity.      Other Non-Epileptiform Findings:  None were present.    Interictal Epileptiform Activity:   None were present.    Events:  Clinical events: None recorded.  Seizures: None recorded.    Activation Procedures:   Hyperventilation was not performed.    Photic stimulation was not performed.     Artifacts:  Intermittent myogenic and movement artifacts were noted.    ECG:  The heart rate on single channel ECG was predominantly between 60-80 BPM.    _____________________________________________________________  EEG SUMMARY/CLASSIFICATION  Abnormal EEG in the awake, drowsy state  -Intermittent Slowing, Generalized (delta and theta)  _____________________________________________________________  EEG IMPRESSION/CLINICAL CORRELATE  Abnormal EEG study.  1. Mild nonspecific diffuse or multifocal cerebral dysfunction.   2. No epileptiform abnormalities were recorded.    Kush Zhao MD  EEG / Epilepsy Attending Physician

## 2019-06-14 NOTE — DIETITIAN INITIAL EVALUATION ADULT. - FACTORS AFF FOOD INTAKE
persistent lack of appetite/seen by swallow On 6/101/9 where a Regular diet with thin liquids was recommended/difficulty swallowing

## 2019-06-14 NOTE — PROGRESS NOTE ADULT - SUBJECTIVE AND OBJECTIVE BOX
PGY 1 Note discussed with supervising resident and primary attending    Patient is a 33y old  Female who presents with a chief complaint of Inability to speak (13 Jun 2019 12:36)      INTERVAL HPI/OVERNIGHT EVENTS: No acute events overnight, remains afebrile; HD stable, H/H stable, WBC WNL  -Pt reports no new medical problems      MEDICATIONS  (STANDING):  aspirin  chewable 81 milliGRAM(s) Oral daily  atorvastatin 40 milliGRAM(s) Oral at bedtime  clopidogrel Tablet 75 milliGRAM(s) Oral daily  dextrose 5% + sodium chloride 0.9%. 1000 milliLiter(s) (70 mL/Hr) IV Continuous <Continuous>  ergocalciferol 20962 Unit(s) Oral <User Schedule>  heparin  Injectable 5000 Unit(s) SubCutaneous every 8 hours    MEDICATIONS  (PRN):  diphenhydrAMINE   Injectable 50 milliGRAM(s) IntraMuscular every 6 hours PRN agitation  haloperidol    Injectable 5 milliGRAM(s) IntraMuscular every 6 hours PRN Agitation      __________________________________________________  REVIEW OF SYSTEMS:    CONSTITUTIONAL: No fever,  RESPIRATORY: No cough, shortness of breath  CARDIOVASCULAR: No chest pain,  GASTROINTESTINAL: No abdominal  pain,nausea, vomiting,   NEUROLOGICAL: No headaches  SKIN: No rash       Vital Signs Last 24 Hrs  T(C): 36.7 (14 Jun 2019 05:31), Max: 36.8 (13 Jun 2019 11:28)  T(F): 98 (14 Jun 2019 05:31), Max: 98.2 (13 Jun 2019 11:28)  HR: 94 (14 Jun 2019 05:31) (88 - 95)  BP: 115/65 (14 Jun 2019 05:31) (101/64 - 115/65)  BP(mean): --  RR: 17 (14 Jun 2019 05:31) (17 - 18)  SpO2: 100% (14 Jun 2019 05:31) (94% - 100%)    ________________________________________________  PHYSICAL EXAM:    GENERAL: NAD,  HEAD:  Atraumatic,   EYES: EOMI, PERRLA,   NECK: Supple, No JVD,   CHEST/LUNG: Clear to auscultation b/l  No rales, rhonchi, wheezing,   HEART: Regular rate and rhythm; No murmurs,   ABDOMEN: Soft, Nontender, Nondistended; Bowel sounds present  NERVOUS SYSTEM:  Alert & Oriented X2,  poor judgment and insight   EXTREMITIES:   No clubbing, cyanosis, or edema    _________________________________________________  LABS:              CAPILLARY BLOOD GLUCOSE            RADIOLOGY & ADDITIONAL TESTS:    Imaging Personally Reviewed:  YES    Consultant(s) Notes Reviewed:   YES    Care Discussed with Consultants :     Plan of care was discussed with patient and /or primary care giver; all questions and concerns were addressed and care was aligned with patient's wishes.

## 2019-06-15 LAB
ANION GAP SERPL CALC-SCNC: 8 MMOL/L — SIGNIFICANT CHANGE UP (ref 5–17)
BUN SERPL-MCNC: 11 MG/DL — SIGNIFICANT CHANGE UP (ref 7–18)
CALCIUM SERPL-MCNC: 8.7 MG/DL — SIGNIFICANT CHANGE UP (ref 8.4–10.5)
CHLORIDE SERPL-SCNC: 107 MMOL/L — SIGNIFICANT CHANGE UP (ref 96–108)
CO2 SERPL-SCNC: 24 MMOL/L — SIGNIFICANT CHANGE UP (ref 22–31)
CREAT SERPL-MCNC: 0.61 MG/DL — SIGNIFICANT CHANGE UP (ref 0.5–1.3)
GLUCOSE SERPL-MCNC: 90 MG/DL — SIGNIFICANT CHANGE UP (ref 70–99)
HCT VFR BLD CALC: 43.2 % — SIGNIFICANT CHANGE UP (ref 34.5–45)
HGB BLD-MCNC: 14.1 G/DL — SIGNIFICANT CHANGE UP (ref 11.5–15.5)
MCHC RBC-ENTMCNC: 29.3 PG — SIGNIFICANT CHANGE UP (ref 27–34)
MCHC RBC-ENTMCNC: 32.6 GM/DL — SIGNIFICANT CHANGE UP (ref 32–36)
MCV RBC AUTO: 89.8 FL — SIGNIFICANT CHANGE UP (ref 80–100)
NRBC # BLD: 0 /100 WBCS — SIGNIFICANT CHANGE UP (ref 0–0)
PLATELET # BLD AUTO: 335 K/UL — SIGNIFICANT CHANGE UP (ref 150–400)
POTASSIUM SERPL-MCNC: 3.8 MMOL/L — SIGNIFICANT CHANGE UP (ref 3.5–5.3)
POTASSIUM SERPL-SCNC: 3.8 MMOL/L — SIGNIFICANT CHANGE UP (ref 3.5–5.3)
RBC # BLD: 4.81 M/UL — SIGNIFICANT CHANGE UP (ref 3.8–5.2)
RBC # FLD: 12.5 % — SIGNIFICANT CHANGE UP (ref 10.3–14.5)
SODIUM SERPL-SCNC: 139 MMOL/L — SIGNIFICANT CHANGE UP (ref 135–145)
WBC # BLD: 9.9 K/UL — SIGNIFICANT CHANGE UP (ref 3.8–10.5)
WBC # FLD AUTO: 9.9 K/UL — SIGNIFICANT CHANGE UP (ref 3.8–10.5)

## 2019-06-15 RX ADMIN — CLOPIDOGREL BISULFATE 75 MILLIGRAM(S): 75 TABLET, FILM COATED ORAL at 13:57

## 2019-06-15 RX ADMIN — ATORVASTATIN CALCIUM 40 MILLIGRAM(S): 80 TABLET, FILM COATED ORAL at 22:19

## 2019-06-15 RX ADMIN — HEPARIN SODIUM 5000 UNIT(S): 5000 INJECTION INTRAVENOUS; SUBCUTANEOUS at 13:57

## 2019-06-15 RX ADMIN — HEPARIN SODIUM 5000 UNIT(S): 5000 INJECTION INTRAVENOUS; SUBCUTANEOUS at 22:19

## 2019-06-15 RX ADMIN — Medication 81 MILLIGRAM(S): at 13:57

## 2019-06-15 RX ADMIN — SODIUM CHLORIDE 70 MILLILITER(S): 9 INJECTION, SOLUTION INTRAVENOUS at 22:36

## 2019-06-15 NOTE — PROGRESS NOTE ADULT - SUBJECTIVE AND OBJECTIVE BOX
PGY 1 Note discussed with supervising resident and primary attending    Patient is a 33y old  Female who presents with a chief complaint of Inability to speak (14 Jun 2019 16:25)      INTERVAL HPI/OVERNIGHT EVENTS: No acute events overnight, remains afebrile; HD stable, H/H stable, WBC WNL  -Pt reports no new medical problems  -EEG showed:  1. Mild nonspecific diffuse or multifocal cerebral dysfunction.   2. No epileptiform abnormalities were recorded.    MEDICATIONS  (STANDING):  aspirin  chewable 81 milliGRAM(s) Oral daily  atorvastatin 40 milliGRAM(s) Oral at bedtime  clopidogrel Tablet 75 milliGRAM(s) Oral daily  dextrose 5% + sodium chloride 0.9%. 1000 milliLiter(s) (70 mL/Hr) IV Continuous <Continuous>  ergocalciferol 19749 Unit(s) Oral <User Schedule>  heparin  Injectable 5000 Unit(s) SubCutaneous every 8 hours    MEDICATIONS  (PRN):  diphenhydrAMINE   Injectable 50 milliGRAM(s) IntraMuscular every 6 hours PRN agitation  haloperidol    Injectable 5 milliGRAM(s) IntraMuscular every 6 hours PRN Agitation      __________________________________________________  REVIEW OF SYSTEMS:    CONSTITUTIONAL: No fever,  RESPIRATORY: No cough, shortness of breath  CARDIOVASCULAR: No chest pain,  GASTROINTESTINAL: No abdominal  pain,nausea, vomiting,   NEUROLOGICAL: No headaches  SKIN: No rash       Vital Signs Last 24 Hrs  T(C): 36.2 (15 John 2019 05:17), Max: 37.1 (14 Jun 2019 20:31)  T(F): 97.2 (15 John 2019 05:17), Max: 98.7 (14 Jun 2019 20:31)  HR: 82 (15 John 2019 05:17) (82 - 99)  BP: 125/73 (15 John 2019 05:17) (106/69 - 134/99)  BP(mean): --  RR: 17 (15 John 2019 05:17) (17 - 18)  SpO2: 100% (15 John 2019 05:17) (100% - 100%)    ________________________________________________  PHYSICAL EXAM:    GENERAL: NAD,  HEAD:  Atraumatic,   EYES: EOMI, PERRLA,   NECK: Supple, No JVD,   CHEST/LUNG: Clear to auscultation b/l  No rales, rhonchi, wheezing,   HEART: Regular rate and rhythm; No murmurs,   ABDOMEN: Soft, Nontender, Nondistended; Bowel sounds present  NERVOUS SYSTEM:  Alert & Oriented X2,  poor judgment and insight   EXTREMITIES:   No clubbing, cyanosis, or edema    _________________________________________________  LABS:                        14.1   9.90  )-----------( 335      ( 15 John 2019 05:46 )             43.2     06-15    139  |  107  |  11  ----------------------------<  90  3.8   |  24  |  0.61    Ca    8.7      15 John 2019 05:46          CAPILLARY BLOOD GLUCOSE            RADIOLOGY & ADDITIONAL TESTS:    Imaging Personally Reviewed:  YES    Consultant(s) Notes Reviewed:   YES    Care Discussed with Consultants :     Plan of care was discussed with patient and /or primary care giver; all questions and concerns were addressed and care was aligned with patient's wishes.

## 2019-06-15 NOTE — PROGRESS NOTE ADULT - PROBLEM SELECTOR PLAN 1
-Patient able to speak, but not understanding, comprehending or cooperating   -MR angio head /wo contrast nonsignificant.   -MR no cont head: b/l thalamic stroke  -Utox negative, blood alcohol level not significant   -HILARIA done for concern of embolic stroke : showed no cardiac thrombus but ASD with PFO   -cardio f/u for possible transfer for PFO closure   -c/w asa and statin and  plavix   -speech and language eval noted   -Psych eval: no capacity to make decisions , mental status improving overall   -TTE: Normal EF  -Regular Neuro checks  -Hyper coagulable w/u -ve so far, f/u final results  -F/u Neuro  -EEG showed:  1. Mild nonspecific diffuse or multifocal cerebral dysfunction.   2. No epileptiform abnormalities were recorded.

## 2019-06-16 LAB
ANION GAP SERPL CALC-SCNC: 9 MMOL/L — SIGNIFICANT CHANGE UP (ref 5–17)
BUN SERPL-MCNC: 12 MG/DL — SIGNIFICANT CHANGE UP (ref 7–18)
CALCIUM SERPL-MCNC: 9.3 MG/DL — SIGNIFICANT CHANGE UP (ref 8.4–10.5)
CHLORIDE SERPL-SCNC: 108 MMOL/L — SIGNIFICANT CHANGE UP (ref 96–108)
CO2 SERPL-SCNC: 23 MMOL/L — SIGNIFICANT CHANGE UP (ref 22–31)
CREAT SERPL-MCNC: 0.69 MG/DL — SIGNIFICANT CHANGE UP (ref 0.5–1.3)
GLUCOSE SERPL-MCNC: 101 MG/DL — HIGH (ref 70–99)
HCT VFR BLD CALC: 45.4 % — HIGH (ref 34.5–45)
HGB BLD-MCNC: 14.7 G/DL — SIGNIFICANT CHANGE UP (ref 11.5–15.5)
MCHC RBC-ENTMCNC: 29.1 PG — SIGNIFICANT CHANGE UP (ref 27–34)
MCHC RBC-ENTMCNC: 32.4 GM/DL — SIGNIFICANT CHANGE UP (ref 32–36)
MCV RBC AUTO: 89.7 FL — SIGNIFICANT CHANGE UP (ref 80–100)
NRBC # BLD: 0 /100 WBCS — SIGNIFICANT CHANGE UP (ref 0–0)
PLATELET # BLD AUTO: 348 K/UL — SIGNIFICANT CHANGE UP (ref 150–400)
POTASSIUM SERPL-MCNC: 3.8 MMOL/L — SIGNIFICANT CHANGE UP (ref 3.5–5.3)
POTASSIUM SERPL-SCNC: 3.8 MMOL/L — SIGNIFICANT CHANGE UP (ref 3.5–5.3)
PROT S FREE PPP-ACNC: 95 % NORMAL — SIGNIFICANT CHANGE UP (ref 60–140)
RBC # BLD: 5.06 M/UL — SIGNIFICANT CHANGE UP (ref 3.8–5.2)
RBC # FLD: 12.7 % — SIGNIFICANT CHANGE UP (ref 10.3–14.5)
SODIUM SERPL-SCNC: 140 MMOL/L — SIGNIFICANT CHANGE UP (ref 135–145)
WBC # BLD: 9.34 K/UL — SIGNIFICANT CHANGE UP (ref 3.8–10.5)
WBC # FLD AUTO: 9.34 K/UL — SIGNIFICANT CHANGE UP (ref 3.8–10.5)

## 2019-06-16 RX ADMIN — HEPARIN SODIUM 5000 UNIT(S): 5000 INJECTION INTRAVENOUS; SUBCUTANEOUS at 06:15

## 2019-06-16 RX ADMIN — Medication 81 MILLIGRAM(S): at 13:07

## 2019-06-16 RX ADMIN — ATORVASTATIN CALCIUM 40 MILLIGRAM(S): 80 TABLET, FILM COATED ORAL at 21:16

## 2019-06-16 RX ADMIN — CLOPIDOGREL BISULFATE 75 MILLIGRAM(S): 75 TABLET, FILM COATED ORAL at 13:07

## 2019-06-16 RX ADMIN — HEPARIN SODIUM 5000 UNIT(S): 5000 INJECTION INTRAVENOUS; SUBCUTANEOUS at 21:17

## 2019-06-16 RX ADMIN — HEPARIN SODIUM 5000 UNIT(S): 5000 INJECTION INTRAVENOUS; SUBCUTANEOUS at 13:07

## 2019-06-17 DIAGNOSIS — F33.2 MAJOR DEPRESSIVE DISORDER, RECURRENT SEVERE WITHOUT PSYCHOTIC FEATURES: ICD-10-CM

## 2019-06-17 PROCEDURE — 99233 SBSQ HOSP IP/OBS HIGH 50: CPT

## 2019-06-17 RX ADMIN — Medication 50 MILLIGRAM(S): at 09:40

## 2019-06-17 RX ADMIN — HALOPERIDOL DECANOATE 5 MILLIGRAM(S): 100 INJECTION INTRAMUSCULAR at 09:40

## 2019-06-17 NOTE — PROGRESS NOTE ADULT - PROBLEM SELECTOR PLAN 1
-Patient able to speak, but not understanding, comprehending or cooperating   -MR angio head /wo contrast nonsignificant.   -MR no cont head: b/l thalamic stroke  -Utox negative, blood alcohol level not significant   -HILARIA done for concern of embolic stroke : showed no cardiac thrombus but ASD with PFO   -cardio f/u for possible transfer for PFO closure   -c/w asa and statin and  plavix   -speech and language eval noted   -Psych eval: no capacity to make decisions , mental status improving overall   -TTE: Normal EF  -Regular Neuro checks  -Hyper coagulable w/u -ve so far, f/u final results  -F/u Neuro  -EEG showed:  1. Mild nonspecific diffuse or multifocal cerebral dysfunction.   2. No epileptiform abnormalities were recorded. -Patient able to speak, but not understanding, comprehending or cooperating   -MR angio head /wo contrast nonsignificant.   -MR no cont head: b/l thalamic stroke  -HILARIA showed ASD with PFO   - PFO closure in 4 weeks  -c/w asa and statin and  plavix   -Psych eval: no capacity to make decisions , mental status improving overall   -Regular Neuro checks  -Hyper coagulable w/u -ve so far, f/u final results  -EEG showed: Mild nonspecific diffuse or multifocal cerebral dysfunction. No epileptiform abnormalities  pt is agitated asking to leave AMA   Psych Dr. Banegas consulted for 2 PC transfer

## 2019-06-17 NOTE — PROGRESS NOTE ADULT - SUBJECTIVE AND OBJECTIVE BOX
PGY1 Note discussed with Supervising Resident and Primary Attending.    Patient is a 33y old  Female who presents with a chief complaint of Inability to speak (15 John 2019 10:52)      INTERVAL HPI/OVERNIGHT EVENTS :    ***********************************************************************************************************    MEDICATIONS  (STANDING):  aspirin  chewable 81 milliGRAM(s) Oral daily  atorvastatin 40 milliGRAM(s) Oral at bedtime  clopidogrel Tablet 75 milliGRAM(s) Oral daily  dextrose 5% + sodium chloride 0.9%. 1000 milliLiter(s) (70 mL/Hr) IV Continuous <Continuous>  ergocalciferol 92262 Unit(s) Oral <User Schedule>  heparin  Injectable 5000 Unit(s) SubCutaneous every 8 hours    MEDICATIONS  (PRN):  diphenhydrAMINE   Injectable 50 milliGRAM(s) IntraMuscular every 6 hours PRN agitation  haloperidol    Injectable 5 milliGRAM(s) IntraMuscular every 6 hours PRN Agitation      ***********************************************************************************************************    Allergies    penicillin (Rash)    Intolerances        ***********************************************************************************************************    REVIEW OF SYSTEMS :  * CONSTITUTIONAL      : No Fever, Weight loss, or Fatigue  * EYES                             : No eye pain , Visual disturbances or Discharge  * RESPIRATORY             : No Cough, Wheezing, Chills or Hemoptysis; No shortness of breath  * CARDIOVASCULAR     : No Chest pain, Palpitations, Dizziness, or Leg swelling  * GASTROINTESTINAL  : No Abdominal or Epigastric pain. No Nausea, Vomiting or Hematemesis; No Diarrhea or Constipation. No Melena or Hematochezia.  * GENITOURINARY        : No Dysuria , Frequency , Haematuria   * NEUROLOGICAL          : No Headaches, Memory loss, Loss of trength, Numbness, or Tremors  * MUSCULOSKELETAL   : No Joint pain  * PSYCHIATRY                 : No Depression or Anxiety   * HEME/LYMPH              : No Easy Bruising or Bleeding gums  * SKIN                               : No Itching, Burning, Rashes, or Lesions     ***********************************************************************************************************    Vital Signs Last 24 Hrs  T(C): 36.4 (17 Jun 2019 05:30), Max: 36.6 (16 Jun 2019 13:24)  T(F): 97.5 (17 Jun 2019 05:30), Max: 97.9 (16 Jun 2019 13:24)  HR: 95 (17 Jun 2019 05:30) (88 - 95)  BP: 110/67 (17 Jun 2019 05:30) (90/75 - 110/67)  BP(mean): --  RR: 17 (17 Jun 2019 05:30) (14 - 17)  SpO2: 97% (17 Jun 2019 05:30) (97% - 99%)    ***********************************************************************************************************    PHYSICAL EXAM :  * GENERAL                 : NAD, Well-groomed, Well-developed  * HEAD                       :  Atraumatic, Normocephalic  * EYES                         : EOMI, PERRLA, Conjunctiva and Sclera clear  * ENT                           : Moist Mucous Membranes  * NECK                         : Supple, No JVD, Normal Thyroid  * CHEST/LUNG           : Clear to Auscultation bilaterally; No Rales, Rhonchi, Wheezing or Rubs  * HEART                       : Regular Rate and Rhythm; No murmurs, Rubs or gallops  * ABDOMEN                : Soft, Non-tender, Non-distended; Bowel Sounds present  * NERVOUS SYSTEM  :  Alert & Oriented X3, Good Concentration; Motor Strength 5/5 B/L UL LL ; DTRs 2+ Intact and Symmetric  * EXTREMITIES            :  2+ Peripheral Pulses, No clubbing, cyanosis, or edema  * SKIN                           : No Rashes or Lesions    **********************************************************************************************************  LABS:                          14.7   9.34  )-----------( 348      ( 16 Jun 2019 06:14 )             45.4     06-16    140  |  108  |  12  ----------------------------<  101<H>  3.8   |  23  |  0.69    Ca    9.3      16 Jun 2019 06:14          CAPILLARY BLOOD GLUCOSE          **********************************************************************************************************    RADIOLOGY & ADDITIONAL TESTS:   No radiological imaging was required    Imaging Personally Reviewed   :  [ ] YES  [ ] NO    Consultant(s) Notes Reviewed :  [ ] YES  [ ] NO PGY1 Note discussed with Supervising Resident and Primary Attending.    Patient is a 33y old  Female who presents with a chief complaint of Inability to speak (15 John 2019 10:52)      INTERVAL HPI/OVERNIGHT EVENTS :  No acute events overnight,  pt is agitated asking to leave AMA   Psych consulted for 2 PC transfer     ***********************************************************************************************************    MEDICATIONS  (STANDING):  aspirin  chewable 81 milliGRAM(s) Oral daily  atorvastatin 40 milliGRAM(s) Oral at bedtime  clopidogrel Tablet 75 milliGRAM(s) Oral daily  dextrose 5% + sodium chloride 0.9%. 1000 milliLiter(s) (70 mL/Hr) IV Continuous <Continuous>  ergocalciferol 01389 Unit(s) Oral <User Schedule>  heparin  Injectable 5000 Unit(s) SubCutaneous every 8 hours    MEDICATIONS  (PRN):  diphenhydrAMINE   Injectable 50 milliGRAM(s) IntraMuscular every 6 hours PRN agitation  haloperidol    Injectable 5 milliGRAM(s) IntraMuscular every 6 hours PRN Agitation      ***********************************************************************************************************    Allergies    penicillin (Rash)    Intolerances        ***********************************************************************************************************    REVIEW OF SYSTEMS :  * CONSTITUTIONAL      : No Fever, Weight loss, or Fatigue  * EYES                             : No eye pain , Visual disturbances or Discharge  * RESPIRATORY             : No Cough, Wheezing, Chills or Hemoptysis; No shortness of breath  * CARDIOVASCULAR     : No Chest pain, Palpitations, Dizziness, or Leg swelling  * GASTROINTESTINAL  : No Abdominal or Epigastric pain. No Nausea, Vomiting or Hematemesis; No Diarrhea or Constipation. No Melena or Hematochezia.  * GENITOURINARY        : No Dysuria , Frequency , Haematuria   * NEUROLOGICAL          : No Headaches, Memory loss, Loss of trength, Numbness, or Tremors  * MUSCULOSKELETAL   : No Joint pain  * PSYCHIATRY                 : No Depression or Anxiety   * HEME/LYMPH              : No Easy Bruising or Bleeding gums  * SKIN                               : No Itching, Burning, Rashes, or Lesions     ***********************************************************************************************************    Vital Signs Last 24 Hrs  T(C): 36.4 (17 Jun 2019 05:30), Max: 36.6 (16 Jun 2019 13:24)  T(F): 97.5 (17 Jun 2019 05:30), Max: 97.9 (16 Jun 2019 13:24)  HR: 95 (17 Jun 2019 05:30) (88 - 95)  BP: 110/67 (17 Jun 2019 05:30) (90/75 - 110/67)  BP(mean): --  RR: 17 (17 Jun 2019 05:30) (14 - 17)  SpO2: 97% (17 Jun 2019 05:30) (97% - 99%)    ***********************************************************************************************************    PHYSICAL EXAM :  * GENERAL                 : NAD, Well-groomed, Well-developed  * HEAD                       :  Atraumatic, Normocephalic  * EYES                         : EOMI, PERRLA, Conjunctiva and Sclera clear  * ENT                           : Moist Mucous Membranes  * NECK                         : Supple, No JVD, Normal Thyroid  * CHEST/LUNG           : Clear to Auscultation bilaterally; No Rales, Rhonchi, Wheezing or Rubs  * HEART                       : Regular Rate and Rhythm; No murmurs, Rubs or gallops  * ABDOMEN                : Soft, Non-tender, Non-distended; Bowel Sounds present  * NERVOUS SYSTEM  :  Alert & Oriented X3, Good Concentration; Motor Strength 5/5 B/L UL LL ; DTRs 2+ Intact and Symmetric  * EXTREMITIES            :  2+ Peripheral Pulses, No clubbing, cyanosis, or edema  * SKIN                           : No Rashes or Lesions    **********************************************************************************************************  LABS:                          14.7   9.34  )-----------( 348      ( 16 Jun 2019 06:14 )             45.4     06-16    140  |  108  |  12  ----------------------------<  101<H>  3.8   |  23  |  0.69    Ca    9.3      16 Jun 2019 06:14          CAPILLARY BLOOD GLUCOSE          **********************************************************************************************************    RADIOLOGY & ADDITIONAL TESTS:   No radiological imaging was required    Imaging Personally Reviewed   :  [ ] YES  [ ] NO    Consultant(s) Notes Reviewed :  [ ] YES  [ ] NO

## 2019-06-17 NOTE — PROGRESS NOTE BEHAVIORAL HEALTH - NSBHFUPINTERVALCCFT_PSY_A_CORE
33  [facility] female with no prior psych history and no significant PMHx brought to ED by  for nonverbal status, flat mood, drowsiness for past 3 days.
33  [facility] and no significant PMHx brought to ED by  for nonverbal status, flat mood, drowsiness for past 3 days significant AMS dx'd with stroke consulted for capacity assessment.
Received Haldol 5mg X2 on 6/13 and x1 on 6/17

## 2019-06-17 NOTE — PROGRESS NOTE ADULT - ASSESSMENT
Bilateral thalamic infarcts; acute psychosis improving after the outburst on Thursday. But not yet well controlled. Discussed over the phone with PCP Dr Minh Gomez, who agrees with acute care. I spoke with Rohini at length discussing why she has to stay in the hospital and that refusa of medications and non-compliance are the reasons she is in this situation. Appeared to understand that compliance with medications and avoiding dysphoric thoughts might help towards discharge. > 60 minutes direct patient care

## 2019-06-17 NOTE — PROGRESS NOTE BEHAVIORAL HEALTH - PRIMARY DX
Encephalopathy, unspecified
Encephalopathy, unspecified
MDD (major depressive disorder), recurrent episode, severe

## 2019-06-17 NOTE — PROGRESS NOTE BEHAVIORAL HEALTH - ORIENTATION OTHER
3/30/18 "[facility]"
confused on year, 2019, 2021
3/30/18 "[facility]"

## 2019-06-17 NOTE — PROGRESS NOTE BEHAVIORAL HEALTH - RISK ASSESSMENT
Risk factors: Acute medical issues, h/o SA, h/o depression, depression in the context of multiple psychosocial stressors, has 8 months old baby at home, daughter is in [place].  Protective factors; Supportive family.    Pt. will need inpatient admission for further stabilization.
elevated given h/o prior SA in 2010 however no evidence of suicidality  full risk assessment could not be carried out.
elevated given h/o prior SA in 2010 however no evidence of suicidality  full risk assessment could not be carried out.

## 2019-06-17 NOTE — PROGRESS NOTE BEHAVIORAL HEALTH - SUMMARY
33  [place] female with no prior psych history and no significant PMHx brought to ED by  for nonverbal status, flat mood, drowsiness for past 3 days dx'd with stroke. Psychiatry consulted for agitation and possible need for psychiatric admission.    Patient seen and evaluated, AAOX2-3 (confused on the year 2019, 2021), knows that she is admitted for stroke, overall cooperative but guarded. Stated that she came to the hospital due to stroke. When asked about her mood, she stated that her mood is very bad. Stated that she is very upset. Stated that her one daughter who is 10 years old is in [facility] and she can only see her in pictures. Did not elaborate that why she cannot see her. Patient stated that she has been feeling down and tired. When asked about SI, patient was evasive and quite. Reported past SA vis overdosing 10 years ago in [facility] in the context of depression. Reported feeling hopeless at times. Denies HI, denies AH and VH.    Spoke with pt.'s  over the phone Mr. Abena Albarado Julieta #[phone]: He reported that patient has been depressed off and on since 2015, recently she has been more depressed, overwhelmed, angry, frustrated due to her job and due to the fact that one of her 10 years old daughter in in [facility] with pt.'s mother. he furthers stated that patient is not taking care of herself or the baby due to her depression. He stated that pt. is hiding her sxs.  stated multiple times that he is concerned about his wife.  He also stated that even before stroke patient was depressed, not taking care herself. Patient has one SA in 2009 via overdosing, requiring medical admission. Discussed about need for inpatient psychiatric admission,  agreed with the plan.    PLAN:  Patient has been depressed in the context of multiple psychosocial stressors, not functioning at home, not able to care for herself, has h/o one SA via overdosing, given worsening depressive sxs, past SA and concerning collateral, pt. is high risk of self harm and will need inpatient psychiatric admission for further stabilization, safety and medication management.   -Continue Haldol PRNs as written, monitor EKG for qtc.  -Will be transferred to psychiatric inpatient unit under [facility] status once medically optimized  -Case discussed with SW, primary team and pt.'s . 33  [place] female with no prior psych history and no significant PMHx brought to ED by  for nonverbal status, flat mood, drowsiness for past 3 days dx'd with stroke. Psychiatry consulted for agitation and possible need for psychiatric admission.    Patient seen and evaluated, AAOX2-3 (confused on the year 2019, 2021), knows that she is admitted for stroke, overall cooperative but guarded. Stated that she came to the hospital due to stroke. When asked about her mood, she stated that her mood is very bad. Stated that she is very upset. Stated that her one daughter who is 10 years old is in [facility] and she can only see her in pictures. Did not elaborate that why she cannot see her. Patient stated that she has been feeling down and tired. When asked about SI, patient was evasive and quite. Reported past SA vis overdosing 10 years ago in [facility] in the context of depression. Reported feeling hopeless at times. Denies HI, denies AH and VH.    Spoke with pt.'s  over the phone Mr. Abena Albarado Julieta #[phone]: He reported that patient has been depressed off and on since 2015, recently she has been more depressed, overwhelmed, angry, frustrated due to her job and due to the fact that one of her 10 years old daughter in in [facility] with pt.'s mother. he furthers stated that patient is not taking care of herself or the baby due to her depression. He stated that pt. is hiding her sxs.  stated multiple times that he is concerned about his wife.  He also stated that even before stroke patient was depressed, not taking care herself. Patient has one SA in 2009 via overdosing, requiring medical admission. Discussed about need for inpatient psychiatric admission,  agreed with the plan.    PLAN:  Patient has been depressed in the context of multiple psychosocial stressors, not functioning at home, not able to care for herself, has h/o one SA via overdosing, given worsening depressive sxs, past SA and concerning collateral, pt. is high risk of self harm and will need inpatient psychiatric admission for further stabilization, safety and medication management.   -Continue Haldol PRNs as written, monitor EKG for qtc.  -Will be transferred to psychiatric inpatient unit under [facility] status once medically optimized. 2 PC completed in the chart, needs 2nd signature.  -Case discussed with SW, primary team and pt.'s . 33  [place] female with no prior psych history and no significant PMHx brought to ED by  for nonverbal status, flat mood, drowsiness for past 3 days dx'd with stroke. Psychiatry consulted for agitation and possible need for psychiatric admission.    Patient seen and evaluated, AAOX2-3 (confused on the year 2019, 2021), knows that she is admitted for stroke, overall cooperative but guarded. Stated that she came to the hospital due to stroke. When asked about her mood, she stated that her mood is very bad. Stated that she is very upset. Stated that her one daughter who is 10 years old is in [facility] and she can only see her in pictures. Did not elaborate that why she cannot see her. Patient stated that she has been feeling down and tired. When asked about SI, patient was evasive and quite. Reported past SA vis overdosing 10 years ago in [facility] in the context of depression. Reported feeling hopeless at times. Denies HI, denies AH and VH.    Spoke with pt.'s  over the phone Mr. Abena Albarado Julieta #[phone]: He reported that patient has been depressed off and on since 2015, recently she has been more depressed, overwhelmed, angry, frustrated due to her job and due to the fact that one of her 10 years old daughter in in [facility] with pt.'s mother. he furthers stated that patient is not taking care of herself or the baby due to her depression. He stated that pt. is hiding her sxs.  stated multiple times that he is concerned about his wife.  He also stated that even before stroke patient was depressed, not taking care herself. Patient has one SA in 2009 via overdosing, requiring medical admission. Discussed about need for inpatient psychiatric admission,  agreed with the plan.    PLAN:  Patient has been depressed in the context of multiple psychosocial stressors, not functioning at home, not able to care for herself, has h/o one SA via overdosing, given worsening depressive sxs, past SA and concerning collateral, pt. is high risk of self harm and will need inpatient psychiatric admission for further stabilization, safety and medication management.   -Continue Haldol PRNs as written, monitor EKG for qtc.  -Will be transferred to psychiatric inpatient unit under [facility] status once medically optimized. 2 PC completed in the chart, needs 2nd signature.  -Case discussed with SW, primary team and pt.'s .    Case discussed with Dr. De Jesus. 33  Samo[place] female with no prior psych history and no significant PMHx brought to ED by  for nonverbal status, flat mood, drowsiness for past 3 days dx'd with stroke. Psychiatry consulted for agitation and possible need for psychiatric admission.    Patient seen and evaluated, AAOX2-3 (confused on the year 2019, 2021), knows that she is admitted for stroke, overall cooperative but guarded. Stated that she came to the hospital due to stroke. When asked about her mood, she stated that her mood is very bad. Stated that she is very upset. Stated that her one daughter who is 10 years old is in [facility] and she can only see her in pictures. Did not elaborate that why she cannot see her. Patient stated that she has been feeling down and tired. When asked about SI, patient was evasive and quite. Reported past SA vis overdosing 10 years ago in [facility] in the context of depression. Reported feeling hopeless at times. Denies HI, denies AH and VH.    Spoke with pt.'s  over the phone Mr. Abena Albarado Julieta #[phone]: He reported that patient has been depressed off and on since 2015, recently she has been more depressed, overwhelmed, angry, frustrated due to her job and due to the fact that one of her 10 years old daughter in in [facility] with pt.'s mother. he furthers stated that patient is not taking care of herself or the baby due to her depression. He stated that pt. is hiding her sxs.  stated multiple times that he is concerned about his wife.  He also stated that even before stroke patient was depressed, not taking care herself. Patient has one SA in 2009 via overdosing, requiring medical admission. Discussed about need for inpatient psychiatric admission,  agreed with the plan.    PLAN:  Patient has been depressed in the context of multiple psychosocial stressors, not functioning at home, not able to care for herself, has h/o one SA via overdosing, currently pt. is evasive, guarded and minimizing her sxs, given worsening depressive sxs, past SA and concerning collateral, pt. is high risk for self harm and will need inpatient psychiatric admission for further stabilization, safety and medication management.   -Continue Haldol PRNs as written, monitor EKG for qtc.  -Will be transferred to psychiatric inpatient unit under [facility]pc status once medically optimized. 2 PC completed in the chart, needs 2nd signature.  -Case discussed with SW, primary team and pt.'s .    Case discussed with Dr. De Jesus.

## 2019-06-17 NOTE — PROGRESS NOTE BEHAVIORAL HEALTH - NSBHCHARTREVIEWVS_PSY_A_CORE FT
ICU Vital Signs Last 24 Hrs  T(C): 36.4 (17 Jun 2019 05:30), Max: 36.6 (16 Jun 2019 13:24)  T(F): 97.5 (17 Jun 2019 05:30), Max: 97.9 (16 Jun 2019 13:24)  HR: 95 (17 Jun 2019 05:30) (88 - 95)  BP: 110/67 (17 Jun 2019 05:30) (90/75 - 110/67)  BP(mean): --  ABP: --  ABP(mean): --  RR: 17 (17 Jun 2019 05:30) (14 - 17)  SpO2: 97% (17 Jun 2019 05:30) (97% - 99%)

## 2019-06-17 NOTE — PROGRESS NOTE BEHAVIORAL HEALTH - NSBHFUPINTERVALHXFT_PSY_A_CORE
Patient seen and evaluated, AAOX2-3 (confused on the year 2019, 2021), overall cooperative but somewhat guarded. Stated that she came to the hospital due to stroke. When asked about her mood, she stated that her mood is very bad. Stated that she is very upset. Stated that her one daughter who is 10 years old is in [facility] and she can only see her in pictures. Did not elaborate that whey she cannot see her. Patient stated that she has been feeling down and tired. When asked about SI, patient was evasive, however she reported some passive suicidal thoughts. Reported past SA vis overdosing 10 years ago in [facility] in the context of depression. Reported feeling hopeless at times. Patient seen and evaluated, AAOX2-3 (confused on the year 2019, 2021), knows that she is admitted for stroke, overall cooperative but somewhat guarded. Stated that she came to the hospital due to stroke. When asked about her mood, she stated that her mood is very bad. Stated that she is very upset. Stated that her one daughter who is 10 years old is in [place] and she can only see her in pictures. Did not elaborate that why she cannot see her. Patient stated that she has been feeling down and tired. When asked about SI, patient was evasive and quite. Reported past SA vis overdosing 10 years ago in [facility] in the context of depression. Reported feeling hopeless at times. Denies HI, denies AH and VH.    Spoke with pt.'s  over the phone Mr. Abena Albarado Julieta #[phone]: He reported that patient has been depressed off and on since 2015, recently she has been more depressed, overwhelmed, angry, frustrated due to her job and due to the fact that one of her 10 years old daughter is in [place] with pt.'s mother. He further stated that patient is not taking care of herself or the baby due to her depression. He stated that pt. is hiding her sxs.  stated multiple times that he is concerned about his wife.  He also stated that even before stroke patient was depressed, not taking care herself. Patient has one SA in 2009 via overdosing, requiring medical admission. Discussed about need for inpatient psychiatric admission,  agreed with the plan. Patient seen and evaluated, AAOX2-3 (confused on the year 2019, 2021), knows that she is admitted for stroke, overall cooperative but somewhat guarded. Stated that she came to the hospital due to stroke. When asked about her mood, she stated that her mood is very bad. Stated that she is very upset. Stated that her one daughter who is 10 years old is in [facility] and she can only see her in pictures. Did not elaborate that why she cannot see her. Patient stated that she has been feeling down and tired. When asked about SI, patient was evasive and quite. Reported past SA vis overdosing 10 years ago in [facility] in the context of depression. Reported feeling hopeless at times. Denies HI, denies AH and VH.    Spoke with pt.'s  over the phone Mr. Abena Cabreracarlinegeorgi #[phone]: He reported that patient has been depressed off and on since 2015, recently she has been more depressed, overwhelmed, angry, frustrated due to her job and due to the fact that one of her 10 years old daughter is in [place] with pt.'s mother. He further stated that patient is not taking care of herself or the baby due to her depression. He stated that pt. is hiding her sxs.  stated multiple times that he is concerned about his wife.  He also stated that even before stroke patient was depressed, not taking care herself. Patient has one SA in 2009 via overdosing, requiring medical admission. Discussed about need for inpatient psychiatric admission,  agreed with the plan.    As per team: Pt. has been agitated, trying to elope, requiring PRNs.  as per SW :  also reported that pt. is not showering at home and rejecting her baby.

## 2019-06-17 NOTE — PROGRESS NOTE ADULT - SUBJECTIVE AND OBJECTIVE BOX
INTERVAL HPI/OVERNIGHT EVENTS: Significant dysphoria, crying demanding to leave and refusing all medications. Requiring 24x7 one-on one nursing care.    HEALTH ISSUES - PROBLEM Dx:  MDD (major depressive disorder), recurrent episode, severe  Thalamic stroke: Thalamic stroke  Vitamin D deficiency: Vitamin D deficiency  Encephalopathy, unspecified: Encephalopathy, unspecified  Aphasia: Aphasia  Need for prophylactic measure: Need for prophylactic measure  Mutism: Mutism          MEDICATIONS  (STANDING):  aspirin  chewable 81 milliGRAM(s) Oral daily  atorvastatin 40 milliGRAM(s) Oral at bedtime  clopidogrel Tablet 75 milliGRAM(s) Oral daily  dextrose 5% + sodium chloride 0.9%. 1000 milliLiter(s) (70 mL/Hr) IV Continuous <Continuous>  ergocalciferol 89634 Unit(s) Oral <User Schedule>  heparin  Injectable 5000 Unit(s) SubCutaneous every 8 hours    MEDICATIONS  (PRN):  diphenhydrAMINE   Injectable 50 milliGRAM(s) IntraMuscular every 6 hours PRN agitation  haloperidol    Injectable 5 milliGRAM(s) IntraMuscular every 6 hours PRN Agitation      Allergies    penicillin (Rash)    Intolerances        REVIEW OF SYSTEMS    Constitutional: No generalized weakness. No fevers or chills.                    Eyes, Ears, Mouth, Throat: No vision loss   Respiratory: No shortness of breath or cough.                                Cardiovascular: No chest pain or palpitations  Gastrointestinal: No nausea or vomiting.                                         Genitourinary: No urinary incontinence or burning on urination.  Musculoskeletal: No joint pain.                                                           Dermatologic: No rash.  Neurological: as per HPI                                                                      Psychiatric: No behavioral problems.  Endocrine: No known hypoglycemia.               Hematologic/Lymphatic: No easy bleeding.      Vital Signs Last 24 Hrs  T(C): 36.6 (17 Jun 2019 15:07), Max: 36.6 (17 Jun 2019 15:07)  T(F): 97.8 (17 Jun 2019 15:07), Max: 97.8 (17 Jun 2019 15:07)  HR: 66 (17 Jun 2019 15:07) (66 - 95)  BP: 113/86 (17 Jun 2019 15:07) (90/75 - 113/86)  BP(mean): --  RR: 16 (17 Jun 2019 15:07) (16 - 17)  SpO2: 96% (17 Jun 2019 15:07) (96% - 99%)    PHYSICAL EXAM:    General Exam:   General appearance: No acute distress                 Cardiovascular: Pedal dorsalis pulses intact bilaterally    Mental Status: Orientated to self, and place, not date.  Lethargic.  No dysarthria, aphasia or neglect.  Knowledge intact.  Registration intact.  Short and long term memory grossly intact.      Cranial Nerves: CN I - not tested.  PERRL, EOMI, VFF, no nystagmus or diplopia.  No APD.  Fundi not visualized.  CN V1-3 intact to light touch and pinprick.  Right eye closure while left eye was blinking.  Hearing intact to finger rub bilaterally.  Tongue, uvula and palate midline.  Sternocleidomastoid and Trapezius intact bilaterally.    Motor:   Tone: Normal and normal bulk                  Strength intact 5/5 throughout  No pronator drift bilaterally                      No dysmetria on finger-nose-finger or heel-shin-heel  No truncal ataxia.  No resting, postural or action tremor.  No myoclonus.    Sensation: intact to light touch, pinprick, vibration and proprioception    Deep Tendon Reflexes: Mute bilateral biceps, triceps, brachioradialis, knee and ankle  Toes flexor bilaterally    Gait: normal and stable.  [place].  LABS:                        14.7   9.34  )-----------( 348      ( 16 Jun 2019 06:14 )             45.4     06-16    140  |  108  |  12  ----------------------------<  101<H>  3.8   |  23  |  0.69    Ca    9.3      16 Jun 2019 06:14            RADIOLOGY & ADDITIONAL TESTS:  < from: HILARIA w/Doppler (06.12.19 @ 15:32) >  YOB: 1985   Age: 33 (F)   MR#: [id number]  Study Date: 6/12/2019  Location: [address] [place], [place] [address]onographer: [facility]  Study quality: Good  Referring Physician:  RACHEL BLACKWELL MD  Blood Pressure: 108/76 mmHg  Height: 167 cm  Weight: 88 kg  BSA: 2 m2  ------------------------------------------------------------------------    PROCEDURE: Transesophageal echocardiogram was performed in  the echocardiography laboratory.  The patient was sedated  with Propofol  200 mg IV.  The procedure was monitored with  automatic blood pressure monitoring,  ECG tracings and  pulse oximetry. Gag reflex was abolished with topical  lidocaine and the transesophageal probe was placed in the  esophagus posterior to the heart without any complications.   The patient tolerated the procedure well.  Patient was injected with 10 cc's of aerosolized saline.  INDICATION: Other cerebrovascular disease (I67.89)  HISTORY:  ------------------------------------------------------------------------  DIMENSIONS:  Dimensions:     Normal Values:  LA:       ---     2.0 - 4.0 cm  Ao:     3.0 cm    2.0 - 3.8 cm  SEPTUM:   ---     0.6 - 1.2 cm  PWT:      ---     0.6 - 1.1 cm  LVIDd:    ---     3.0 - 5.6 cm  LVIDs:    --- 1.8 - 4.0 cm        ------------------------------------------------------------------------  OBSERVATIONS:  Mitral Valve: Normal mitral valve. Mitral valve appears  normal on 3D reconstruction. Trace mitral regurgitation.  Aortic Root: Aortic Root:3.0 cm.  Ascending Aorta: 2.8 cm.   Normal aortic root. No atheroma is seen in the descending  aorta and aortic arch.  Aortic Valve: Normal trileaflet aortic valve. No aortic  stenosis. No aortic valve regurgitation seen.  Left Atrium: No left atrialor left atrial appendage  thrombus. Left atrial appendage velocity is normal at 0.91  m/s  Left Ventricle: Normal Left Ventricular Systolic Function,  (EF = 55 to 60%) Diastolic function not assessed.  Right Heart: No clot seen in right atrium. Normal right  ventricular size and function. Normal tricuspid valve.  Trace tricuspid regurgitation. Normal pulmonic valve. Trace  pulmonic insufficiency is noted.  Pericardium/PleuraNo pericardial effusion.  Hemodynamic: An atrial septal aneurysm is noted. Agitated  saline injection revealed bubbles in the left heart,  consistent with patent foramen ovale or atrial septal  defect.  ------------------------------------------------------------------------  CONCLUSIONS:  1. Normal mitral valve. Mitral valve appears normal on 3D  reconstruction. Trace mitral regurgitation.  2. Normal trileaflet aortic valve. No aortic stenosis. No  aortic valve regurgitation seen.  3. Normal aortic root. No atheroma is seen in the  descending aorta and aortic arch.  4. No left atrial or left atrial appendage thrombus.  5. Normal Left Ventricular Systolic Function,  (EF = 55 to  60%)  6. No clot seen in right atrium.  7. Normal right ventricular size and function.  8. Normal tricuspid valve. Trace tricuspid regurgitation.  9. Normal pulmonic valve. Trace pulmonic insufficiency is  noted.  10. An atrial septal aneurysm is noted. Agitated saline  injection revealed bubbles in the left heart, consistent  with patent foramen ovale or atrial septal defect.  11. No pericardial effusion.  12. No cardiac source of embolus is identified.    *** Compared with echocardiogram of 6/10/2019, a HILARIA was  performed and additional information provided.  ------------------------------------------------------------------------  Confirmed on  6/12/2019 - 11:45:51 by Trae Hennessy MD  ------------------------------------------------------------------------    < end of copied text >  < from: HILARIA w/Doppler (06.12.19 @ 15:32) >  YOB: 1985   Age: 33 (F)   MR#: [id number]  Study Date: 6/12/2019  Location: [address] [place], [place] [address]onographer: [facility]  Study quality: Good  Referring Physician:  RACHEL BLACKWELL MD  Blood Pressure: 108/76 mmHg  Height: 167 cm  Weight: 88 kg  BSA: 2 m2  ------------------------------------------------------------------------    PROCEDURE: Transesophageal echocardiogram was performed in  the echocardiography laboratory.  The patient was sedated  with Propofol  200 mg IV.  The procedure was monitored with  automatic blood pressure monitoring,  ECG tracings and  pulse oximetry. Gag reflex was abolished with topical  lidocaine and the transesophageal probe was placed in the  esophagus posterior to the heart without any complications.   The patient tolerated the procedure well.  Patient was injected with 10 cc's of aerosolized saline.  INDICATION: Other cerebrovascular disease (I67.89)  HISTORY:  ------------------------------------------------------------------------  DIMENSIONS:  Dimensions:     Normal Values:  LA:       ---     2.0 - 4.0 cm  Ao:     3.0 cm    2.0 - 3.8 cm  SEPTUM:   ---     0.6 - 1.2 cm  PWT:      ---     0.6 - 1.1 cm  LVIDd:    ---     3.0 - 5.6 cm  LVIDs:    --- 1.8 - 4.0 cm        ------------------------------------------------------------------------  OBSERVATIONS:  Mitral Valve: Normal mitral valve. Mitral valve appears  normal on 3D reconstruction. Trace mitral regurgitation.  Aortic Root: Aortic Root:3.0 cm.  Ascending Aorta: 2.8 cm.   Normal aortic root. No atheroma is seen in the descending  aorta and aortic arch.  Aortic Valve: Normal trileaflet aortic valve. No aortic  stenosis. No aortic valve regurgitation seen.  Left Atrium: No left atrialor left atrial appendage  thrombus. Left atrial appendage velocity is normal at 0.91  m/s  Left Ventricle: Normal Left Ventricular Systolic Function,  (EF = 55 to 60%) Diastolic function not assessed.  Right Heart: No clot seen in right atrium. Normal right  ventricular size and function. Normal tricuspid valve.  Trace tricuspid regurgitation. Normal pulmonic valve. Trace  pulmonic insufficiency is noted.  Pericardium/PleuraNo pericardial effusion.  Hemodynamic: An atrial septal aneurysm is noted. Agitated  saline injection revealed bubbles in the left heart,  consistent with patent foramen ovale or atrial septal  defect.  ------------------------------------------------------------------------  CONCLUSIONS:  1. Normal mitral valve. Mitral valve appears normal on 3D  reconstruction. Trace mitral regurgitation.  2. Normal trileaflet aortic valve. No aortic stenosis. No  aortic valve regurgitation seen.  3. Normal aortic root. No atheroma is seen in the  descending aorta and aortic arch.  4. No left atrial or left atrial appendage thrombus.  5. Normal Left Ventricular Systolic Function,  (EF = 55 to  60%)  6. No clot seen in right atrium.  7. Normal right ventricular size and function.  8. Normal tricuspid valve. Trace tricuspid regurgitation.  9. Normal pulmonic valve. Trace pulmonic insufficiency is  noted.  10. An atrial septal aneurysm is noted. Agitated saline  injection revealed bubbles in the left heart, consistent  with patent foramen ovale or atrial septal defect.  11. No pericardial effusion.  12. No cardiac source of embolus is identified.    *** Compared with echocardiogram of 6/10/2019, a HILARIA was  performed and additional information provided.  ------------------------------------------------------------------------  Confirmed on  6/12/2019 - 11:45:51 by Trae Hennessy MD  ------------------------------------------------------------------------    < end of copied text >

## 2019-06-17 NOTE — PROGRESS NOTE BEHAVIORAL HEALTH - OTHER
knows she is here for stroke limited guarded, evasive at times mumbling overall goal directed, at times confused overall goal directed, and linear

## 2019-06-17 NOTE — PROGRESS NOTE ADULT - SUBJECTIVE AND OBJECTIVE BOX
PRESENTING CC:Aphasia    SUBJ: 33 F pt with no significant PMHx brought to ED by  for nonverbal status, flat mood, drowsiness for past 3 days.n ED, vitals are stable. EKG NSR. CT head showed BL focal hypodensities in thalami. MR angio head /wo contrast nonsignificant.HILARIA done for concern of embolic stroke : showed no cardiac thrombus but ASD with PFO   -cardio f/u for possible transfer for PFO closure --discussed with Structural Heart disease Dr Pereira at [facility]-optimal time for PFO closure in 4 weeks after CVA,      PMH -reviewed admission note, no change since admission  Heart failure: acute [ ] chronic [ ] acute or chronic [ ] diastolic [ ] systolic [ ] combined systolic and diastolic[ ]  DARIEL: ATN[ ] renal medullary necrosis [ ] CKD I [ ]CKDII [ ]CKD III [ ]CKD IV [ ]CKD V [ ]Other pathological lesions [ ]    MEDICATIONS  (STANDING):  aspirin  chewable 81 milliGRAM(s) Oral daily  atorvastatin 40 milliGRAM(s) Oral at bedtime  clopidogrel Tablet 75 milliGRAM(s) Oral daily  dextrose 5% + sodium chloride 0.9%. 1000 milliLiter(s) (70 mL/Hr) IV Continuous <Continuous>  ergocalciferol 13416 Unit(s) Oral <User Schedule>  heparin  Injectable 5000 Unit(s) SubCutaneous every 8 hours    MEDICATIONS  (PRN):  diphenhydrAMINE   Injectable 50 milliGRAM(s) IntraMuscular every 6 hours PRN agitation  haloperidol    Injectable 5 milliGRAM(s) IntraMuscular every 6 hours PRN Agitation          FAMILY HISTORY:    No family history of premature coronary artery disease or sudden cardiac death      REVIEW OF SYSTEMS:  Constitutional: [ ] fever, [ ]weight loss,  [ ]fatigue  Eyes: [ ] visual changes  Respiratory: [ ]shortness of breath;  [ ] cough, [ ]wheezing, [ ]chills, [ ]hemoptysis  Cardiovascular: [ ] chest pain, [ ]palpitations, [ ]dizziness,  [ ]leg swelling[ ]orthopnea[ ]PND  Gastrointestinal: [ ] abdominal pain, [ ]nausea, [ ]vomiting,  [ ]diarrhea   Genitourinary: [ ] dysuria, [ ] hematuria  Neurologic: [ ] headaches [ ] tremors[ ]weakness  Skin: [ ] itching, [ ]burning, [ ] rashes  Endocrine: [ ] heat or cold intolerance  Musculoskeletal: [ ] joint pain or swelling; [ ] muscle, back, or extremity pain  Psychiatric: [ ] depression, [ ]anxiety, [ ]mood swings, or [ ]difficulty sleeping  Hematologic: [ ] easy bruising, [ ] bleeding gums    [x] All remaining systems negative except as per above.   [ ]Unable to obtain.    Vital Signs Last 24 Hrs  T(C): 36.4 (17 Jun 2019 05:30), Max: 36.6 (16 Jun 2019 13:24)  T(F): 97.5 (17 Jun 2019 05:30), Max: 97.9 (16 Jun 2019 13:24)  HR: 95 (17 Jun 2019 05:30) (88 - 95)  BP: 110/67 (17 Jun 2019 05:30) (90/75 - 110/67)  RR: 17 (17 Jun 2019 05:30) (14 - 17)  SpO2: 97% (17 Jun 2019 05:30) (97% - 99%)  I&O's Summary    16 Jun 2019 07:01  -  17 Jun 2019 07:00  --------------------------------------------------------  IN: 240 mL / OUT: 0 mL / NET: 240 mL        PHYSICAL EXAM:  General: No acute distress BMI-31.6  HEENT: EOMI, PERRL  Neck: Supple, [ ] JVD  Lungs: Equal air entry bilaterally; [ ] rales [ ] wheezing [ ] rhonchi  Heart: Regular rate and rhythm; [x ] murmur   2/6 [x ] systolic [ ] diastolic [ ] radiation[ ] rubs [ ]  gallops  Abdomen: Nontender, bowel sounds present  Extremities: No clubbing, cyanosis, [ ] edema  Nervous system:  Alert & Oriented X3, no focal deficits  Psychiatric: Normal affect  Skin: No rashes or lesions    LABS:  06-16    140  |  108  |  12  ----------------------------<  101<H>  3.8   |  23  |  0.69    Ca    9.3      16 Jun 2019 06:14      Creatinine Trend: 0.69<--, 0.61<--, 0.80<--, 0.75<--, 0.76<--                        14.7   9.34  )-----------( 348      ( 16 Jun 2019 06:14 )             45.4         ECHO:  Study Date: 6/12/2019  Normal Left Ventricular Systolic Function,  (EF = 55 to 60%)   No clot seen in right atrium.  No left atrial or left atrial appendage thrombus.  An atrial septal aneurysm is noted.   Agitated saline injection revealed bubbles in the left heart, consistent with patent foramen ovale or atrial septal defect.  No cardiac source of embolus is identified.      IMPRESSION AND PLAN:    Problem/Plan - 1:  ·  Problem: Thalamic stroke.  Plan: -Patient able to speak, but not understanding, comprehending or cooperating   -MR angio head /wo contrast nonsignificant.   -MR no cont head: b/l thalamic stroke  -HILARIA done for concern of embolic stroke : showed no cardiac thrombus but ASD with PFO  -For PFO/ASD closure after 4 weeks Dr Pereira-[facility].  -Continue Aspirin/Plavix  -Cardiac status is otherwise stable for In Patient Psych facility.

## 2019-06-18 ENCOUNTER — INPATIENT (INPATIENT)
Facility: HOSPITAL | Age: 34
LOS: 5 days | Discharge: HOME | DRG: 885 | End: 2019-06-24
Attending: PSYCHIATRY & NEUROLOGY | Admitting: PSYCHIATRY & NEUROLOGY
Payer: MEDICAID

## 2019-06-18 VITALS
HEART RATE: 110 BPM | TEMPERATURE: 98 F | OXYGEN SATURATION: 100 % | DIASTOLIC BLOOD PRESSURE: 70 MMHG | RESPIRATION RATE: 16 BRPM | SYSTOLIC BLOOD PRESSURE: 109 MMHG | WEIGHT: 195.77 LBS | HEIGHT: 64 IN

## 2019-06-18 VITALS
TEMPERATURE: 98 F | HEART RATE: 85 BPM | OXYGEN SATURATION: 100 % | SYSTOLIC BLOOD PRESSURE: 134 MMHG | DIASTOLIC BLOOD PRESSURE: 90 MMHG | RESPIRATION RATE: 16 BRPM

## 2019-06-18 DIAGNOSIS — F33.2 MAJOR DEPRESSIVE DISORDER, RECURRENT SEVERE WITHOUT PSYCHOTIC FEATURES: ICD-10-CM

## 2019-06-18 LAB
ALBUMIN SERPL ELPH-MCNC: 4.1 G/DL — SIGNIFICANT CHANGE UP (ref 3.5–5)
ALP SERPL-CCNC: 91 U/L — SIGNIFICANT CHANGE UP (ref 40–120)
ALT FLD-CCNC: 28 U/L DA — SIGNIFICANT CHANGE UP (ref 10–60)
ANION GAP SERPL CALC-SCNC: 11 MMOL/L — SIGNIFICANT CHANGE UP (ref 5–17)
APCR PPP: 2.89 RATIO — SIGNIFICANT CHANGE UP
AST SERPL-CCNC: 12 U/L — SIGNIFICANT CHANGE UP (ref 10–40)
BASOPHILS # BLD AUTO: 0.05 K/UL — SIGNIFICANT CHANGE UP (ref 0–0.2)
BASOPHILS NFR BLD AUTO: 0.5 % — SIGNIFICANT CHANGE UP (ref 0–2)
BILIRUB SERPL-MCNC: 0.6 MG/DL — SIGNIFICANT CHANGE UP (ref 0.2–1.2)
BUN SERPL-MCNC: 9 MG/DL — SIGNIFICANT CHANGE UP (ref 7–18)
CALCIUM SERPL-MCNC: 9 MG/DL — SIGNIFICANT CHANGE UP (ref 8.4–10.5)
CHLORIDE SERPL-SCNC: 103 MMOL/L — SIGNIFICANT CHANGE UP (ref 96–108)
CO2 SERPL-SCNC: 25 MMOL/L — SIGNIFICANT CHANGE UP (ref 22–31)
CREAT SERPL-MCNC: 0.78 MG/DL — SIGNIFICANT CHANGE UP (ref 0.5–1.3)
DNA PLOIDY SPEC FC-IMP: SIGNIFICANT CHANGE UP
EOSINOPHIL # BLD AUTO: 0.09 K/UL — SIGNIFICANT CHANGE UP (ref 0–0.5)
EOSINOPHIL NFR BLD AUTO: 0.9 % — SIGNIFICANT CHANGE UP (ref 0–6)
GLUCOSE SERPL-MCNC: 114 MG/DL — HIGH (ref 70–99)
HCT VFR BLD CALC: 45.4 % — HIGH (ref 34.5–45)
HGB BLD-MCNC: 14.9 G/DL — SIGNIFICANT CHANGE UP (ref 11.5–15.5)
IMM GRANULOCYTES NFR BLD AUTO: 0.3 % — SIGNIFICANT CHANGE UP (ref 0–1.5)
LYMPHOCYTES # BLD AUTO: 2.5 K/UL — SIGNIFICANT CHANGE UP (ref 1–3.3)
LYMPHOCYTES # BLD AUTO: 24.7 % — SIGNIFICANT CHANGE UP (ref 13–44)
MAGNESIUM SERPL-MCNC: 2.1 MG/DL — SIGNIFICANT CHANGE UP (ref 1.6–2.6)
MCHC RBC-ENTMCNC: 29.2 PG — SIGNIFICANT CHANGE UP (ref 27–34)
MCHC RBC-ENTMCNC: 32.8 GM/DL — SIGNIFICANT CHANGE UP (ref 32–36)
MCV RBC AUTO: 88.8 FL — SIGNIFICANT CHANGE UP (ref 80–100)
MONOCYTES # BLD AUTO: 0.83 K/UL — SIGNIFICANT CHANGE UP (ref 0–0.9)
MONOCYTES NFR BLD AUTO: 8.2 % — SIGNIFICANT CHANGE UP (ref 2–14)
NEUTROPHILS # BLD AUTO: 6.63 K/UL — SIGNIFICANT CHANGE UP (ref 1.8–7.4)
NEUTROPHILS NFR BLD AUTO: 65.4 % — SIGNIFICANT CHANGE UP (ref 43–77)
NRBC # BLD: 0 /100 WBCS — SIGNIFICANT CHANGE UP (ref 0–0)
PHOSPHATE SERPL-MCNC: 3 MG/DL — SIGNIFICANT CHANGE UP (ref 2.5–4.5)
PLATELET # BLD AUTO: 376 K/UL — SIGNIFICANT CHANGE UP (ref 150–400)
POTASSIUM SERPL-MCNC: 3.5 MMOL/L — SIGNIFICANT CHANGE UP (ref 3.5–5.3)
POTASSIUM SERPL-SCNC: 3.5 MMOL/L — SIGNIFICANT CHANGE UP (ref 3.5–5.3)
PROT SERPL-MCNC: 8.6 G/DL — HIGH (ref 6–8.3)
PTR INTERPRETATION: SIGNIFICANT CHANGE UP
RBC # BLD: 5.11 M/UL — SIGNIFICANT CHANGE UP (ref 3.8–5.2)
RBC # FLD: 12.5 % — SIGNIFICANT CHANGE UP (ref 10.3–14.5)
SODIUM SERPL-SCNC: 139 MMOL/L — SIGNIFICANT CHANGE UP (ref 135–145)
WBC # BLD: 10.13 K/UL — SIGNIFICANT CHANGE UP (ref 3.8–10.5)
WBC # FLD AUTO: 10.13 K/UL — SIGNIFICANT CHANGE UP (ref 3.8–10.5)

## 2019-06-18 RX ORDER — MIRTAZAPINE 45 MG/1
7.5 TABLET, ORALLY DISINTEGRATING ORAL AT BEDTIME
Refills: 0 | Status: DISCONTINUED | OUTPATIENT
Start: 2019-06-18 | End: 2019-06-20

## 2019-06-18 RX ORDER — HALOPERIDOL DECANOATE 100 MG/ML
5 INJECTION INTRAMUSCULAR EVERY 8 HOURS
Refills: 0 | Status: DISCONTINUED | OUTPATIENT
Start: 2019-06-18 | End: 2019-06-24

## 2019-06-18 RX ADMIN — Medication 50 MILLIGRAM(S): at 14:38

## 2019-06-18 RX ADMIN — MIRTAZAPINE 7.5 MILLIGRAM(S): 45 TABLET, ORALLY DISINTEGRATING ORAL at 22:31

## 2019-06-18 RX ADMIN — Medication 2 MILLIGRAM(S): at 15:50

## 2019-06-18 RX ADMIN — HALOPERIDOL DECANOATE 5 MILLIGRAM(S): 100 INJECTION INTRAMUSCULAR at 14:38

## 2019-06-18 NOTE — PROGRESS NOTE ADULT - PROVIDER SPECIALTY LIST ADULT
Cardiology
Internal Medicine
Neurology
Internal Medicine

## 2019-06-18 NOTE — PROGRESS NOTE ADULT - PROBLEM SELECTOR PROBLEM 1
Aphasia
Aphasia
Thalamic stroke

## 2019-06-18 NOTE — PROGRESS NOTE ADULT - REASON FOR ADMISSION
Inability to speak

## 2019-06-18 NOTE — ACUTE INTERFACILITY TRANSFER NOTE - PLAN OF CARE
prevent recurrent strokes - You presented with inability to speak, but not understanding, comprehending or cooperating   -MR angio head /wo contrast nonsignificant.   -MR no cont head showed bilateral thalamic stroke  -Transesophageal echo showed atrial septal defect with Patent foramen ovale   - - You need to follow up with cardiologist in 4 weeks for closure of Patent foramen ovale   - - You need to continue on aspirin and statin and  plavix   -Psych consulted evaluated no capacity to make decisions , mental status improving overall   -Hyper coagulable workup was negative  -EEG showed: Mild nonspecific diffuse or multifocal cerebral dysfunction. No epileptiform abnormalities  Psych Dr. Banegas consulted for 2 PC transfer.  - You are medically stable to be transferred for inpatient Psych hospital   - You need to follow up with your Primary Care Physician in 1 week.  - You need to continue your medications regularly as prescribed.  -Follow up with Dr. Cao as an outpatient within 1 week of discharge at:  Address: [address], [place] [address]  Phone: [phone] You were found to have low vitamin D level  - You need to continue on Vitamin D supplement

## 2019-06-18 NOTE — ACUTE INTERFACILITY TRANSFER NOTE - HOSPITAL COURSE
33 F pt with no significant PMHx brought to ED by  for nonverbal status, flat mood, drowsiness for past 3 days. History primarily obtained through . Inability to speak, mood changes, and drowsiness are abrupt changes from her baseline. Pt never had these set of symptoms. Pt is aware that she is not able to verbalize but denies having weakness, sensory changes, balancing problems, vision/hearing changes, except for BL hand numbness pt had last Thursday, which has resolved since then.  endorses pt acutely started having difficulty with short-term memory for the same duration. Denies personal or family history of psychiatric illnesses, CVA, or seizure. Denies depression, hallucinations, delusions, or history of taking neuroleptic or antipsychotic medications. Denies fever, chills, night sweats, recent travel history, sick contacts, abdominal pain, or urinary/bowel movement habit changes.  In ED, vitals are stable. EKG NSR. CT head showed BL focal hypodensities in thalami. MR angio head /wo contrast nonsignificant. UDS negative. Serum alcohol level nonsignificant. (09 Jun 2019 17:40) . Patient (pt) was diagnosed with thalamic stroke. Patient unable to speak, although understands spoken language . MR angio head /wo contrast nonsignificant. MR no cont head showed bilateral thalamic stroke . Transesophageal echo showed atrial septal defect with Patent foramen ovale  . Psych consulted evaluated no capacity to make decisions , mental status improving overall . Hyper coagulable workup was negative . EEG showed: Mild nonspecific diffuse or multifocal cerebral dysfunction. No epileptiform abnormalities . Psych Dr. Banegas consulted for 2 PC transfer . medically stable to be transferred for inpatient Psych hospital . Given patient's improved clinical status and current hemodynamic stability, decision was made to discharge the patient. . Patient is stable for trasnfer per attending and is advised to follow up with PCP as outpatient  Please refer to patient's complete medical chart with documents for a full hospital course, for this is only a brief summary.

## 2019-06-18 NOTE — PROGRESS NOTE ADULT - SUBJECTIVE AND OBJECTIVE BOX
PGY1 Note discussed with Supervising Resident and Primary Attending.    Patient is a 33y old  Female who presents with a chief complaint of Inability to speak (17 Jun 2019 18:31)      INTERVAL HPI/OVERNIGHT EVENTS :    ***********************************************************************************************************    MEDICATIONS  (STANDING):  aspirin  chewable 81 milliGRAM(s) Oral daily  atorvastatin 40 milliGRAM(s) Oral at bedtime  clopidogrel Tablet 75 milliGRAM(s) Oral daily  dextrose 5% + sodium chloride 0.9%. 1000 milliLiter(s) (70 mL/Hr) IV Continuous <Continuous>  ergocalciferol 85892 Unit(s) Oral <User Schedule>  heparin  Injectable 5000 Unit(s) SubCutaneous every 8 hours    MEDICATIONS  (PRN):  diphenhydrAMINE   Injectable 50 milliGRAM(s) IntraMuscular every 6 hours PRN agitation  haloperidol    Injectable 5 milliGRAM(s) IntraMuscular every 6 hours PRN Agitation      ***********************************************************************************************************    Allergies    penicillin (Rash)    Intolerances        ***********************************************************************************************************    REVIEW OF SYSTEMS :  * CONSTITUTIONAL      : No Fever, Weight loss, or Fatigue  * EYES                             : No eye pain , Visual disturbances or Discharge  * RESPIRATORY             : No Cough, Wheezing, Chills or Hemoptysis; No shortness of breath  * CARDIOVASCULAR     : No Chest pain, Palpitations, Dizziness, or Leg swelling  * GASTROINTESTINAL  : No Abdominal or Epigastric pain. No Nausea, Vomiting or Hematemesis; No Diarrhea or Constipation. No Melena or Hematochezia.  * GENITOURINARY        : No Dysuria , Frequency , Haematuria   * NEUROLOGICAL          : No Headaches, Memory loss, Loss of trength, Numbness, or Tremors  * MUSCULOSKELETAL   : No Joint pain  * PSYCHIATRY                 : No Depression or Anxiety   * HEME/LYMPH              : No Easy Bruising or Bleeding gums  * SKIN                               : No Itching, Burning, Rashes, or Lesions     ***********************************************************************************************************    Vital Signs Last 24 Hrs  T(C): 36.5 (18 Jun 2019 05:21), Max: 36.8 (17 Jun 2019 21:07)  T(F): 97.7 (18 Jun 2019 05:21), Max: 98.2 (17 Jun 2019 21:07)  HR: 87 (18 Jun 2019 05:21) (66 - 87)  BP: 117/80 (18 Jun 2019 05:21) (106/50 - 117/80)  BP(mean): --  RR: 18 (18 Jun 2019 05:21) (16 - 18)  SpO2: 100% (18 Jun 2019 05:21) (96% - 100%)    ***********************************************************************************************************    PHYSICAL EXAM :  * GENERAL                 : NAD, Well-groomed, Well-developed  * HEAD                       :  Atraumatic, Normocephalic  * EYES                         : EOMI, PERRLA, Conjunctiva and Sclera clear  * ENT                           : Moist Mucous Membranes  * NECK                         : Supple, No JVD, Normal Thyroid  * CHEST/LUNG           : Clear to Auscultation bilaterally; No Rales, Rhonchi, Wheezing or Rubs  * HEART                       : Regular Rate and Rhythm; No murmurs, Rubs or gallops  * ABDOMEN                : Soft, Non-tender, Non-distended; Bowel Sounds present  * NERVOUS SYSTEM  :  Alert & Oriented X3, Good Concentration; Motor Strength 5/5 B/L UL LL ; DTRs 2+ Intact and Symmetric  * EXTREMITIES            :  2+ Peripheral Pulses, No clubbing, cyanosis, or edema  * SKIN                           : No Rashes or Lesions    **********************************************************************************************************  LABS:                CAPILLARY BLOOD GLUCOSE          **********************************************************************************************************    RADIOLOGY & ADDITIONAL TESTS:   No radiological imaging was required    Imaging Personally Reviewed   :  [ ] YES  [ ] NO    Consultant(s) Notes Reviewed :  [ ] YES  [ ] NO

## 2019-06-18 NOTE — PROGRESS NOTE ADULT - PROBLEM SELECTOR PLAN 1
-Patient able to speak, but not understanding, comprehending or cooperating   -MR angio head /wo contrast nonsignificant.   -MR no cont head: b/l thalamic stroke  -HILARIA showed ASD with PFO   - PFO closure in 4 weeks  -c/w asa and statin and  plavix   -Psych eval: no capacity to make decisions , mental status improving overall   -Regular Neuro checks  -Hyper coagulable w/u -ve so far, f/u final results  -EEG showed: Mild nonspecific diffuse or multifocal cerebral dysfunction. No epileptiform abnormalities  pt is agitated asking to leave AMA   Psych Dr. Banegas consulted for 2 PC transfer

## 2019-06-19 DIAGNOSIS — F32.9 MAJOR DEPRESSIVE DISORDER, SINGLE EPISODE, UNSPECIFIED: ICD-10-CM

## 2019-06-19 LAB — HCG UR QL: NEGATIVE — SIGNIFICANT CHANGE UP

## 2019-06-19 PROCEDURE — 99222 1ST HOSP IP/OBS MODERATE 55: CPT

## 2019-06-19 RX ADMIN — MIRTAZAPINE 7.5 MILLIGRAM(S): 45 TABLET, ORALLY DISINTEGRATING ORAL at 20:33

## 2019-06-19 NOTE — PROGRESS NOTE BEHAVIORAL HEALTH - NSBHFUPIPCHARTREVFT_PSY_A_CORE
HPI: 33  [place] female with no prior psych history  and no  significant PMHx brought to ED by  for nonverbal status, flat mood, drowsiness for past 3 days. reports pt has been sleeping all day c/o feeling tired, does not  phone calls, has not been gone to work in 3 day, difficulty with short-term memory for the same duration including her telephone number. Denies personal or family history of psychiatric illnesses, CVA, or seizure. Denies depression, hallucinations, delusions, or history of taking neuroleptic or antipsychotic medications. vitals are stable. EKG NSR. CT head showed BL focal hypo-densities in thalami. MR angio head /wo contrast nonsignificant. UDS negative. Serum alcohol level nonsignificant.  MR head /w contrast, F/u MR venogram head /wo contrast pending. medical work in progress r/o organic cause  for acute change in mental status. pt on exam calm, refuse to engage in conversation initially but several  prompts pt communicated minimally. aaoself, did not know the date or address. denies mood sxs or psychosis. reports her 8 month old child was sick that is why she came to hospital but when asked she was admitted, she was unable to state the reason and keep saying random things- her mouth, oral, vitamin. pt had word finding difficulty .   spoke to her  for collaterals . reports has a 10 y/o child in [facility] living with  his mother in law. pt and her  had to leave [facility] in 2015 due to her  political issues with the Government since he wrote articles  against the Government as a . pt has been stressed out not able to see her child and her mother who is not well herself is taking care of her child. pt did not have post partum depression or psychosis with her both pregnancy. pt has been c/o feeling tired since 5 days and has not bene eating . her  has been taking care of their 8 month old child. pt reportedly forgot how to change the diaper.

## 2019-06-20 LAB
CHOLEST SERPL-MCNC: 107 MG/DL — SIGNIFICANT CHANGE UP (ref 10–199)
HBA1C BLD-MCNC: 5.7 % — HIGH (ref 4–5.6)
HDLC SERPL-MCNC: 33 MG/DL — LOW
LIPID PNL WITH DIRECT LDL SERPL: 60 MG/DL — SIGNIFICANT CHANGE UP
MTHFR GENE INTERPRETATION: SIGNIFICANT CHANGE UP
T PALLIDUM AB TITR SER: NEGATIVE — SIGNIFICANT CHANGE UP
TOTAL CHOLESTEROL/HDL RATIO MEASUREMENT: 3.2 RATIO — LOW (ref 3.3–7.1)
TRIGL SERPL-MCNC: 72 MG/DL — SIGNIFICANT CHANGE UP (ref 10–149)
TSH SERPL-MCNC: 0.43 UIU/ML — SIGNIFICANT CHANGE UP (ref 0.27–4.2)

## 2019-06-20 PROCEDURE — 99231 SBSQ HOSP IP/OBS SF/LOW 25: CPT

## 2019-06-20 RX ORDER — MIRTAZAPINE 45 MG/1
15 TABLET, ORALLY DISINTEGRATING ORAL AT BEDTIME
Refills: 0 | Status: DISCONTINUED | OUTPATIENT
Start: 2019-06-20 | End: 2019-06-24

## 2019-06-20 RX ADMIN — MIRTAZAPINE 15 MILLIGRAM(S): 45 TABLET, ORALLY DISINTEGRATING ORAL at 20:19

## 2019-06-21 PROCEDURE — 99231 SBSQ HOSP IP/OBS SF/LOW 25: CPT

## 2019-06-21 RX ADMIN — MIRTAZAPINE 15 MILLIGRAM(S): 45 TABLET, ORALLY DISINTEGRATING ORAL at 21:14

## 2019-06-22 NOTE — PROGRESS NOTE BEHAVIORAL HEALTH - NSBHADMITCOUNSEL_PSY_A_CORE
diagnostic results/impressions and/or recommended studies/importance of adherence to chosen treatment
diagnostic results/impressions and/or recommended studies

## 2019-06-22 NOTE — PROGRESS NOTE BEHAVIORAL HEALTH - NSBHATTESTSEENBY_PSY_A_CORE
no
attending Psychiatrist without NP/Trainee

## 2019-06-22 NOTE — PROGRESS NOTE BEHAVIORAL HEALTH - NSBHFUPINTERVALHXFT_PSY_A_CORE
Patient a 34 y/o  female, from [facility], domiciled with her  and 10 month old son, with no prior Psychiatric hx, no prior SI/SA, no drug/legal issues was BIB/EMS to [facility] and later was transferred to [facility] for ongoing care psychiatrically.    Patient in bed all day, poor self care, prefers to stay in her room, depressed, tired and fatigued, no participation in unit activities. No PRN meds needed, but continues to remain aloof and only visible for meal time. Remeron increased to 15 mg HS. F/U in AM
Patient was noted to be curled up in her bed. She endorsed sleeping well last night. She stated that she felt depressed. She stated that she was not aware of her reason to be in the hospital. She has a fair appetite.  She is taking her medications and denies any side effects. . To continue with Remeron 15 mg HS. She stated that she wants to leave here and go home. No PSYCHOTIC SYMPTOMS. No SI.
Patient a 32 y/o  female, from [facility], domiciled with her  and 10 month old son, with no prior Psychiatric hx, no prior SI/SA, no drug/legal issues was BIB/EMS to [facility] and later was transferred to [facility] for ongoing care psychiatrically.    Patient ia more visible in unit now, more awake, and oriented, and has some comments, that she wants to leave as she feels that he is OK and would like to go on Sunday, she was explained that she is not able to go on Sunday. She understood, later she started to cry as she is staying here for no reasons. To continue with Remeron 15 mg HS. SW informed to update dischasr
Patient a 34 y/o  female, [facility], domiciled with her  and 10 month old son, with no prior Psychiatric hx, no prior SI/SA, no drug/legal issues was BIB/EMS to [facility] and later was transferred to [facility] for ongoing care psychiatrically.    Patient in bed, limited English, but understandable, and endorsed that he daughter in [facility] since [facility] and who is 10 y/o now. She is depressed as her eldest chil in there and the family is living here in [place] without her daughter. She is domiciled in [place] as political asylee and the case to be resolved in November of 2019. 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 She endorsed that her  who is a  in [facility] wrote something in newspaper and due to repercussions, he had to leave the country immediately leaving her daughter. Her daughter would be able to come once the case is resolved in November. She denied perceptual; experiences, denied paranoid feelings, denied drug abuse, denied any manic or hypo-manic symptoms at this time.

## 2019-06-22 NOTE — PROGRESS NOTE BEHAVIORAL HEALTH - NSBHADMITMEDEDUDETAILS_A_CORE FT
Discussed risks/benefits/s-e

## 2019-06-22 NOTE — PROGRESS NOTE BEHAVIORAL HEALTH - THOUGHT PROCESS
Impaired reasoning/Disorganized
Linear

## 2019-06-22 NOTE — PROGRESS NOTE BEHAVIORAL HEALTH - NSBHADMITIPREASONDETAILS_PSY_A_CORE FT
Depressed and acting bizarre

## 2019-06-22 NOTE — PROGRESS NOTE BEHAVIORAL HEALTH - NSBHCHARTREVIEWVS_PSY_A_CORE FT
Vital Signs Last 24 Hrs  T(C): 36.4 (09 Jun 2019 20:51), Max: 36.8 (09 Jun 2019 11:24)  T(F): 97.6 (09 Jun 2019 20:51), Max: 98.2 (09 Jun 2019 11:24)  HR: 75 (09 Jun 2019 20:51) (75 - 76)  BP: 122/79 (09 Jun 2019 20:51) (111/80 - 122/79)  BP(mean): --  RR: 20 (09 Jun 2019 20:51) (20 - 20)  SpO2: 100% (09 Jun 2019 20:51) (98% - 100%)
Vital Signs Last 24 Hrs  T(C): --  T(F): --  HR: 95 (21 Jun 2019 17:42) (95 - 95)  BP: 118/75 (21 Jun 2019 17:42) (118/75 - 118/75)  BP(mean): --  RR: 16 (21 Jun 2019 17:42) (16 - 16)  SpO2: --
Vital Signs Last 24 Hrs  T(C): 36.4 (09 Jun 2019 20:51), Max: 36.8 (09 Jun 2019 11:24)  T(F): 97.6 (09 Jun 2019 20:51), Max: 98.2 (09 Jun 2019 11:24)  HR: 75 (09 Jun 2019 20:51) (75 - 76)  BP: 122/79 (09 Jun 2019 20:51) (111/80 - 122/79)  BP(mean): --  RR: 20 (09 Jun 2019 20:51) (20 - 20)  SpO2: 100% (09 Jun 2019 20:51) (98% - 100%)
Vital Signs Last 24 Hrs  T(C): 36.4 (09 Jun 2019 20:51), Max: 36.8 (09 Jun 2019 11:24)  T(F): 97.6 (09 Jun 2019 20:51), Max: 98.2 (09 Jun 2019 11:24)  HR: 75 (09 Jun 2019 20:51) (75 - 76)  BP: 122/79 (09 Jun 2019 20:51) (111/80 - 122/79)  BP(mean): --  RR: 20 (09 Jun 2019 20:51) (20 - 20)  SpO2: 100% (09 Jun 2019 20:51) (98% - 100%)

## 2019-06-22 NOTE — PROGRESS NOTE BEHAVIORAL HEALTH - SUMMARY
33  Kyrgyz female with no prior psych history  and no  significant PMHx brought to ED by  for nonverbal status, flat mood, drowsiness for past 3 days. reports pt has been sleeping all day c/o feeling tired, does not  phone calls, has not been gone to work in 3 day, difficulty with short-term memory for the same duration including her telephone number. Denies personal or family history of psychiatric illnesses, CVA, or seizure. Denies depression, hallucinations, delusions, or history of taking neuroleptic or antipsychotic medications. vitals are stable. EKG NSR. CT head showed BL focal hypo-densities in thalami. MR angio head /wo contrast nonsignificant. UDS negative. Serum alcohol level nonsignificant.  MR head /w contrast, F/u MR venogram head /wo contrast pending. medical work in progress r/o organic cause  for acute change in mental status. pt on exam calm, refuse to engage in conversation initially but several  prompts pt communicated minimally. aaoself, did not know the date or address. denies mood sxs or psychosis. reports her 8 month old child was sick that is why she came to hospital but when asked she was admitted, she was unable to state the reason and keep saying random things- her mouth, oral, vitamin. pt had word finding difficulty .   spoke to her  for collaterals . reports has a 10 y/o child in [facility] living with  his mother in law. pt and her  had to leave [facility] in 2015 due to her  political issues with the Government since he wrote articles  against the Government as a . pt has been stressed out not able to see her child and her mother who is not well herself is taking care of her child. pt did not have post partum depression or psychosis with her both pregnancy. pt has been c/o feeling tired since 5 days and has not bene eating . her  has been taking care of their 8 month old child. pt reportedly forgot how to change the diaper.    Plan: To start Remeron 7.5 mg HS for now.
33  Guinean female with no prior psych history  and no  significant PMHx brought to ED by  for nonverbal status, flat mood, drowsiness for past 3 days. reports pt has been sleeping all day c/o feeling tired, does not  phone calls, has not been gone to work in 3 day, difficulty with short-term memory for the same duration including her telephone number. Denies personal or family history of psychiatric illnesses, CVA, or seizure. Denies depression, hallucinations, delusions, or history of taking neuroleptic or antipsychotic medications. vitals are stable. EKG NSR. CT head showed BL focal hypo-densities in thalami. MR angio head /wo contrast nonsignificant. UDS negative. Serum alcohol level nonsignificant.  MR head /w contrast, F/u MR venogram head /wo contrast pending. medical work in progress r/o organic cause  for acute change in mental status. pt on exam calm, refuse to engage in conversation initially but several  prompts pt communicated minimally. aaoself, did not know the date or address. denies mood sxs or psychosis. reports her 8 month old child was sick that is why she came to hospital but when asked she was admitted, she was unable to state the reason and keep saying random things- her mouth, oral, vitamin. pt had word finding difficulty .   spoke to her  for collaterals . reports has a 10 y/o child in [facility] living with  his mother in law. pt and her  had to leave [facility] in 2015 due to her  political issues with the Government since he wrote articles  against the Government as a . pt has been stressed out not able to see her child and her mother who is not well herself is taking care of her child. pt did not have post partum depression or psychosis with her both pregnancy. pt has been c/o feeling tired since 5 days and has not bene eating . her  has been taking care of their 8 month old child. pt reportedly forgot how to change the diaper.    Plan: To start Remeron 7.5 mg HS for now.
33  Bulgarian female with no prior psych history  and no  significant PMHx brought to ED by  for nonverbal status, flat mood, drowsiness for past 3 days. reports pt has been sleeping all day c/o feeling tired, does not  phone calls, has not been gone to work in 3 day, difficulty with short-term memory for the same duration including her telephone number. Denies personal or family history of psychiatric illnesses, CVA, or seizure. Denies depression, hallucinations, delusions, or history of taking neuroleptic or antipsychotic medications. vitals are stable. EKG NSR. CT head showed BL focal hypo-densities in thalami. MR angio head /wo contrast nonsignificant. UDS negative. Serum alcohol level nonsignificant.  MR head /w contrast, F/u MR venogram head /wo contrast pending. medical work in progress r/o organic cause  for acute change in mental status. pt on exam calm, refuse to engage in conversation initially but several  prompts pt communicated minimally. aaoself, did not know the date or address. denies mood sxs or psychosis. reports her 8 month old child was sick that is why she came to hospital but when asked she was admitted, she was unable to state the reason and keep saying random things- her mouth, oral, vitamin. pt had word finding difficulty .   spoke to her  for collaterals . reports has a 10 y/o child in [facility] living with  his mother in law. pt and her  had to leave [facility] in 2015 due to her  political issues with the Government since he wrote articles  against the Government as a . pt has been stressed out not able to see her child and her mother who is not well herself is taking care of her child. pt did not have post partum depression or psychosis with her both pregnancy. pt has been c/o feeling tired since 5 days and has not bene eating . her  has been taking care of their 8 month old child. pt reportedly forgot how to change the diaper.    Plan: To start Remeron 7.5 mg HS for now.
33  Hungarian female with no prior psych history  and no  significant PMHx brought to ED by  for nonverbal status, flat mood, drowsiness for past 3 days. reports pt has been sleeping all day c/o feeling tired, does not  phone calls, has not been gone to work in 3 day, difficulty with short-term memory for the same duration including her telephone number. Denies personal or family history of psychiatric illnesses, CVA, or seizure. Denies depression, hallucinations, delusions, or history of taking neuroleptic or antipsychotic medications. vitals are stable. EKG NSR. CT head showed BL focal hypo-densities in thalami. MR angio head /wo contrast nonsignificant. UDS negative. Serum alcohol level nonsignificant.  MR head /w contrast, F/u MR venogram head /wo contrast pending. medical work in progress r/o organic cause  for acute change in mental status. pt on exam calm, refuse to engage in conversation initially but several  prompts pt communicated minimally. aaoself, did not know the date or address. denies mood sxs or psychosis. reports her 8 month old child was sick that is why she came to hospital but when asked she was admitted, she was unable to state the reason and keep saying random things- her mouth, oral, vitamin. pt had word finding difficulty .   spoke to her  for collaterals . reports has a 10 y/o child in [facility] living with  his mother in law. pt and her  had to leave [facility] in 2015 due to her  political issues with the Government since he wrote articles  against the Government as a . pt has been stressed out not able to see her child and her mother who is not well herself is taking care of her child. pt did not have post partum depression or psychosis with her both pregnancy. pt has been c/o feeling tired since 5 days and has not bene eating . her  has been taking care of their 8 month old child. pt reportedly forgot how to change the diaper.    Plan: To start Remeron 7.5 mg HS for now.

## 2019-06-24 VITALS — WEIGHT: 194.89 LBS

## 2019-06-24 DIAGNOSIS — Z71.89 OTHER SPECIFIED COUNSELING: ICD-10-CM

## 2019-06-24 PROCEDURE — 99239 HOSP IP/OBS DSCHRG MGMT >30: CPT

## 2019-06-24 RX ORDER — ASPIRIN/CALCIUM CARB/MAGNESIUM 324 MG
1 TABLET ORAL
Qty: 30 | Refills: 0
Start: 2019-06-24 | End: 2019-07-23

## 2019-06-24 RX ORDER — ATORVASTATIN CALCIUM 80 MG/1
1 TABLET, FILM COATED ORAL
Qty: 30 | Refills: 0
Start: 2019-06-24 | End: 2019-07-23

## 2019-06-24 RX ORDER — CLOPIDOGREL BISULFATE 75 MG/1
1 TABLET, FILM COATED ORAL
Qty: 30 | Refills: 0
Start: 2019-06-24 | End: 2019-07-23

## 2019-06-24 NOTE — DISCHARGE NOTE BEHAVIORAL HEALTH - PROVIDER TOKENS
FREE:[LAST:[[facility]],PHONE:[phone]],FAX:[(   [phone]    -],ADDRESS:[[address] [place] [address]; [id number]  07/03/2019]]

## 2019-06-24 NOTE — DIETITIAN INITIAL EVALUATION ADULT. - PERTINENT LABORATORY DATA
(6/18) Hgb 14.9 wnl, Hct 45.4 H, Na 139 wnl, K 3.5 wnl, Cl 103 wnl, CO2 25 wnl, BUN 9 wnl, Creat 0.78 wnl, Glu 114 H, HbA1c 5.7 H

## 2019-06-24 NOTE — DISCHARGE NOTE BEHAVIORAL HEALTH - HPI (INCLUDE ILLNESS QUALITY, SEVERITY, DURATION, TIMING, CONTEXT, MODIFYING FACTORS, ASSOCIATED SIGNS AND SYMPTOMS)
3  English female with no prior psych history  and no  significant PMHx brought to ED by  for nonverbal status, flat mood, drowsiness for past 3 days. reports pt has been sleeping all day c/o feeling tired, does not  phone calls, has not been gone to work in 3 day, difficulty with short-term memory for the same duration including her telephone number. Denies personal or family history of psychiatric illnesses, CVA, or seizure. Denies depression, hallucinations, delusions, or history of taking neuroleptic or antipsychotic medications. vitals are stable. EKG NSR. CT head showed BL focal hypo-densities in thalami. MR angio head /wo contrast nonsignificant. UDS negative. Serum alcohol level nonsignificant.  MR head /w contrast, F/u MR venogram head /wo contrast pending. medical work in progress r/o organic cause  for acute change in mental status. pt on exam calm, refuse to engage in conversation initially but several  prompts pt communicated minimally. aaoself, did not know the date or address. denies mood sxs or psychosis. reports her 8 month old child was sick that is why she came to hospital but when asked she was admitted, she was unable to state the reason and keep saying random things- her mouth, oral, vitamin. pt had word finding difficulty .   spoke to her  for collaterals . reports has a 10 y/o child in [facility] living with  his mother in law. pt and her  had to leave [facility] in 2015 due to her  political issues with the Government since he wrote articles  against the Government as a . pt has been stressed out not able to see her child and her mother who is not well herself is taking care of her child. pt did not have post partum depression or psychosis with her both pregnancy. pt has been c/o feeling tired since 5 days and has not bene eating . her  has been taking care of their 8 month old child. pt reportedly forgot how to change the diaper.    Plan: To start Remeron 7.5 mg HS for now.     Patient was admitted involuntarily, and was started on Remeron 7.5 mg HS initially, later Remeron was increased to 15 mg HS. She was in bed for most of the time initially, she was drowsy and did not participate in groups or other unit activities. Slowly she started to ambulate and was more visible in the unit, and took the Remeron 7.5 mg HS initially, later Remeron was increased to 15 mg HS. She took her meds for 1-2 days, and once she was more active she and stopped taking the meds, she feels that it makes her more groggy, drowsy and feel that she would do better without any meds at this time. She prefers to speak with somebody for help at this time. She never tried to commit suicide, and was not S/H nor was she impulsive and has no drug abuse.

## 2019-06-24 NOTE — DISCHARGE NOTE BEHAVIORAL HEALTH - NSBHDCCRISISPLAN2FT_PSY_A_CORE
Utilize healthy coping skills learned on the unit like creating art, listening to music, talk to supports, meditation, and deep breathing. Implement self-care by setting aside some alone time every day to take care of yourself.  Attend local mood disorder groups for additional support.

## 2019-06-24 NOTE — DIETITIAN INITIAL EVALUATION ADULT. - OTHER INFO
32 y/o F admitted w/ severe episode of recurrent depression. Pt tolerating regular, halal diet w/ fair-good PO intakes. Pt reports good appetite; pt denied poor appetite PTA; however per MD note, pt experiencing poor appetite during depressive episode. Discussed food preferences w/ pt. Pt reports UBW of 195-198 lbs. Pt weighed 195 lbs on 6/18. No c/o n/v//d/c; Last BM 6/23 per pt. No chewing/swallowing difficulties reported. Skin intact of PU; no edema per flowsheet.

## 2019-06-24 NOTE — DISCHARGE NOTE BEHAVIORAL HEALTH - FAMILY HISTORY OF PSYCHIATRIC ILLNESS
female, from [facility] and domiciled with her . She has a son-10 month old, a daughter 10 y/o and staying with her G. Parents in [facility]

## 2019-06-24 NOTE — DIETITIAN INITIAL EVALUATION ADULT. - NS FNS WEIGHT USED FOR CALC
NUTRITION INITIAL ASSESSMENT     Pt meets moderate malnutrition criteria.     NUTRITION DIAGNOSIS/PROBLEM:    Inadequate oral intake related to inability to consume sufficient po as evidenced by decreased appetite with intake <75% EEE for greater than 1 mon kg)  Protein: 59-70 grams protein/day (1.5-1.8 grams protein per kg)  Fluid: ~1 ml/kcal or per MD discretion    MONITOR AND EVALUATE/NUTRITION GOALS:    1. PO intake to meet at least 75% patient nutrition prescription  2.  At least 75% intake of oral supple admission

## 2019-06-24 NOTE — DISCHARGE NOTE BEHAVIORAL HEALTH - CARE PROVIDER_API CALL
[facility],   [phone] [facility]; [address]  07/03/2019  Phone: [phone]  Fax: (   )    -  Follow Up Time:

## 2019-06-24 NOTE — DISCHARGE NOTE BEHAVIORAL HEALTH - MEDICATION SUMMARY - MEDICATIONS TO TAKE
I will START or STAY ON the medications listed below when I get home from the hospital:    aspirin 81 mg oral tablet, chewable  -- 1 tab(s) by mouth once a day  -- Indication: For Anti-coag    atorvastatin 40 mg oral tablet  -- 1 tab(s) by mouth once a day (at bedtime)  -- Indication: For HLD    clopidogrel 75 mg oral tablet  -- 1 tab(s) by mouth once a day  -- Indication: For Anti-Coag

## 2019-06-24 NOTE — DISCHARGE NOTE BEHAVIORAL HEALTH - REASON FOR ADMISSION
32 y/o  female from [facility], was BIB/EMS to ED at [facility]  as she was in bed, catatonic for 3 days with limited communication, was not going to work and refused to do or say anything, so her  brought her to the Hospital ED at [place]

## 2019-07-10 PROBLEM — Z00.00 ENCOUNTER FOR PREVENTIVE HEALTH EXAMINATION: Status: ACTIVE | Noted: 2019-07-10

## 2019-07-10 PROCEDURE — 93312 ECHO TRANSESOPHAGEAL: CPT

## 2019-07-10 PROCEDURE — 92610 EVALUATE SWALLOWING FUNCTION: CPT

## 2019-07-10 PROCEDURE — 93306 TTE W/DOPPLER COMPLETE: CPT

## 2019-07-10 PROCEDURE — 82652 VIT D 1 25-DIHYDROXY: CPT

## 2019-07-10 PROCEDURE — 93005 ELECTROCARDIOGRAM TRACING: CPT

## 2019-07-10 PROCEDURE — 82607 VITAMIN B-12: CPT

## 2019-07-10 PROCEDURE — 70544 MR ANGIOGRAPHY HEAD W/O DYE: CPT

## 2019-07-10 PROCEDURE — 81240 F2 GENE: CPT

## 2019-07-10 PROCEDURE — 86146 BETA-2 GLYCOPROTEIN ANTIBODY: CPT

## 2019-07-10 PROCEDURE — 81001 URINALYSIS AUTO W/SCOPE: CPT

## 2019-07-10 PROCEDURE — 80048 BASIC METABOLIC PNL TOTAL CA: CPT

## 2019-07-10 PROCEDURE — 80053 COMPREHEN METABOLIC PANEL: CPT

## 2019-07-10 PROCEDURE — 84100 ASSAY OF PHOSPHORUS: CPT

## 2019-07-10 PROCEDURE — 70450 CT HEAD/BRAIN W/O DYE: CPT

## 2019-07-10 PROCEDURE — 36415 COLL VENOUS BLD VENIPUNCTURE: CPT

## 2019-07-10 PROCEDURE — 84443 ASSAY THYROID STIM HORMONE: CPT

## 2019-07-10 PROCEDURE — 70551 MRI BRAIN STEM W/O DYE: CPT

## 2019-07-10 PROCEDURE — 85303 CLOT INHIBIT PROT C ACTIVITY: CPT

## 2019-07-10 PROCEDURE — 95957 EEG DIGITAL ANALYSIS: CPT

## 2019-07-10 PROCEDURE — 93320 DOPPLER ECHO COMPLETE: CPT

## 2019-07-10 PROCEDURE — 81291 MTHFR GENE: CPT

## 2019-07-10 PROCEDURE — 99285 EMERGENCY DEPT VISIT HI MDM: CPT | Mod: 25

## 2019-07-10 PROCEDURE — 92523 SPEECH SOUND LANG COMPREHEN: CPT

## 2019-07-10 PROCEDURE — 80061 LIPID PANEL: CPT

## 2019-07-10 PROCEDURE — 80307 DRUG TEST PRSMV CHEM ANLYZR: CPT

## 2019-07-10 PROCEDURE — 85260 CLOT FACTOR X STUART-POWER: CPT

## 2019-07-10 PROCEDURE — 82803 BLOOD GASES ANY COMBINATION: CPT

## 2019-07-10 PROCEDURE — 81241 F5 GENE: CPT

## 2019-07-10 PROCEDURE — 92507 TX SP LANG VOICE COMM INDIV: CPT

## 2019-07-10 PROCEDURE — 85300 ANTITHROMBIN III ACTIVITY: CPT

## 2019-07-10 PROCEDURE — 83036 HEMOGLOBIN GLYCOSYLATED A1C: CPT

## 2019-07-10 PROCEDURE — 85307 ASSAY ACTIVATED PROTEIN C: CPT

## 2019-07-10 PROCEDURE — 93325 DOPPLER ECHO COLOR FLOW MAPG: CPT

## 2019-07-10 PROCEDURE — 86147 CARDIOLIPIN ANTIBODY EA IG: CPT

## 2019-07-10 PROCEDURE — 85027 COMPLETE CBC AUTOMATED: CPT

## 2019-07-10 PROCEDURE — 85613 RUSSELL VIPER VENOM DILUTED: CPT

## 2019-07-10 PROCEDURE — 83090 ASSAY OF HOMOCYSTEINE: CPT

## 2019-07-10 PROCEDURE — 95819 EEG AWAKE AND ASLEEP: CPT

## 2019-07-10 PROCEDURE — 84702 CHORIONIC GONADOTROPIN TEST: CPT

## 2019-07-10 PROCEDURE — 85306 CLOT INHIBIT PROT S FREE: CPT

## 2019-07-10 PROCEDURE — 82375 ASSAY CARBOXYHB QUANT: CPT

## 2019-07-10 PROCEDURE — 82306 VITAMIN D 25 HYDROXY: CPT

## 2019-07-10 PROCEDURE — 83735 ASSAY OF MAGNESIUM: CPT

## 2019-07-11 DIAGNOSIS — Z86.69 PERSONAL HISTORY OF OTHER DISEASES OF THE NERVOUS SYSTEM AND SENSE ORGANS: ICD-10-CM

## 2019-07-15 DIAGNOSIS — Z76.89 PERSONS ENCOUNTERING HEALTH SERVICES IN OTHER SPECIFIED CIRCUMSTANCES: ICD-10-CM

## 2019-07-16 ENCOUNTER — APPOINTMENT (OUTPATIENT)
Dept: CARDIOLOGY | Facility: CLINIC | Age: 34
End: 2019-07-16
Payer: MEDICAID

## 2019-07-16 VITALS
WEIGHT: 195 LBS | OXYGEN SATURATION: 99 % | SYSTOLIC BLOOD PRESSURE: 134 MMHG | DIASTOLIC BLOOD PRESSURE: 90 MMHG | HEART RATE: 86 BPM | BODY MASS INDEX: 30.61 KG/M2 | HEIGHT: 67 IN

## 2019-07-16 DIAGNOSIS — Z83.3 FAMILY HISTORY OF DIABETES MELLITUS: ICD-10-CM

## 2019-07-16 DIAGNOSIS — Z82.49 FAMILY HISTORY OF ISCHEMIC HEART DISEASE AND OTHER DISEASES OF THE CIRCULATORY SYSTEM: ICD-10-CM

## 2019-07-16 PROCEDURE — 93000 ELECTROCARDIOGRAM COMPLETE: CPT

## 2019-07-16 PROCEDURE — 99205 OFFICE O/P NEW HI 60 MIN: CPT

## 2019-07-25 ENCOUNTER — APPOINTMENT (OUTPATIENT)
Dept: NEUROLOGY | Facility: CLINIC | Age: 34
End: 2019-07-25
Payer: MEDICAID

## 2019-07-25 ENCOUNTER — LABORATORY RESULT (OUTPATIENT)
Age: 34
End: 2019-07-25

## 2019-07-25 VITALS
DIASTOLIC BLOOD PRESSURE: 89 MMHG | HEIGHT: 67 IN | WEIGHT: 195 LBS | BODY MASS INDEX: 30.61 KG/M2 | HEART RATE: 71 BPM | SYSTOLIC BLOOD PRESSURE: 118 MMHG | TEMPERATURE: 97.3 F | OXYGEN SATURATION: 98 %

## 2019-07-25 PROCEDURE — 99215 OFFICE O/P EST HI 40 MIN: CPT

## 2019-07-25 NOTE — DISCUSSION/SUMMARY
[FreeTextEntry1] : Ms. Junior is a 33 year-old woman with cryptogenic CVA with ASD/PFO.\par \par Plan:\par 1. Given the CVA without other cause will recommend a closure of the PFO/ASD.\par 2. c/w DAPT\par 3. Patient and  agree to the r/a/b/c/i of the procedure and plan.\par 4. Old records requested and reviewed with performing physician.\par 5. Primary and secondary prevention of cardiovascular and related conditions discussed at length, including but not limited to diet and lifestyle modification.\par 6. Patient to return to the office in 1-2 months.\par \par Thank you for allowing me to participate in the care of your patient. If you have any questions, please feel free to contact me at [phone] or via email at [email or web address].\par \par Sincerely,\par \par Brandee Pereira MD FACC

## 2019-07-25 NOTE — HISTORY OF PRESENT ILLNESS
[FreeTextEntry1] : Ms. Junior is a 33 year-old woman with known CVA with evidence of an ASD/PFO. She is referred to be evaulated for closure. She is slowly recovering from her event however her  states that she is slower to respond than previously.

## 2019-07-25 NOTE — PHYSICAL EXAM
[General Appearance - Well Developed] : well developed [Normal Appearance] : normal appearance [Well Groomed] : well groomed [General Appearance - Well Nourished] : well nourished [No Deformities] : no deformities [General Appearance - In No Acute Distress] : no acute distress [Normal Conjunctiva] : the conjunctiva exhibited no abnormalities [Eyelids - No Xanthelasma] : the eyelids demonstrated no xanthelasmas [Normal Oral Mucosa] : normal oral mucosa [No Oral Pallor] : no oral pallor [No Oral Cyanosis] : no oral cyanosis [Normal Jugular Venous A Waves Present] : normal jugular venous A waves present [Normal Jugular Venous V Waves Present] : normal jugular venous V waves present [No Jugular Venous Nuñez A Waves] : no jugular venous nuñez A waves [Heart Rate And Rhythm] : heart rate and rhythm were normal [Heart Sounds] : normal S1 and S2 [Murmurs] : no murmurs present [Respiration, Rhythm And Depth] : normal respiratory rhythm and effort [Exaggerated Use Of Accessory Muscles For Inspiration] : no accessory muscle use [Auscultation Breath Sounds / Voice Sounds] : lungs were clear to auscultation bilaterally [Abdomen Soft] : soft [Abdomen Tenderness] : non-tender [Abdomen Mass (___ Cm)] : no abdominal mass palpated [Abnormal Walk] : normal gait [Gait - Sufficient For Exercise Testing] : the gait was sufficient for exercise testing [Nail Clubbing] : no clubbing of the fingernails [Cyanosis, Localized] : no localized cyanosis [Petechial Hemorrhages (___cm)] : no petechial hemorrhages [Skin Color & Pigmentation] : normal skin color and pigmentation [] : no rash [No Venous Stasis] : no venous stasis [Skin Lesions] : no skin lesions [No Skin Ulcers] : no skin ulcer [No Xanthoma] : no  xanthoma was observed [Oriented To Time, Place, And Person] : oriented to person, place, and time [Affect] : the affect was normal [Mood] : the mood was normal [No Anxiety] : not feeling anxious

## 2019-07-29 ENCOUNTER — INPATIENT (INPATIENT)
Facility: HOSPITAL | Age: 34
LOS: 0 days | Discharge: HOME | DRG: 274 | End: 2019-07-30
Attending: INTERNAL MEDICINE | Admitting: INTERNAL MEDICINE
Payer: MEDICAID

## 2019-07-29 ENCOUNTER — TRANSCRIPTION ENCOUNTER (OUTPATIENT)
Age: 34
End: 2019-07-29

## 2019-07-29 VITALS
RESPIRATION RATE: 18 BRPM | DIASTOLIC BLOOD PRESSURE: 94 MMHG | SYSTOLIC BLOOD PRESSURE: 143 MMHG | OXYGEN SATURATION: 99 % | HEART RATE: 83 BPM | WEIGHT: 190.04 LBS | HEIGHT: 67 IN | TEMPERATURE: 98 F

## 2019-07-29 DIAGNOSIS — Q21.9 CONGENITAL MALFORMATION OF CARDIAC SEPTUM, UNSPECIFIED: ICD-10-CM

## 2019-07-29 LAB
ANION GAP SERPL CALC-SCNC: 12 MMOL/L — SIGNIFICANT CHANGE UP (ref 5–17)
BUN SERPL-MCNC: 11 MG/DL — SIGNIFICANT CHANGE UP (ref 7–23)
CALCIUM SERPL-MCNC: 9.3 MG/DL — SIGNIFICANT CHANGE UP (ref 8.4–10.5)
CHLORIDE SERPL-SCNC: 104 MMOL/L — SIGNIFICANT CHANGE UP (ref 96–108)
CO2 SERPL-SCNC: 22 MMOL/L — SIGNIFICANT CHANGE UP (ref 22–31)
CREAT SERPL-MCNC: 0.66 MG/DL — SIGNIFICANT CHANGE UP (ref 0.5–1.3)
GLUCOSE SERPL-MCNC: 111 MG/DL — HIGH (ref 70–99)
HCG SERPL-ACNC: <2 MIU/ML — SIGNIFICANT CHANGE UP
HCT VFR BLD CALC: 38.7 % — SIGNIFICANT CHANGE UP (ref 34.5–45)
HGB BLD-MCNC: 13.2 G/DL — SIGNIFICANT CHANGE UP (ref 11.5–15.5)
MCHC RBC-ENTMCNC: 30.7 PG — SIGNIFICANT CHANGE UP (ref 27–34)
MCHC RBC-ENTMCNC: 34.2 GM/DL — SIGNIFICANT CHANGE UP (ref 32–36)
MCV RBC AUTO: 89.8 FL — SIGNIFICANT CHANGE UP (ref 80–100)
PLATELET # BLD AUTO: 238 K/UL — SIGNIFICANT CHANGE UP (ref 150–400)
POTASSIUM SERPL-MCNC: 3.9 MMOL/L — SIGNIFICANT CHANGE UP (ref 3.5–5.3)
POTASSIUM SERPL-SCNC: 3.9 MMOL/L — SIGNIFICANT CHANGE UP (ref 3.5–5.3)
RBC # BLD: 4.31 M/UL — SIGNIFICANT CHANGE UP (ref 3.8–5.2)
RBC # FLD: 12 % — SIGNIFICANT CHANGE UP (ref 10.3–14.5)
SODIUM SERPL-SCNC: 138 MMOL/L — SIGNIFICANT CHANGE UP (ref 135–145)
WBC # BLD: 9.2 K/UL — SIGNIFICANT CHANGE UP (ref 3.8–10.5)
WBC # FLD AUTO: 9.2 K/UL — SIGNIFICANT CHANGE UP (ref 3.8–10.5)

## 2019-07-29 PROCEDURE — 93580 TRANSCATH CLOSURE OF ASD: CPT

## 2019-07-29 PROCEDURE — 99152 MOD SED SAME PHYS/QHP 5/>YRS: CPT

## 2019-07-29 PROCEDURE — 93010 ELECTROCARDIOGRAM REPORT: CPT | Mod: 59

## 2019-07-29 RX ORDER — MORPHINE SULFATE 50 MG/1
2 CAPSULE, EXTENDED RELEASE ORAL ONCE
Refills: 0 | Status: DISCONTINUED | OUTPATIENT
Start: 2019-07-29 | End: 2019-07-29

## 2019-07-29 RX ORDER — VANCOMYCIN HCL 1 G
1000 VIAL (EA) INTRAVENOUS ONCE
Refills: 0 | Status: COMPLETED | OUTPATIENT
Start: 2019-07-30 | End: 2019-07-30

## 2019-07-29 RX ORDER — CLOPIDOGREL BISULFATE 75 MG/1
75 TABLET, FILM COATED ORAL DAILY
Refills: 0 | Status: DISCONTINUED | OUTPATIENT
Start: 2019-07-29 | End: 2019-07-30

## 2019-07-29 RX ORDER — ATORVASTATIN CALCIUM 80 MG/1
40 TABLET, FILM COATED ORAL AT BEDTIME
Refills: 0 | Status: DISCONTINUED | OUTPATIENT
Start: 2019-07-29 | End: 2019-07-30

## 2019-07-29 RX ORDER — ACETAMINOPHEN 500 MG
650 TABLET ORAL EVERY 6 HOURS
Refills: 0 | Status: DISCONTINUED | OUTPATIENT
Start: 2019-07-29 | End: 2019-07-30

## 2019-07-29 RX ORDER — ACETAMINOPHEN 500 MG
650 TABLET ORAL ONCE
Refills: 0 | Status: COMPLETED | OUTPATIENT
Start: 2019-07-29 | End: 2019-07-29

## 2019-07-29 RX ORDER — ASPIRIN/CALCIUM CARB/MAGNESIUM 324 MG
81 TABLET ORAL DAILY
Refills: 0 | Status: DISCONTINUED | OUTPATIENT
Start: 2019-07-29 | End: 2019-07-30

## 2019-07-29 RX ADMIN — MORPHINE SULFATE 2 MILLIGRAM(S): 50 CAPSULE, EXTENDED RELEASE ORAL at 21:51

## 2019-07-29 RX ADMIN — CLOPIDOGREL BISULFATE 75 MILLIGRAM(S): 75 TABLET, FILM COATED ORAL at 21:18

## 2019-07-29 RX ADMIN — Medication 650 MILLIGRAM(S): at 18:29

## 2019-07-29 RX ADMIN — Medication 81 MILLIGRAM(S): at 21:18

## 2019-07-29 RX ADMIN — MORPHINE SULFATE 2 MILLIGRAM(S): 50 CAPSULE, EXTENDED RELEASE ORAL at 20:30

## 2019-07-29 RX ADMIN — ATORVASTATIN CALCIUM 40 MILLIGRAM(S): 80 TABLET, FILM COATED ORAL at 21:18

## 2019-07-29 RX ADMIN — Medication 650 MILLIGRAM(S): at 19:38

## 2019-07-29 NOTE — PATIENT PROFILE ADULT - NSPROEXTENSIONSOFSELF_GEN_A_NUR
EMERGENCY DEPARTMENT HISTORY AND PHYSICAL EXAM    Date: 5/6/2018  Patient Name: Dao Vazquez    History of Presenting Illness     Chief Complaint   Patient presents with    Abdominal Pain         History Provided By: Patient    Chief Complaint: abd pain  Duration: 1 Weeks  Timing:  Intermittent  Location: right sided abd  Quality: Sharp and Tightness  Severity: 6 out of 10  Associated Symptoms: nausea    Additional History (Context):     2:32 AM    Dao Vazquez is a 20 y.o. male with no pertinent PMHx, presenting ambulatory to the ED c/o intermittent 6/10 sharp/tight right sided abdominal pain, episodes lasting 3-4 minutes, x ~1 week. Pt states that the episodes have increased in frequency since onset. Pt states that increased physical activity increases the pain. Pt notes an associated symptom of nausea. Pt denies any hx of similar sxs or hx of abdominal surgery. Pt notes a family hx of DM. Pt specifically denies any urinary sxs or vomiting. PCP: Brigida Key MD  Social Hx: + tobacco use (half pk/day), - alcohol use, - illicit drug use    There are no other complaints, changes, or physical findings at this time. Past History     Past Medical History:  Past Medical History:   Diagnosis Date    Sinus infection        Past Surgical History:  Past Surgical History:   Procedure Laterality Date    HX HEENT      tonsillectomy    HX HEENT      sinus polyp    HX HEENT      cellulitis eye       Family History:  No family history on file. Social History:  Social History   Substance Use Topics    Smoking status: Former Smoker    Smokeless tobacco: Not on file    Alcohol use Yes      Comment: social       Allergies: Allergies   Allergen Reactions    Amoxicillin Hives         Review of Systems   Review of Systems   Constitutional: Negative for chills and fever. Respiratory: Negative for shortness of breath. Cardiovascular: Negative for chest pain.    Gastrointestinal: Positive for abdominal pain (right) and nausea. Negative for vomiting. Genitourinary: Negative. Negative for dysuria and hematuria. All other systems reviewed and are negative. Physical Exam     Vitals:    05/06/18 0247 05/06/18 0248 05/06/18 0300 05/06/18 0315   BP: 136/80  137/82 122/74   Pulse:       Resp:       Temp:       SpO2:  99% 99% 99%   Weight:       Height:         Physical Exam   Constitutional: He is oriented to person, place, and time. He appears well-developed and well-nourished. No distress. Comfortable appearing, nontoxic   HENT:   Head: Normocephalic and atraumatic. Right Ear: External ear normal.   Left Ear: External ear normal.   Mouth/Throat: Oropharynx is clear and moist. No oropharyngeal exudate. Eyes: Conjunctivae and EOM are normal. Pupils are equal, round, and reactive to light. No scleral icterus. No pallor   Neck: Normal range of motion. Neck supple. No JVD present. No tracheal deviation present. No thyromegaly present. Cardiovascular: Normal rate, regular rhythm and normal heart sounds. Pulmonary/Chest: Effort normal and breath sounds normal. No stridor. No respiratory distress. Abdominal: Soft. Bowel sounds are normal. He exhibits no distension. There is tenderness (mild TTP to the right flank). There is no rebound and no guarding. Musculoskeletal: Normal range of motion. He exhibits no edema or tenderness. No soft tissue injuries   Lymphadenopathy:     He has no cervical adenopathy. Neurological: He is alert and oriented to person, place, and time. He has normal reflexes. No cranial nerve deficit. Coordination normal.   Skin: Skin is warm and dry. No rash noted. He is not diaphoretic. No erythema. Psychiatric: He has a normal mood and affect. His behavior is normal. Judgment and thought content normal.   Nursing note and vitals reviewed.       Diagnostic Study Results     Labs -     Recent Results (from the past 12 hour(s))   URINALYSIS W/ RFLX MICROSCOPIC    Collection Time: 05/06/18  1:32 AM   Result Value Ref Range    Color DARK YELLOW      Appearance CLEAR      Specific gravity >1.030 (H) 1.005 - 1.030    pH (UA) 6.0 5.0 - 8.0      Protein NEGATIVE  NEG mg/dL    Glucose NEGATIVE  NEG mg/dL    Ketone TRACE (A) NEG mg/dL    Bilirubin NEGATIVE  NEG      Blood NEGATIVE  NEG      Urobilinogen 1.0 0.2 - 1.0 EU/dL    Nitrites NEGATIVE  NEG      Leukocyte Esterase NEGATIVE  NEG     CBC WITH AUTOMATED DIFF    Collection Time: 05/06/18  1:43 AM   Result Value Ref Range    WBC 7.9 4.6 - 13.2 K/uL    RBC 5.25 4.70 - 5.50 M/uL    HGB 15.8 13.0 - 16.0 g/dL    HCT 45.5 36.0 - 48.0 %    MCV 86.7 74.0 - 97.0 FL    MCH 30.1 24.0 - 34.0 PG    MCHC 34.7 31.0 - 37.0 g/dL    RDW 12.3 11.6 - 14.5 %    PLATELET 337 760 - 778 K/uL    MPV 9.4 9.2 - 11.8 FL    NEUTROPHILS 51 40 - 73 %    LYMPHOCYTES 38 21 - 52 %    MONOCYTES 9 3 - 10 %    EOSINOPHILS 2 0 - 5 %    BASOPHILS 0 0 - 2 %    ABS. NEUTROPHILS 4.0 1.8 - 8.0 K/UL    ABS. LYMPHOCYTES 3.0 0.9 - 3.6 K/UL    ABS. MONOCYTES 0.7 0.05 - 1.2 K/UL    ABS. EOSINOPHILS 0.1 0.0 - 0.4 K/UL    ABS. BASOPHILS 0.0 0.0 - 0.06 K/UL    DF AUTOMATED     METABOLIC PANEL, COMPREHENSIVE    Collection Time: 05/06/18  1:43 AM   Result Value Ref Range    Sodium 141 136 - 145 mmol/L    Potassium 3.6 3.5 - 5.5 mmol/L    Chloride 102 100 - 108 mmol/L    CO2 27 21 - 32 mmol/L    Anion gap 12 3.0 - 18 mmol/L    Glucose 95 74 - 99 mg/dL    BUN 18 7.0 - 18 MG/DL    Creatinine 0.94 0.6 - 1.3 MG/DL    BUN/Creatinine ratio 19 12 - 20      GFR est AA >60 >60 ml/min/1.73m2    GFR est non-AA >60 >60 ml/min/1.73m2    Calcium 9.3 8.5 - 10.1 MG/DL    Bilirubin, total 0.5 0.2 - 1.0 MG/DL    ALT (SGPT) 46 16 - 61 U/L    AST (SGOT) 22 15 - 37 U/L    Alk.  phosphatase 76 45 - 117 U/L    Protein, total 7.7 6.4 - 8.2 g/dL    Albumin 4.3 3.4 - 5.0 g/dL    Globulin 3.4 2.0 - 4.0 g/dL    A-G Ratio 1.3 0.8 - 1.7         Radiologic Studies -   XR ABD ACUTE W 1 V CHEST    (Results Pending)     3:51 AM  RADIOLOGY FINDINGS  Abd/chest X-ray shows nml chest. Nonobstructive bowel gas pattern. Prominent amount of stool in the RUQ, without obstruction. Pending review by Radiologist  Recorded by ANDERSON Mercadoibjaun, as dictated by Sergey Estrada. Cheikh Randhawa MD.       Medical Decision Making   I am the first provider for this patient. I reviewed the vital signs, available nursing notes, past medical history, past surgical history, family history and social history. Vital Signs-Reviewed the patient's vital signs. Pulse Oximetry Analysis - 100% on RA     Records Reviewed: Nursing Notes and Old Medical Records    Provider Notes (Medical Decision Making): constipation, muscle strain, most likely bladder infection, pyelo, or kidney stone. Infection is also unlikely. Appy, biliary disease or bowel obstruction is far less likely. PROCEDURES:  Procedures    MEDICATIONS GIVEN IN THE ED:  Medications   ketorolac (TORADOL) injection 30 mg (30 mg IntraVENous Given 5/6/18 0345)   dicyclomine (BENTYL) capsule 20 mg (20 mg Oral Given 5/6/18 0345)        ED COURSE:   2:32 AM   Initial assessment performed. PROGRESS NOTE:  3:51 AM  Pt and/or family have been updated on their results. Pt and/or pt's family are aware of the plan of care and are in agreement. Written by ANDERSON Batres, as dictated by Aurelio Randhawa MD.      Diagnosis and Disposition       DISCHARGE NOTE:  3:53 AM  The patient is ready for discharge. The patient's signs, symptoms, diagnosis, and discharge instructions have been discussed and the patient and/or family has conveyed their understanding. The patient and/or family is to follow up as recommended or return to the ER should their symptoms worsen. Plan has been discussed and the patient and/or family is in agreement. Written by ANDERSON Batres, as dictated by Aurelio Randhawa MD.     CLINICAL IMPRESSION:  1. Right sided abdominal pain        PLAN:  1. D/C Home  2. Current Discharge Medication List      START taking these medications    Details   metoclopramide HCl (REGLAN) 10 mg tablet Take 1 Tab by mouth every six (6) hours as needed for Pain or Nausea. Qty: 20 Tab, Refills: 0      dicyclomine (BENTYL) 20 mg tablet Take 1 Tab by mouth every six (6) hours for 20 doses. Qty: 20 Tab, Refills: 0           3. Follow-up Information     Follow up With Details Comments 145 Jaylon Hancock MD Schedule an appointment as soon as possible for a visit for primary care follow up, as needed Akhil Gutierrez 125      [facility] EMERGENCY DEPT  As needed, If symptoms worsen [address] Reyes  [address] [id number] [phone]        _______________________________    Attestations: This note is prepared by Ratna Booth, acting as Scribe for Paul Morrison MD.    Rosalind. Lee Morrison MD:  The scribe's documentation has been prepared under my direction and personally reviewed by me in its entirety.   I confirm that the note above accurately reflects all work, treatment, procedures, and medical decision making performed by me.  _______________________________ none

## 2019-07-29 NOTE — PROGRESS NOTE ADULT - SUBJECTIVE AND OBJECTIVE BOX
Removal of Right Suture    Pulses in the right lower extremity are palpable. The patient was placed in the supine position. The insertion site was identified and the sutures were removed per protocol.  Direct pressure was applied for 5 minutes.     Monitoring of the right groin and both lower extremities including neuro-vascular checks and vital signs every 15 minutes x 4, then every 30 minutes x 2, then every 1 hour was ordered.    Complications: None    Comments:

## 2019-07-29 NOTE — CHART NOTE - NSCHARTNOTEFT_GEN_A_CORE
s/p PFO closure   suture right groin to be removed 2015  Vanco X1 dose post   Echo in AM   Possible discharge AM   discussed with Dr. Pereira

## 2019-07-29 NOTE — H&P CARDIOLOGY - HISTORY OF PRESENT ILLNESS
33 year old English female with pmhx of CVA (June 2019, with residual memory loss) presents for PFO closure. The patient states last month she experienced the CVA. Her  noted that she was not getting out of bed and she proceeded to [facility]. An echo was performed at that time and she was noted to have PFO. Upon discharge from [place] (after 3 weeks) she continues to experience palpitations and dyspnea.   She was evaluated by Dr. Pereira and scheduled for PFO closure. She currently denies chest pain, arrythmias or syncope. 33 year old Ecuad[place] female with pmhx of CVA (June 4, 2019, with residual memory loss)  no implantable devices presents for PFO/ASD closure. The patient states last month she experienced the CVA. Her  noted that she was not getting out of bed and she proceeded to [facility]. An echo was performed at that time and she was noted to have PFO/ASD. Upon discharge from [place] (after 3 weeks) she continues to experience palpitations and dyspnea.   She was evaluated by Dr. Pereira and scheduled for PFO closure. She currently denies chest pain, arrythmias or syncope.

## 2019-07-30 ENCOUNTER — TRANSCRIPTION ENCOUNTER (OUTPATIENT)
Age: 34
End: 2019-07-30

## 2019-07-30 VITALS
RESPIRATION RATE: 16 BRPM | TEMPERATURE: 98 F | DIASTOLIC BLOOD PRESSURE: 66 MMHG | SYSTOLIC BLOOD PRESSURE: 122 MMHG | HEART RATE: 77 BPM | OXYGEN SATURATION: 98 %

## 2019-07-30 LAB
ANION GAP SERPL CALC-SCNC: 10 MMOL/L — SIGNIFICANT CHANGE UP (ref 5–17)
BUN SERPL-MCNC: 9 MG/DL — SIGNIFICANT CHANGE UP (ref 7–23)
CALCIUM SERPL-MCNC: 8.4 MG/DL — SIGNIFICANT CHANGE UP (ref 8.4–10.5)
CHLORIDE SERPL-SCNC: 105 MMOL/L — SIGNIFICANT CHANGE UP (ref 96–108)
CO2 SERPL-SCNC: 22 MMOL/L — SIGNIFICANT CHANGE UP (ref 22–31)
CREAT SERPL-MCNC: 0.63 MG/DL — SIGNIFICANT CHANGE UP (ref 0.5–1.3)
GLUCOSE SERPL-MCNC: 162 MG/DL — HIGH (ref 70–99)
HCT VFR BLD CALC: 37.4 % — SIGNIFICANT CHANGE UP (ref 34.5–45)
HGB BLD-MCNC: 12.5 G/DL — SIGNIFICANT CHANGE UP (ref 11.5–15.5)
MCHC RBC-ENTMCNC: 30.4 PG — SIGNIFICANT CHANGE UP (ref 27–34)
MCHC RBC-ENTMCNC: 33.4 GM/DL — SIGNIFICANT CHANGE UP (ref 32–36)
MCV RBC AUTO: 90.8 FL — SIGNIFICANT CHANGE UP (ref 80–100)
PLATELET # BLD AUTO: 222 K/UL — SIGNIFICANT CHANGE UP (ref 150–400)
POTASSIUM SERPL-MCNC: 3.7 MMOL/L — SIGNIFICANT CHANGE UP (ref 3.5–5.3)
POTASSIUM SERPL-SCNC: 3.7 MMOL/L — SIGNIFICANT CHANGE UP (ref 3.5–5.3)
RBC # BLD: 4.12 M/UL — SIGNIFICANT CHANGE UP (ref 3.8–5.2)
RBC # FLD: 12.2 % — SIGNIFICANT CHANGE UP (ref 10.3–14.5)
SODIUM SERPL-SCNC: 137 MMOL/L — SIGNIFICANT CHANGE UP (ref 135–145)
WBC # BLD: 8.8 K/UL — SIGNIFICANT CHANGE UP (ref 3.8–10.5)
WBC # FLD AUTO: 8.8 K/UL — SIGNIFICANT CHANGE UP (ref 3.8–10.5)

## 2019-07-30 PROCEDURE — 93308 TTE F-UP OR LMTD: CPT | Mod: 26

## 2019-07-30 PROCEDURE — 93321 DOPPLER ECHO F-UP/LMTD STD: CPT | Mod: 26

## 2019-07-30 PROCEDURE — 99238 HOSP IP/OBS DSCHRG MGMT 30/<: CPT

## 2019-07-30 RX ORDER — CLOPIDOGREL BISULFATE 75 MG/1
1 TABLET, FILM COATED ORAL
Qty: 90 | Refills: 1
Start: 2019-07-30 | End: 2020-01-25

## 2019-07-30 RX ORDER — ATORVASTATIN CALCIUM 80 MG/1
1 TABLET, FILM COATED ORAL
Qty: 30 | Refills: 0
Start: 2019-07-30 | End: 2019-08-28

## 2019-07-30 RX ORDER — ACETAMINOPHEN 500 MG
2 TABLET ORAL
Qty: 0 | Refills: 0 | DISCHARGE
Start: 2019-07-30

## 2019-07-30 RX ORDER — SIMETHICONE 80 MG/1
80 TABLET, CHEWABLE ORAL
Refills: 0 | Status: DISCONTINUED | OUTPATIENT
Start: 2019-07-30 | End: 2019-07-30

## 2019-07-30 RX ORDER — ASPIRIN/CALCIUM CARB/MAGNESIUM 324 MG
1 TABLET ORAL
Qty: 90 | Refills: 2
Start: 2019-07-30 | End: 2020-04-24

## 2019-07-30 RX ADMIN — Medication 250 MILLIGRAM(S): at 01:45

## 2019-07-30 RX ADMIN — Medication 81 MILLIGRAM(S): at 05:42

## 2019-07-30 RX ADMIN — Medication 650 MILLIGRAM(S): at 09:34

## 2019-07-30 RX ADMIN — Medication 650 MILLIGRAM(S): at 10:40

## 2019-07-30 RX ADMIN — CLOPIDOGREL BISULFATE 75 MILLIGRAM(S): 75 TABLET, FILM COATED ORAL at 05:42

## 2019-07-30 NOTE — DISCHARGE NOTE PROVIDER - NSDCPNSUBOBJ_GEN_ALL_CORE
Patient is a 33y old  Female who presents with a chief complaint of PFO closure (2019 03:09)                    Allergies        penicillin (Rash)        Intolerances                Medications:    acetaminophen   Tablet .. 650 milliGRAM(s) Oral every 6 hours PRN    aspirin  chewable 81 milliGRAM(s) Oral daily    atorvastatin 40 milliGRAM(s) Oral at bedtime    clopidogrel Tablet 75 milliGRAM(s) Oral daily            Vitals:    T(C): 36.8 (19 @ 21:00), Max: 36.9 (19 @ 12:16)    HR: 63 (19 @ 23:45) (63 - 83)    BP: 99/59 (19 @ 23:45) (99/59 - 143/94)    BP(mean): 110 (19 @ 12:16) (110 - 110)    RR: 16 (19 @ 23:45) (16 - 18)    SpO2: 99% (19 @ 23:45) (99% - 100%)    Wt(kg): --    Daily Height in cm: 170.18 (2019 12:16)      Daily Weight in k.2 (2019 12:16)    I&O's Summary        2019 07:01  -  2019 05:17    --------------------------------------------------------    IN: 180 mL / OUT: 0 mL / NET: 180 mL                    Physical Exam:    Appearance: Normal    Eyes: PERRL, EOMI    HENT: Normal oral mus[place]a, NC/AT    Cardiovascular: S1S2, RRR, No M/R/G, no JVD, No Lower extremity edema    Procedural Access Site: No hematoma, Non-tender to palpation, 2+ pulse, No bruit, No Ecchymosis    Respiratory: Clear to auscultation bilaterally    Gastrointestinal: Soft, Non tender, Normal Bowel Sounds    Musculoskeletal: No clubbing, No joint deformity     Neurologic: Non-focal    Lymphatic: No lymphadenopathy    Psychiatry: AAOx3, Mood & affect appropriate    Skin: No rashes, No ecchymoses, No cyanosis                137  |  105  |  9     ----------------------------<  162<H>    3.7   |  22  |  0.63        Ca    8.4      2019 03:09                            Lipid panel     Hgb A1c                             12.5     8.8   )-----------( 222      ( 2019 03:09 )               37.4                 ECG: SR 78 bpm        Cath: PFO closure        Imaging:        Interpretation of Telemetry:                PFO CLOSURE    Monitor groin site for swelling, bleeding    Continue ASA, Plavix    Confirm TTE results prior to discharge        HTN    Continue antihypertensive medications with hold parameters        HLD    continue statin    confirm lipid panel results

## 2019-07-30 NOTE — DISCHARGE NOTE PROVIDER - CARE PROVIDERS DIRECT ADDRESSES
,[email or web address]

## 2019-07-30 NOTE — DISCHARGE NOTE PROVIDER - NSDCFUSCHEDAPPT_GEN_ALL_CORE_FT
PRAFUL BOX ; 08/06/2019 ; NPP Neuro [address] [facility] [address] ; 08/06/2019 ; NPP Neuro [address] [facility] [address] ; 08/06/2019 ; NPP Neuro [address] [facility] [address] ; 08/06/2019 ; NPP Neuro [address] [facility]

## 2019-07-30 NOTE — CHART NOTE - NSCHARTNOTEFT_GEN_A_CORE
s/p PFO closure    Tolerated procedure well  VSS  Tele SR 60-70s  TTE - device well seated. No residual shunting     A+O x 4  no facial weakness, no slurred speech, no motor deficits, no visual deficits  Pulm CTA  CV S1S2 RRR  abd  soft, non tender +BS  right groin site stable- no hematoma or bleeding   +DP    patient reports recurrent headaches and fatigue since stroke last June  no associated symptoms. HA are relieved with tylenol  Is followed by Dr Garcia neurology (has next apt scheduled on 8/6/19)    case discussed w Dr Pereira. Cleared for discharge to home  Follow up with Dr Pereira in 2 weeks  Rpt ECHO in 4 weeks  Antibiotic prophylaxis x 6 months  ASA/Plavix/Statin on discharge  Teaching completed

## 2019-07-30 NOTE — DISCHARGE NOTE PROVIDER - NSDCFUADDAPPT_GEN_ALL_CORE_FT
see Dr Pereira in 2 weeks  will need repeat ECHO in 1 month after discharge    Follow up with Neurology as scheduled see Dr Pereira in 2 weeks  will need repeat ECHO in 1 month after discharge    Follow up with Neurology as scheduled on 8/6/19 at 09:00 AM see Dr Pereira in 2 weeks  will need repeat ECHO in 1 month after discharge    Follow up with Neurology as scheduled on 8/6/19 at 09:00 AM    Elevated blood sugars. please follow up same with your primary MD

## 2019-07-30 NOTE — DISCHARGE NOTE PROVIDER - HOSPITAL COURSE
HPI:    33 year old Macedonian female with pmhx of CVA (June 4, 2019, with residual memory loss)  no implantable devices presents for PFO/ASD closure. The patient states last month she experienced the CVA. Her  noted that she was not getting out of bed and she proceeded to [facility]. An echo was performed at that time and she was noted to have PFO/ASD. Upon discharge from [place] (after 3 weeks) she continues to experience palpitations and dyspnea.   She was evaluated by Dr. Pereira and scheduled for PFO closure. She currently denies chest pain, arrythmias or syncope. (29 Jul 2019 12:16).        7/29 placement of PFO closure device. Right groin site without swelling, bleeding. HPI:    33 year old Slovak female with pmhx of CVA (June 4, 2019, with residual memory loss)  no implantable devices presents for PFO/ASD closure. The patient states last month she experienced the CVA. Her  noted that she was not getting out of bed and she proceeded to [facility]. An echo was performed at that time and she was noted to have PFO/ASD. Upon discharge from [place] (after 3 weeks) she continues to experience palpitations and dyspnea.   She was evaluated by Dr. Pereira and scheduled for PFO closure. She currently denies chest pain, arrythmias or syncope. (29 Jul 2019 12:16).        7/29 placement of PFO closure device. Right groin site without swelling, bleeding.    Ambulating around unit without complaint HPI:    33 year old Moldovan female with pmhx of CVA (June 4, 2019, with residual memory loss)  no implantable devices presents for PFO/ASD closure. The patient states last month she experienced the CVA. Her  noted that she was not getting out of bed and she proceeded to [facility]. An echo was performed at that time and she was noted to have PFO/ASD. Upon discharge from [place] (after 3 weeks) she continues to experience palpitations and dyspnea.   She was evaluated by Dr. Pereira and scheduled for PFO closure. She currently denies chest pain, arrythmias or syncope. (29 Jul 2019 12:16).        7/29 placement of PFO closure device. Right groin site without swelling, bleeding.    Ambulating around unit without complaint    Elevated A1c - instructed on low salt, low fat, diabetic diet.  Diabetic teaching done. Instructed to follow up with primary care doctor HPI:    33 year old Yemeni female with pmhx of CVA (June 4, 2019, with residual memory loss)  no implantable devices presents for PFO/ASD closure. The patient states last month she experienced the CVA. Her  noted that she was not getting out of bed and she proceeded to [facility]. An echo was performed at that time and she was noted to have PFO/ASD. Upon discharge from [place] (after 3 weeks) she continues to experience palpitations and dyspnea.   She was evaluated by Dr. Pereira and scheduled for PFO closure. She currently denies chest pain, arrythmias or syncope. (29 Jul 2019 12:16).        7/29 placement of PFO closure device. Right groin site without swelling, bleeding.    Ambulating around unit without complaint        TTE 7/30-         Elevated A1c - instructed on low salt, low fat, diabetic diet.  Diabetic teaching done. Instructed to follow up with primary care doctor HPI:    33 year old [place] female with pmhx of CVA (June 4, 2019, with residual memory loss)  no implantable devices presents for PFO/ASD closure. The patient states last month she experienced the CVA. Her  noted that she was not getting out of bed and she proceeded to [facility]. An echo was performed at that time and she was noted to have PFO/ASD. Upon discharge from [place] (after 3 weeks) she continues to experience palpitations and dyspnea.   She was evaluated by Dr. Pereira and scheduled for PFO closure. She currently denies chest pain, arrythmias or syncope. (29 Jul 2019 12:16).        7/29 placement of PFO closure device. Right groin site without swelling, bleeding.    Ambulating around unit without complaint        TTE 7/30-     1. An atrial septal closure device is seen on the    interatrial septum and appears well-seated with no residual    interatrial flow by color Doppler.    2. Endocardium not well visualized; grossly normal left    ventricular systolic function.    3. Normal pericardium with no pericardial effusion            Elevated A1c - instructed on low salt, low fat, diabetic diet.  Diabetic teaching done. Instructed to follow up with primary care doctor HPI:    33 year old Citizen of [place] female with pmhx of CVA (June 4, 2019, with residual memory loss)  no implantable devices presents for PFO/ASD closure. The patient states last month she experienced the CVA. Her  noted that she was not getting out of bed and she proceeded to [facility]. An echo was performed at that time and she was noted to have PFO/ASD. Upon discharge from [place] (after 3 weeks) she continues to experience palpitations and dyspnea.   She was evaluated by Dr. Pereira and scheduled for PFO closure. She currently denies chest pain, arrythmias or syncope. (29 Jul 2019 12:16).        7/29 placement of PFO closure device. Right groin site without swelling, bleeding.    Ambulating around unit without complaint        TTE 7/30-     1. An atrial septal closure device is seen on the    interatrial septum and appears well-seated with no residual    interatrial flow by color Doppler.    2. Endocardium not well visualized; grossly normal left    ventricular systolic function.    3. Normal pericardium with no pericardial effusion            Elevated A1c - instructed on low salt, low fat, diabetic diet. Pre DM teaching done. Instructed to follow up with primary care doctor

## 2019-07-30 NOTE — DISCHARGE NOTE NURSING/CASE MANAGEMENT/SOCIAL WORK - NSDCFUADDAPPT_GEN_ALL_CORE_FT
see Dr Pereira in 2 weeks  will need repeat ECHO in 1 month after discharge    Follow up with Neurology as scheduled on 8/6/19 at 09:00 AM    Elevated blood sugars. please follow up same with your primary MD

## 2019-07-30 NOTE — DISCHARGE NOTE NURSING/CASE MANAGEMENT/SOCIAL WORK - NSDCDPATPORTLINK_GEN_ALL_CORE
You can access the [facility] Patient Portal, offered by [facility], by registering with the following website: [email or web address]

## 2019-07-30 NOTE — DISCHARGE NOTE PROVIDER - CARE PROVIDER_API CALL
Brandee Pereira)  Cardiology; Internal Medicine; Interventional Cardiology  [facility] Dept of Cardiology, [address] [place] [address]  Phone: [phone]  Fax: [phone]  Follow Up Time: Brandee Pereira)  Cardiology; Internal Medicine; Interventional Cardiology  [facility] Dept of Cardiology, [address] [place] [address]  Phone: [phone]  Fax: [phone]  Follow Up Time:     Rosemarie Cao)  Clinical Neurophysiology; Neurology  [address] [place] [address], [address] [place] [address]  Phone: [phone]  Fax: [phone]  Follow Up Time:

## 2019-07-30 NOTE — DISCHARGE NOTE NURSING/CASE MANAGEMENT/SOCIAL WORK - NSDCPEPTSTRK_GEN_ALL_CORE
Need for follow up after discharge/Prescribed medications/Stroke warning signs and symptoms/Signs and symptoms of stroke/Call 911 for stroke/Risk factors for stroke/Stroke education booklet/Stroke support groups for patients, families, and friends

## 2019-07-30 NOTE — DISCHARGE NOTE PROVIDER - NSDCCPTREATMENT_GEN_ALL_CORE_FT
PRINCIPAL PROCEDURE  Procedure: Repair of patent foramen ovale (PFO) in adult  Findings and Treatment: PFO clsoure device placed Alert and oriented, no focal deficits, no motor or sensory deficits.

## 2019-07-30 NOTE — PROGRESS NOTE ADULT - SUBJECTIVE AND OBJECTIVE BOX
2- Hours Post Removal of Right Femoral Suture Assessment of Access Site    Vital signs are stable, neuro-vascular status of the lower extremities is intact, stable and there is no evidence of hematoma on the right lower extremities.     Complications: None    Comments:

## 2019-07-30 NOTE — DISCHARGE NOTE PROVIDER - NSDCCPCAREPLAN_GEN_ALL_CORE_FT
PRINCIPAL DISCHARGE DIAGNOSIS  Diagnosis: PFO (patent foramen ovale)  Assessment and Plan of Treatment: Do not stop your aspirin or Plavix unless instructed to do so by your cardiologist, they help keep your stented arteries open.   No heavy lifting, strenuous activity, bending, straining, or unnecessary stair climbing for 2 weeks. No driving for 2 days. You may shower 24 hours following the procedure but avoid baths/swimming for 1 week. Check your groin site for bleeding and/or swelling daily following procedure and call your doctor immediately if it occurs or if you experience increased pain at the site. Follow up with your cardiologist in 1-2 weeks. You may call [facility] if you have any questions/concerns regarding your procedure [phone]. PRINCIPAL DISCHARGE DIAGNOSIS  Diagnosis: PFO (patent foramen ovale)  Assessment and Plan of Treatment: Do not stop your aspirin or Plavix unless instructed to do so by your cardiologist. Continue  Plavix x 6 months. Continue aspirin as instructed by neurology and cardiology  No heavy lifting, strenuous activity, bending, straining, or unnecessary stair climbing for 2 weeks. No driving for 2 days. You may shower 24 hours following the procedure but avoid baths/swimming for 1 week. Check your groin site for bleeding and/or swelling daily following procedure and call your doctor immediately if it occurs or if you experience increased pain at the site. Follow up with your cardiologist DR GOODEN  in 2 weeks. You may call [facility] if you have any questions/concerns regarding your procedure [phone].  You will need ECHOCARDIOGRAM in 4 to 6 weeks with DR GOODEN.   Antibiotic prophylaxis x 6 months

## 2019-08-01 PROCEDURE — G9001: CPT

## 2019-08-01 PROCEDURE — G9005: CPT

## 2019-08-05 PROBLEM — I63.9 CEREBRAL INFARCTION, UNSPECIFIED: Chronic | Status: ACTIVE | Noted: 2019-07-29

## 2019-08-06 ENCOUNTER — LABORATORY RESULT (OUTPATIENT)
Age: 34
End: 2019-08-06

## 2019-08-06 ENCOUNTER — APPOINTMENT (OUTPATIENT)
Dept: NEUROLOGY | Facility: CLINIC | Age: 34
End: 2019-08-06
Payer: MEDICAID

## 2019-08-06 VITALS
OXYGEN SATURATION: 99 % | HEIGHT: 67 IN | SYSTOLIC BLOOD PRESSURE: 116 MMHG | DIASTOLIC BLOOD PRESSURE: 85 MMHG | BODY MASS INDEX: 28.88 KG/M2 | HEART RATE: 83 BPM | WEIGHT: 184 LBS

## 2019-08-06 DIAGNOSIS — G43.611: ICD-10-CM

## 2019-08-06 DIAGNOSIS — I63.9: ICD-10-CM

## 2019-08-06 DIAGNOSIS — F39 UNSPECIFIED MOOD [AFFECTIVE] DISORDER: ICD-10-CM

## 2019-08-06 PROCEDURE — 99214 OFFICE O/P EST MOD 30 MIN: CPT

## 2019-08-06 NOTE — PHYSICAL EXAM
[FreeTextEntry1] : Alert, awake, oriented in time, place and person with normal memory and language (fluent speech, normal repetition, comprehension and naming). Pupils are equal and reactive to light and accommodation. Fundus discs and retina are normal. Visual Fields are full upon confrontation testing. Extraocular movements are full in all gaze directions without nystagmus. Optokinetic nystagmus is normal with tape running  either direction. Facial sensations, jaw strength, facial movements, hearing, palate, neck, shoulder and tongue are normal and symmetrical. Neck is supple. Tone, bulk, strength, muscle spindle reflexes, in the extremities are normal. Sensations for touch, pin and vibration are normal. Finger-to-nose and heel-to-shin are normal. Rapid alternating movements are normal. There is no tremor. Romberg is negative. Tandem gait, heel-walking and toe-walking are normal. Able to draw a cube and clock placing hands of the clock correctly.\par

## 2019-08-06 NOTE — REVIEW OF SYSTEMS
[Feeling Poorly] : feeling poorly [Feeling Tired] : feeling tired [Confused or Disoriented] : confusion [Recent Weight Loss (___ Lbs)] : recent [unfilled] ~Ulb weight loss [Memory Lapses or Loss] : memory loss [Changed Thought Patterns] : changed thought patterns [Difficulty with Language] : ~M difficulty with language [Tingling] : tingling [Numbness] : numbness [Abnormal Sensation] : an abnormal sensation [Hypersensitivity] : hypersensitivity [Migraine Headache] : migraine headaches [Sleep Disturbances] : sleep disturbances [Depression] : depression [Emotional Problems] : emotional problems [Change In Personality] : personality change [Fever] : no fever [Recent Weight Gain (___ Lbs)] : no recent weight gain [Chills] : no chills [Decr. Concentrating Ability] : no decrease in concentrating ability [Repeating Questions] : no repeated questioning about recent events [Hand Weakness] : no hand weakness [Leg Weakness] : no leg weakness [Difficulties in Speech] : no speech difficulties [Seizures] : no convulsions [Dizziness] : no dizziness [Fainting] : no fainting [Lightheadedness] : no lightheadedness [Vertigo] : no vertigo [Difficulty Walking] : no difficulty walking [Inability to Walk] : able to walk [Ataxia] : no ataxia [Frequent Falls] : not falling [Limping] : not limping [Suicidal] : not suicidal [Anxiety] : no anxiety [Shortness Of Breath] : no shortness of breath [Breast Pain] : no breast pain

## 2019-08-06 NOTE — HISTORY OF PRESENT ILLNESS
[FreeTextEntry1] : Admitted to [facility] June 9 with difficulty speaking. 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 \par  notes irritability anger and violent behavior against him; she reports he provokes her but admits he has not been violent against her.\par  reports errors in use of words to describe objects particularly when in a hurry. But there are no significant problems in comprehension. She admits feeling tired all day and staying up or multiple arousals at night. However she also admits keeping the TV on at night (because of concern

## 2019-08-06 NOTE — REVIEW OF SYSTEMS
[Feeling Poorly] : feeling poorly [Feeling Tired] : feeling tired [Recent Weight Loss (___ Lbs)] : recent [unfilled] ~Ulb weight loss [Confused or Disoriented] : confusion [Memory Lapses or Loss] : memory loss [Difficulty with Language] : ~M difficulty with language [Changed Thought Patterns] : changed thought patterns [Numbness] : numbness [Tingling] : tingling [Abnormal Sensation] : an abnormal sensation [Hypersensitivity] : hypersensitivity [Migraine Headache] : migraine headaches [Sleep Disturbances] : sleep disturbances [Depression] : depression [Emotional Problems] : emotional problems [Change In Personality] : personality change [Fever] : no fever [Recent Weight Gain (___ Lbs)] : no recent weight gain [Chills] : no chills [Decr. Concentrating Ability] : no decrease in concentrating ability [Hand Weakness] : no hand weakness [Repeating Questions] : no repeated questioning about recent events [Difficulties in Speech] : no speech difficulties [Leg Weakness] : no leg weakness [Seizures] : no convulsions [Fainting] : no fainting [Dizziness] : no dizziness [Lightheadedness] : no lightheadedness [Vertigo] : no vertigo [Ataxia] : no ataxia [Difficulty Walking] : no difficulty walking [Inability to Walk] : able to walk [Frequent Falls] : not falling [Limping] : not limping [Suicidal] : not suicidal [Shortness Of Breath] : no shortness of breath [Breast Pain] : no breast pain [Anxiety] : no anxiety

## 2019-08-06 NOTE — ASSESSMENT
[FreeTextEntry1] : Stable; Treat migraine that appears to be turning out to be the only explanation for the stroke in a young adult. But, because of the combination of pscychosis and stroke in a young woman with no apparent other explanations, advise paraneoplastic evaluation. The last request and blood draw is reportedly lost, and will bee repeated. \par Begin zonisamide for migraine prophylaxis beginning with 50 mg at bed time to reduce side effects.

## 2019-08-06 NOTE — ASSESSMENT
[FreeTextEntry1] : Stable; Treat migraine that appears to be turning out to be the only explanation for the stroke in a young adult. But, because of the combination of psychosis and stroke in a young woman with no apparent other explanations, advise paraneoplastic evaluation. The last request and blood draw is reportedly lost, and will bee repeated. \par Begin zonisamide for migraine prophylaxis beginning with 50 mg at bed time to reduce side effects.

## 2019-08-06 NOTE — HISTORY OF PRESENT ILLNESS
[FreeTextEntry1] : Admitted to [facility] June 9 with difficulty speaking. 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 \par  notes irritability anger and violent behavior against him; she reports he provokes her but admits he has not been violent against her.\par  reports errors in use of words to describe objects particularly when in a hurry. But there are no significant problems in comprehension. She admits feeling tired all day and staying up or multiple arousals at night. However she also admits keeping the TV on at night (because of concern

## 2019-08-27 ENCOUNTER — NON-APPOINTMENT (OUTPATIENT)
Age: 34
End: 2019-08-27

## 2019-08-27 ENCOUNTER — APPOINTMENT (OUTPATIENT)
Dept: CARDIOLOGY | Facility: CLINIC | Age: 34
End: 2019-08-27
Payer: MEDICAID

## 2019-08-27 VITALS — HEART RATE: 83 BPM | SYSTOLIC BLOOD PRESSURE: 117 MMHG | DIASTOLIC BLOOD PRESSURE: 82 MMHG | OXYGEN SATURATION: 98 %

## 2019-08-27 DIAGNOSIS — I63.9 CEREBRAL INFARCTION, UNSPECIFIED: ICD-10-CM

## 2019-08-27 PROCEDURE — 93000 ELECTROCARDIOGRAM COMPLETE: CPT

## 2019-08-27 PROCEDURE — 99214 OFFICE O/P EST MOD 30 MIN: CPT

## 2019-09-12 ENCOUNTER — OTHER (OUTPATIENT)
Age: 34
End: 2019-09-12

## 2019-10-08 ENCOUNTER — APPOINTMENT (OUTPATIENT)
Dept: NEUROLOGY | Facility: CLINIC | Age: 34
End: 2019-10-08
Payer: MEDICAID

## 2019-10-08 VITALS
HEART RATE: 87 BPM | DIASTOLIC BLOOD PRESSURE: 80 MMHG | BODY MASS INDEX: 28.88 KG/M2 | WEIGHT: 184 LBS | OXYGEN SATURATION: 99 % | SYSTOLIC BLOOD PRESSURE: 115 MMHG | HEIGHT: 67 IN

## 2019-10-08 DIAGNOSIS — L65.9 NONSCARRING HAIR LOSS, UNSPECIFIED: ICD-10-CM

## 2019-10-08 PROCEDURE — 99214 OFFICE O/P EST MOD 30 MIN: CPT

## 2019-10-08 RX ORDER — ZONISAMIDE 50 MG/1
50 CAPSULE ORAL TWICE DAILY
Qty: 360 | Refills: 3 | Status: DISCONTINUED | COMMUNITY
Start: 2019-08-06 | End: 2019-10-08

## 2019-10-08 NOTE — PHYSICAL EXAM
[FreeTextEntry1] : Alert, awake, oriented in time, place and person with Memory 1/5 in 5 minutes and normal language (fluent speech, normal repetition, comprehension and naming). Pupils are equal and reactive to light and accommodation. Fundus discs and retina are normal. Visual Fields are full upon confrontation testing. Extraocular movements are full in all gaze directions without nystagmus. Optokinetic nystagmus is normal with tape running  either direction. Facial sensations, jaw strength, facial movements, hearing, palate, neck, shoulder and tongue are normal and symmetrical. Neck is supple. Tone, bulk, strength, muscle spindle reflexes, in the extremities are normal. Sensations for touch, pin and vibration are normal. Finger-to-nose and heel-to-shin are normal. Rapid alternating movements are normal. There is no tremor. Romberg is negative. Tandem gait, heel-walking and toe-walking are normal. Able to draw a cube and clock placing hands of the clock correctly.\par \par

## 2019-10-08 NOTE — ASSESSMENT
[FreeTextEntry1] : Stable; Treat migraine that appears to be turning out to be the only explanation for the stroke in a young adult. Increase zonisamide \par Minoxidil for hair loss

## 2019-10-08 NOTE — DISCUSSION/SUMMARY
[FreeTextEntry1] : Paraneoplastic profile normal; advised to read and practise SAT tests to improve memory and prepare for college

## 2019-10-08 NOTE — HISTORY OF PRESENT ILLNESS
[FreeTextEntry1] : Admitted to [facility] June 9 with difficulty speaking. 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 \par  notes irritability anger and violent behavior against him; she reports he provokes her but admits he has not been violent against her.\par  reports errors in use of words to describe objects particularly when in a hurry. But there are no significant problems in comprehension. She admits feeling tired all day and staying up or multiple arousals at night. However she also admits keeping the TV on at night (because of concern

## 2019-10-08 NOTE — REVIEW OF SYSTEMS
[Feeling Tired] : feeling tired [Feeling Poorly] : feeling poorly [Recent Weight Loss (___ Lbs)] : recent [unfilled] ~Ulb weight loss [Confused or Disoriented] : confusion [Memory Lapses or Loss] : memory loss [Changed Thought Patterns] : changed thought patterns [Difficulty with Language] : ~M difficulty with language [Numbness] : numbness [Tingling] : tingling [Hypersensitivity] : hypersensitivity [Abnormal Sensation] : an abnormal sensation [Migraine Headache] : migraine headaches [Sleep Disturbances] : sleep disturbances [Depression] : depression [Emotional Problems] : emotional problems [Change In Personality] : personality change [Fever] : no fever [Chills] : no chills [Recent Weight Gain (___ Lbs)] : no recent weight gain [Decr. Concentrating Ability] : no decrease in concentrating ability [Repeating Questions] : no repeated questioning about recent events [Hand Weakness] : no hand weakness [Leg Weakness] : no leg weakness [Difficulties in Speech] : no speech difficulties [Seizures] : no convulsions [Dizziness] : no dizziness [Fainting] : no fainting [Lightheadedness] : no lightheadedness [Vertigo] : no vertigo [Difficulty Walking] : no difficulty walking [Inability to Walk] : able to walk [Ataxia] : no ataxia [Frequent Falls] : not falling [Limping] : not limping [Suicidal] : not suicidal [Anxiety] : no anxiety [Shortness Of Breath] : no shortness of breath [Breast Pain] : no breast pain

## 2019-10-14 ENCOUNTER — RX RENEWAL (OUTPATIENT)
Age: 34
End: 2019-10-14

## 2019-10-22 PROBLEM — I63.9 THALAMIC STROKE: Status: ACTIVE | Noted: 2019-07-11

## 2019-10-22 NOTE — DISCUSSION/SUMMARY
[FreeTextEntry1] : Ms. Junior is a 33 year-old woman with cryptogenic CVA with ASD/PFO.\par \par Plan:\par 1. Given the CVA without other cause will recommend a closure of the PFO/ASD.\par 2. c/w DAPT\par 3. Patient and  agree to the r/a/b/c/i of the procedure and plan.\par 4. Old records requested and reviewed with performing physician.\par 5. Primary and secondary prevention of cardiovascular and related conditions discussed at length, including but not limited to diet and lifestyle modification.\par 6. Patient to return to the office in 1-2 months.\par \par Thank you for allowing me to participate in the care of your patient. If you have any questions, please feel free to contact me at [phone] or via email at [email or web address].\par \par Sincerely,\par \par Brandee Pereira MD FACC

## 2019-10-22 NOTE — PHYSICAL EXAM
[Normal Appearance] : normal appearance [General Appearance - Well Developed] : well developed [Well Groomed] : well groomed [General Appearance - Well Nourished] : well nourished [No Deformities] : no deformities [General Appearance - In No Acute Distress] : no acute distress [Normal Conjunctiva] : the conjunctiva exhibited no abnormalities [Normal Oral Mucosa] : normal oral mucosa [Eyelids - No Xanthelasma] : the eyelids demonstrated no xanthelasmas [No Oral Pallor] : no oral pallor [No Oral Cyanosis] : no oral cyanosis [Normal Jugular Venous A Waves Present] : normal jugular venous A waves present [Normal Jugular Venous V Waves Present] : normal jugular venous V waves present [No Jugular Venous Nuñez A Waves] : no jugular venous nuñez A waves [Exaggerated Use Of Accessory Muscles For Inspiration] : no accessory muscle use [Respiration, Rhythm And Depth] : normal respiratory rhythm and effort [Heart Rate And Rhythm] : heart rate and rhythm were normal [Heart Sounds] : normal S1 and S2 [Auscultation Breath Sounds / Voice Sounds] : lungs were clear to auscultation bilaterally [Abdomen Soft] : soft [Abdomen Tenderness] : non-tender [Murmurs] : no murmurs present [Abnormal Walk] : normal gait [Abdomen Mass (___ Cm)] : no abdominal mass palpated [Gait - Sufficient For Exercise Testing] : the gait was sufficient for exercise testing [Nail Clubbing] : no clubbing of the fingernails [Petechial Hemorrhages (___cm)] : no petechial hemorrhages [Cyanosis, Localized] : no localized cyanosis [Skin Color & Pigmentation] : normal skin color and pigmentation [No Venous Stasis] : no venous stasis [] : no rash [Skin Lesions] : no skin lesions [No Xanthoma] : no  xanthoma was observed [No Skin Ulcers] : no skin ulcer [Oriented To Time, Place, And Person] : oriented to person, place, and time [Affect] : the affect was normal [Mood] : the mood was normal [No Anxiety] : not feeling anxious

## 2019-10-31 PROCEDURE — 93005 ELECTROCARDIOGRAM TRACING: CPT

## 2019-10-31 PROCEDURE — C1894: CPT

## 2019-10-31 PROCEDURE — 99152 MOD SED SAME PHYS/QHP 5/>YRS: CPT

## 2019-10-31 PROCEDURE — 81025 URINE PREGNANCY TEST: CPT

## 2019-10-31 PROCEDURE — 83036 HEMOGLOBIN GLYCOSYLATED A1C: CPT

## 2019-10-31 PROCEDURE — C1769: CPT

## 2019-10-31 PROCEDURE — C1887: CPT

## 2019-10-31 PROCEDURE — 93580 TRANSCATH CLOSURE OF ASD: CPT

## 2019-10-31 PROCEDURE — 80061 LIPID PANEL: CPT

## 2019-10-31 PROCEDURE — 85027 COMPLETE CBC AUTOMATED: CPT

## 2019-10-31 PROCEDURE — 93308 TTE F-UP OR LMTD: CPT

## 2019-10-31 PROCEDURE — 86780 TREPONEMA PALLIDUM: CPT

## 2019-10-31 PROCEDURE — 84443 ASSAY THYROID STIM HORMONE: CPT

## 2019-10-31 PROCEDURE — 99153 MOD SED SAME PHYS/QHP EA: CPT

## 2019-10-31 PROCEDURE — C1759: CPT

## 2019-10-31 PROCEDURE — 84702 CHORIONIC GONADOTROPIN TEST: CPT

## 2019-10-31 PROCEDURE — C1817: CPT

## 2019-10-31 PROCEDURE — 36415 COLL VENOUS BLD VENIPUNCTURE: CPT

## 2019-10-31 PROCEDURE — 80048 BASIC METABOLIC PNL TOTAL CA: CPT

## 2019-10-31 PROCEDURE — 93321 DOPPLER ECHO F-UP/LMTD STD: CPT

## 2019-12-16 ENCOUNTER — APPOINTMENT (OUTPATIENT)
Dept: NEUROLOGY | Facility: CLINIC | Age: 34
End: 2019-12-16
Payer: MEDICAID

## 2019-12-16 VITALS
OXYGEN SATURATION: 99 % | HEIGHT: 67 IN | WEIGHT: 174.17 LBS | BODY MASS INDEX: 27.34 KG/M2 | SYSTOLIC BLOOD PRESSURE: 116 MMHG | DIASTOLIC BLOOD PRESSURE: 81 MMHG | HEART RATE: 87 BPM

## 2019-12-16 PROCEDURE — 99214 OFFICE O/P EST MOD 30 MIN: CPT

## 2019-12-17 VITALS — BODY MASS INDEX: 26.94 KG/M2 | WEIGHT: 172 LBS

## 2019-12-17 NOTE — DISCUSSION/SUMMARY
[FreeTextEntry1] : Paraneoplastic profile normal; advised to read and practise SAT tests to improve memory and prepare for college;\par Discussed sleep hygiene - avoid cell phone. laptop and TV at bed time; advised read her book for cognitive improvement after stroke. \par Discussed cause of stroke - PFO vs migraine. Advised follow up with cardiology.

## 2019-12-17 NOTE — ASSESSMENT
[FreeTextEntry1] : Stable; Treat migraine that appears to be turning out to be the only explanation for the stroke in a young adult. Increase zonisamide \par Minoxidil for hair loss\par Quetiapine for sleep and depression

## 2019-12-17 NOTE — HISTORY OF PRESENT ILLNESS
[FreeTextEntry1] : Admitted to [facility] June 9 with difficulty speaking. 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 \par  notes irritability anger and violent behavior against him; she reports he provokes her but admits he has not been violent against her.\par  reports errors in use of words to describe objects particularly when in a hurry. But there are no significant problems in comprehension. She admits feeling tired all day and staying up or multiple arousals at night. However she also admits keeping the TV on at night (because of concern.\par Headaches do not occur; very pleased with weight loss

## 2019-12-17 NOTE — REVIEW OF SYSTEMS
[Feeling Poorly] : feeling poorly [Feeling Tired] : feeling tired [Recent Weight Loss (___ Lbs)] : recent [unfilled] ~Ulb weight loss [Confused or Disoriented] : confusion [Memory Lapses or Loss] : memory loss [Difficulty with Language] : ~M difficulty with language [Changed Thought Patterns] : changed thought patterns [Numbness] : numbness [Tingling] : tingling [Abnormal Sensation] : an abnormal sensation [Hypersensitivity] : hypersensitivity [Migraine Headache] : migraine headaches [Sleep Disturbances] : sleep disturbances [Depression] : depression [Change In Personality] : personality change [Emotional Problems] : emotional problems [Fever] : no fever [Chills] : no chills [Recent Weight Gain (___ Lbs)] : no recent weight gain [Decr. Concentrating Ability] : no decrease in concentrating ability [Repeating Questions] : no repeated questioning about recent events [Hand Weakness] : no hand weakness [Leg Weakness] : no leg weakness [Difficulties in Speech] : no speech difficulties [Seizures] : no convulsions [Dizziness] : no dizziness [Fainting] : no fainting [Lightheadedness] : no lightheadedness [Vertigo] : no vertigo [Difficulty Walking] : no difficulty walking [Inability to Walk] : able to walk [Ataxia] : no ataxia [Frequent Falls] : not falling [Suicidal] : not suicidal [Limping] : not limping [Breast Pain] : no breast pain [Anxiety] : no anxiety [Shortness Of Breath] : no shortness of breath

## 2020-01-21 ENCOUNTER — APPOINTMENT (OUTPATIENT)
Dept: CARDIOLOGY | Facility: CLINIC | Age: 35
End: 2020-01-21
Payer: MEDICAID

## 2020-01-21 VITALS — OXYGEN SATURATION: 100 % | HEART RATE: 86 BPM | SYSTOLIC BLOOD PRESSURE: 139 MMHG | DIASTOLIC BLOOD PRESSURE: 91 MMHG

## 2020-01-21 DIAGNOSIS — Q21.1 ATRIAL SEPTAL DEFECT: ICD-10-CM

## 2020-01-21 PROCEDURE — 99214 OFFICE O/P EST MOD 30 MIN: CPT

## 2020-01-21 PROCEDURE — 93000 ELECTROCARDIOGRAM COMPLETE: CPT

## 2020-01-21 RX ORDER — CLOPIDOGREL BISULFATE 75 MG/1
75 TABLET, FILM COATED ORAL DAILY
Qty: 14 | Refills: 3 | Status: COMPLETED | COMMUNITY
End: 2020-01-21

## 2020-02-06 ENCOUNTER — APPOINTMENT (OUTPATIENT)
Dept: NEUROLOGY | Facility: CLINIC | Age: 35
End: 2020-02-06
Payer: MEDICAID

## 2020-02-06 VITALS
SYSTOLIC BLOOD PRESSURE: 101 MMHG | HEART RATE: 98 BPM | DIASTOLIC BLOOD PRESSURE: 77 MMHG | WEIGHT: 172 LBS | BODY MASS INDEX: 27 KG/M2 | HEIGHT: 67 IN | OXYGEN SATURATION: 90 % | TEMPERATURE: 98 F

## 2020-02-06 PROCEDURE — 99214 OFFICE O/P EST MOD 30 MIN: CPT

## 2020-02-10 NOTE — REVIEW OF SYSTEMS
[Feeling Poorly] : feeling poorly [Feeling Tired] : feeling tired [Confused or Disoriented] : confusion [Recent Weight Loss (___ Lbs)] : recent [unfilled] ~Ulb weight loss [Memory Lapses or Loss] : memory loss [Difficulty with Language] : ~M difficulty with language [Changed Thought Patterns] : changed thought patterns [Numbness] : numbness [Abnormal Sensation] : an abnormal sensation [Hypersensitivity] : hypersensitivity [Tingling] : tingling [Migraine Headache] : migraine headaches [Sleep Disturbances] : sleep disturbances [Change In Personality] : personality change [Depression] : depression [Emotional Problems] : emotional problems [Chills] : no chills [Fever] : no fever [Recent Weight Gain (___ Lbs)] : no recent weight gain [Decr. Concentrating Ability] : no decrease in concentrating ability [Repeating Questions] : no repeated questioning about recent events [Leg Weakness] : no leg weakness [Hand Weakness] : no hand weakness [Seizures] : no convulsions [Difficulties in Speech] : no speech difficulties [Dizziness] : no dizziness [Fainting] : no fainting [Lightheadedness] : no lightheadedness [Vertigo] : no vertigo [Inability to Walk] : able to walk [Difficulty Walking] : no difficulty walking [Ataxia] : no ataxia [Frequent Falls] : not falling [Limping] : not limping [Shortness Of Breath] : no shortness of breath [Anxiety] : no anxiety [Suicidal] : not suicidal [Breast Pain] : no breast pain

## 2020-02-10 NOTE — HISTORY OF PRESENT ILLNESS
[FreeTextEntry1] : Admitted to [facility] June 9 with difficulty speaking. 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 \par  notes irritability anger and violent behavior against him; she reports he provokes her but admits he has not been violent against her.\par  reports errors in use of words to describe objects particularly when in a hurry. But there are no significant problems in comprehension. She admits feeling tired all day and staying up or multiple arousals at night. However she also admits keeping the TV on at night (because of concern.\par Headaches do not occur; very pleased with weight loss\par 2/6/2020:  reports she has not been using quetiapine to help her sleep because she read it was an antipsychotic

## 2020-02-27 PROBLEM — Q21.1 PFO (PATENT FORAMEN OVALE): Status: ACTIVE | Noted: 2019-07-11

## 2020-02-27 NOTE — PHYSICAL EXAM
[General Appearance - Well Developed] : well developed [Normal Appearance] : normal appearance [Well Groomed] : well groomed [No Deformities] : no deformities [General Appearance - Well Nourished] : well nourished [Eyelids - No Xanthelasma] : the eyelids demonstrated no xanthelasmas [General Appearance - In No Acute Distress] : no acute distress [Normal Conjunctiva] : the conjunctiva exhibited no abnormalities [No Oral Pallor] : no oral pallor [Normal Oral Mucosa] : normal oral mucosa [No Oral Cyanosis] : no oral cyanosis [Normal Jugular Venous V Waves Present] : normal jugular venous V waves present [Normal Jugular Venous A Waves Present] : normal jugular venous A waves present [No Jugular Venous Nuñez A Waves] : no jugular venous nuñez A waves [Exaggerated Use Of Accessory Muscles For Inspiration] : no accessory muscle use [Respiration, Rhythm And Depth] : normal respiratory rhythm and effort [Heart Rate And Rhythm] : heart rate and rhythm were normal [Auscultation Breath Sounds / Voice Sounds] : lungs were clear to auscultation bilaterally [Heart Sounds] : normal S1 and S2 [Murmurs] : no murmurs present [Abdomen Soft] : soft [Abdomen Tenderness] : non-tender [Abdomen Mass (___ Cm)] : no abdominal mass palpated [Gait - Sufficient For Exercise Testing] : the gait was sufficient for exercise testing [Abnormal Walk] : normal gait [Cyanosis, Localized] : no localized cyanosis [Nail Clubbing] : no clubbing of the fingernails [Petechial Hemorrhages (___cm)] : no petechial hemorrhages [Skin Color & Pigmentation] : normal skin color and pigmentation [No Venous Stasis] : no venous stasis [] : no rash [Skin Lesions] : no skin lesions [No Skin Ulcers] : no skin ulcer [No Xanthoma] : no  xanthoma was observed [Oriented To Time, Place, And Person] : oriented to person, place, and time [Affect] : the affect was normal [No Anxiety] : not feeling anxious [Mood] : the mood was normal

## 2020-02-27 NOTE — HISTORY OF PRESENT ILLNESS
[FreeTextEntry1] : Ms. Junior is a 34 year-old woman with known CVA with evidence of an ASD/PFO. She is referred to be evaulated for closure. She is slowly recovering from her event however her  states that she is slower to respond than previously.

## 2020-02-27 NOTE — DISCUSSION/SUMMARY
[FreeTextEntry1] : Ms. Junior is a 34 year-old woman with cryptogenic CVA with ASD/PFO.\par \par Plan:\par 1. Successful closre of the PFO.\par 2. c/w ASA, no further clopdigrel is needed.\par 3. no further abx prophylaxis.\par 4. Old records requested and reviewed with performing physician.\par 5. Primary and secondary prevention of cardiovascular and related conditions discussed at length, including but not limited to diet and lifestyle modification.\par 6. Patient to return to the office in 1-2 months.\par \par Thank you for allowing me to participate in the care of your patient. If you have any questions, please feel free to contact me at [phone] or via email at [email or web address].\par \par Sincerely,\par \par Brandee Pereira MD [facility]

## 2020-04-02 ENCOUNTER — APPOINTMENT (OUTPATIENT)
Dept: NEUROLOGY | Facility: CLINIC | Age: 35
End: 2020-04-02
Payer: MEDICAID

## 2020-04-02 ENCOUNTER — APPOINTMENT (OUTPATIENT)
Dept: NEUROLOGY | Facility: CLINIC | Age: 35
End: 2020-04-02

## 2020-04-02 PROCEDURE — 99441: CPT

## 2020-04-07 NOTE — DISCUSSION/SUMMARY
[FreeTextEntry1] : Doing well; using only one quetiapine every night; cardiologist reportedly asked her to take two Aspirin 81 mg each every day because of PFO\par Reviewed refills on medications- she has enough of zonisamide and quetiapine. \par Encouraged to continue all medications as scheduled

## 2020-05-14 NOTE — DISCHARGE NOTE OB - NS OB DC IMMUNIZATIONS2 YN
Group Topic:  ADITI Process Group    Date: 5/14/2020  Start Time: 0900  End Time: 1000  Facilitators: Ezequiel Lowe LPC    Focus: Check-in/process   Number in attendance: 7      Goals: Members checked in and were encouraged to share their progress and challenges in recovery.  Method: Group  Attendance: Present  Mood/Affect: Appropriate  Behavior/Socialization: Appropriate to group  Participation: Active  Overall Patient Response to Group: Appropriate to topic  Individual Response to Group: Rolando shared he had a craving last night and didn't recognize where it came from.  He is ambivalent about stopping marijuana at the same time as quitting alcohol.  Ability to Apply Content to Group: 5  Ezequiel Lowe MS, KOBY, SAC   No

## 2020-07-20 ENCOUNTER — APPOINTMENT (OUTPATIENT)
Dept: NEUROLOGY | Facility: CLINIC | Age: 35
End: 2020-07-20
Payer: MEDICAID

## 2020-07-20 VITALS
SYSTOLIC BLOOD PRESSURE: 132 MMHG | OXYGEN SATURATION: 98 % | BODY MASS INDEX: 25.43 KG/M2 | TEMPERATURE: 98.8 F | WEIGHT: 162 LBS | HEART RATE: 101 BPM | DIASTOLIC BLOOD PRESSURE: 90 MMHG | HEIGHT: 67 IN

## 2020-07-20 PROCEDURE — 99214 OFFICE O/P EST MOD 30 MIN: CPT

## 2020-07-20 RX ORDER — QUETIAPINE FUMARATE 25 MG/1
25 TABLET ORAL
Qty: 180 | Refills: 3 | Status: DISCONTINUED | COMMUNITY
Start: 2019-12-16 | End: 2020-07-20

## 2020-07-20 NOTE — REVIEW OF SYSTEMS
[Feeling Poorly] : feeling poorly [Feeling Tired] : feeling tired [Recent Weight Loss (___ Lbs)] : recent [unfilled] ~Ulb weight loss [Confused or Disoriented] : confusion [Memory Lapses or Loss] : memory loss [Difficulty with Language] : ~M difficulty with language [Changed Thought Patterns] : changed thought patterns [Numbness] : numbness [Tingling] : tingling [Abnormal Sensation] : an abnormal sensation [Hypersensitivity] : hypersensitivity [Migraine Headache] : migraine headaches [Sleep Disturbances] : sleep disturbances [Depression] : depression [Change In Personality] : personality change [Emotional Problems] : emotional problems [Fever] : no fever [Chills] : no chills [Decr. Concentrating Ability] : no decrease in concentrating ability [Recent Weight Gain (___ Lbs)] : no recent weight gain [Repeating Questions] : no repeated questioning about recent events [Hand Weakness] : no hand weakness [Difficulties in Speech] : no speech difficulties [Leg Weakness] : no leg weakness [Fainting] : no fainting [Dizziness] : no dizziness [Seizures] : no convulsions [Vertigo] : no vertigo [Difficulty Walking] : no difficulty walking [Lightheadedness] : no lightheadedness [Ataxia] : no ataxia [Frequent Falls] : not falling [Inability to Walk] : able to walk [Anxiety] : no anxiety [Limping] : not limping [Suicidal] : not suicidal [Breast Pain] : no breast pain [Shortness Of Breath] : no shortness of breath

## 2020-07-20 NOTE — DISCUSSION/SUMMARY
[FreeTextEntry1] : Paraneoplastic profile normal; advised to read and practise SAT tests to improve memory and prepare for college;\par Discussed sleep hygiene - avoid cell phone. laptop and TV at bed time; advised read her book for cognitive improvement after stroke. \par Discussed cause of stroke - PFO vs migraine. Advised follow up with cardiology.\par  reveals behavior problems even prior to the stroke. Recommend higher dose of quetiapine
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
## 2020-10-15 NOTE — ED PROVIDER NOTE - MUSCULOSKELETAL, MLM
Mom says baby is very sleepy but was able to express milk into baby's mouth. Encouraged to unwrap baby and do skin to skin to wake baby q2-3 hours for feeding.  
Spine appears normal, range of motion is not limited, no muscle or joint tenderness

## 2020-10-22 ENCOUNTER — APPOINTMENT (OUTPATIENT)
Dept: NEUROLOGY | Facility: CLINIC | Age: 35
End: 2020-10-22
Payer: MEDICAID

## 2020-10-22 VITALS
TEMPERATURE: 99.1 F | OXYGEN SATURATION: 98 % | DIASTOLIC BLOOD PRESSURE: 86 MMHG | HEART RATE: 105 BPM | HEIGHT: 67 IN | WEIGHT: 182 LBS | BODY MASS INDEX: 28.56 KG/M2 | SYSTOLIC BLOOD PRESSURE: 129 MMHG

## 2020-10-22 PROCEDURE — 99214 OFFICE O/P EST MOD 30 MIN: CPT

## 2020-10-22 PROCEDURE — 99072 ADDL SUPL MATRL&STAF TM PHE: CPT

## 2020-10-29 NOTE — HISTORY OF PRESENT ILLNESS
[FreeTextEntry1] : Feels well; lost her last pregnancy. Denies headache. Claims she is using quetiapine and it is helping her. Requests a refill for quetiapine

## 2020-10-29 NOTE — REVIEW OF SYSTEMS
[Feeling Poorly] : feeling poorly [Feeling Tired] : feeling tired [Recent Weight Loss (___ Lbs)] : recent [unfilled] ~Ulb weight loss [Confused or Disoriented] : confusion [Memory Lapses or Loss] : memory loss [Difficulty with Language] : ~M difficulty with language [Changed Thought Patterns] : changed thought patterns [Numbness] : numbness [Tingling] : tingling [Abnormal Sensation] : an abnormal sensation [Hypersensitivity] : hypersensitivity [Migraine Headache] : migraine headaches [Sleep Disturbances] : sleep disturbances [Depression] : depression [Change In Personality] : personality change [Emotional Problems] : emotional problems [Fever] : no fever [Chills] : no chills [Recent Weight Gain (___ Lbs)] : no recent weight gain [Decr. Concentrating Ability] : no decrease in concentrating ability [Repeating Questions] : no repeated questioning about recent events [Hand Weakness] : no hand weakness [Leg Weakness] : no leg weakness [Difficulties in Speech] : no speech difficulties [Seizures] : no convulsions [Dizziness] : no dizziness [Fainting] : no fainting [Lightheadedness] : no lightheadedness [Vertigo] : no vertigo [Difficulty Walking] : no difficulty walking [Inability to Walk] : able to walk [Ataxia] : no ataxia [Frequent Falls] : not falling [Limping] : not limping [Suicidal] : not suicidal [Anxiety] : no anxiety [Shortness Of Breath] : no shortness of breath [Breast Pain] : no breast pain

## 2020-10-29 NOTE — DISCUSSION/SUMMARY
[FreeTextEntry1] : Previous artery of Pincheron stroke with aphasia; almost recovered; likely worsening of pre-existing bipolar disorder or affective psychosis. REsponsive to current medications for migraine intractable and insomnia

## 2021-01-26 ENCOUNTER — APPOINTMENT (OUTPATIENT)
Dept: NEUROLOGY | Facility: CLINIC | Age: 36
End: 2021-01-26
Payer: MEDICAID

## 2021-01-26 VITALS
SYSTOLIC BLOOD PRESSURE: 132 MMHG | WEIGHT: 176 LBS | TEMPERATURE: 97.2 F | OXYGEN SATURATION: 99 % | HEIGHT: 67 IN | HEART RATE: 80 BPM | BODY MASS INDEX: 27.62 KG/M2 | DIASTOLIC BLOOD PRESSURE: 90 MMHG

## 2021-01-26 DIAGNOSIS — M54.5 LOW BACK PAIN: ICD-10-CM

## 2021-01-26 PROCEDURE — 99072 ADDL SUPL MATRL&STAF TM PHE: CPT

## 2021-01-26 PROCEDURE — 99214 OFFICE O/P EST MOD 30 MIN: CPT

## 2021-01-26 NOTE — DISCUSSION/SUMMARY
[FreeTextEntry1] : Migraine; sequelae of stroke; psychosis controlled partially with quetiapine and zonisamide. Recommend continue current medications and follow up

## 2021-04-20 ENCOUNTER — APPOINTMENT (OUTPATIENT)
Dept: CARDIOLOGY | Facility: CLINIC | Age: 36
End: 2021-04-20

## 2021-04-27 ENCOUNTER — APPOINTMENT (OUTPATIENT)
Dept: NEUROLOGY | Facility: CLINIC | Age: 36
End: 2021-04-27
Payer: MEDICAID

## 2021-04-27 VITALS
BODY MASS INDEX: 27.31 KG/M2 | DIASTOLIC BLOOD PRESSURE: 82 MMHG | TEMPERATURE: 97.8 F | WEIGHT: 174 LBS | HEIGHT: 67 IN | OXYGEN SATURATION: 98 % | SYSTOLIC BLOOD PRESSURE: 115 MMHG | HEART RATE: 81 BPM

## 2021-04-27 PROCEDURE — 99215 OFFICE O/P EST HI 40 MIN: CPT

## 2021-04-27 PROCEDURE — 99072 ADDL SUPL MATRL&STAF TM PHE: CPT

## 2021-04-27 NOTE — HISTORY OF PRESENT ILLNESS
[FreeTextEntry1] : Her mother passed away suddenly of a myocardial infarction to 3 weeks ago.  She was in shock and stopped using medications.  A few days after she discontinued her medications she became delusional paranoid accusing her his  of  different misdeeds and ill-will.  Her  called me and I spoke with her requesting her to restart the medication or go to the emergency room.  Fortunately she did restart quetiapine and has been well since then although she continues to be very despondent.

## 2021-04-27 NOTE — DISCUSSION/SUMMARY
[FreeTextEntry1] : 1.  History of thalamic stroke due to occlusion of artery of Pincheron.  She is encouraged to continue aspirin and atorvastatin.  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  Mood disorder with psychosis.  She has been recommended quetiapine 200 mg at night and has been compliant more recently after.  Of noncompliance immediately following her mother's sudden unexpected death.\par \par 3 migraine headache.  She has been recommended zonisamide 150 mg 2 times a day and remains compliant.  We discussed avoidance of weight gain.  She has regained 10 pounds since her lowest at 162 although she still remains 30 pounds less than her heaviest at 192.

## 2021-06-28 ENCOUNTER — APPOINTMENT (OUTPATIENT)
Dept: NEUROLOGY | Facility: CLINIC | Age: 36
End: 2021-06-28

## 2021-09-16 NOTE — PROGRESS NOTE BEHAVIORAL HEALTH - NSBHADMITCOORDWITH_PSY_A_CORE
23 yo  at 39 3/7 weeks gestation, GBS negative presented to L+D from OB's office after c/o painful uterine contractions, with no rupture of membranes, and was found to be 3cm dilated.  She was admitted to L+D for labor, CBC:                        13.4   12.56 )-----------( 287      ( 16 Sep 2021 16:24 )             38.8   COVID-19 PCR: NotDetec (16 Sep 2021 17:45)  SARS-CoV-2: NotDetec (2021 14:17)  She progressed to full dilatation with epidural anaesthesia.      Paragard IUD was inserted after delivery of placenta.  Lot # [id number]. 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  She was admitted to L+D for labor, CBC:                        13.4   12.56 )-----------( 287      ( 16 Sep 2021 16:24 )             38.8   COVID-19 PCR: NotDetec (16 Sep 2021 17:45)  SARS-CoV-2: NotDetec (2021 14:17)  She progressed to full dilatation with epidural anaesthesia.  She delivered with no complications by vaginal route to a viable male infant of 6lb 12oz, Apgars 7/9.  her recovery was uneventful and her postpartum CBC:                        11.9   x     )-----------( x        ( 17 Sep 2021 11:43 )             34.1   She is being discharged home in stable condition with her  on postpartum day #2.    Paragard IUD was inserted after delivery of placenta.  Lot # [id number].

## 2021-09-22 NOTE — PATIENT PROFILE ADULT - NSPROEXTENSIONSOFSELF_GEN_A_NUR
INTERNAL MEDICINE OFFICE VISIT    Anila Dill is a 70 year old female who presents today for   Chief Complaint   Patient presents with   • Follow-up     HTN         SUBJECTIVE:   HISTORY OF PRESENT ILLNESS:  Patient is 70 years old lady comes in for evaluation of hypertension, hyperlipidemia, insomnia, DJD etc.  She complains of constipation and lump on the left upper part of her belly.  She denies chest pain on exertion, cough, difficulty in breathing, palpitation, heartburn, dysphagia, abdominal pain, dysuria or change in the bowel habits.  She takes temazepam to sleep on a regular basis.  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  She has signed a contract but she is not sure it will go through.  Her medications were reviewed.  Appropriate prescriptions were given to her.  She does not want a flu vaccine.  Benefit of exercising on a regular basis was discussed.    PAST MEDICAL HISTORY:  Past Medical History:   Diagnosis Date   • Anxiety and depression    • Bipolar affective (CMS/HCC)    • DJD (degenerative joint disease)    • Essential (primary) hypertension    • Hyperlipidemia    • Hypothyroidism    • Insomnia    • Vertigo        PAST SURGICAL HISTORY:  No past surgical history on file.    FAMILY HISTORY:  No family history on file.    SOCIAL HISTORY:  Social History     Tobacco Use   • Smoking status: Never Smoker   • Smokeless tobacco: Never Used   Substance Use Topics   • Alcohol use: Not on file   • Drug use: Not on file       ALLERGIES:  ALLERGIES:  No Known Allergies    MEDICATIONS:  Current Outpatient Medications   Medication Sig Dispense Refill   • simvastatin (ZOCOR) 20 MG tablet Take 1 tablet by mouth daily. 90 tablet 0   • meclizine (ANTIVERT) 12.5 MG tablet Take 1 tablet by mouth 3 times daily as needed for Dizziness. 30 tablet 3   • Temazepam 30 MG capsule Take 1 capsule by mouth at  bedtime. 90 capsule 0   • hydroCORTisone (ANUSOL-HC) 2.5 % rectal cream Place 1 application rectally 2 times daily. 30 g 0   • amLODIPine (NORVASC) 10 MG tablet Take 1 tablet by mouth daily. 90 tablet 3   • acetaminophen (TYLENOL 8 HOUR) 650 MG CR tablet Take 650 mg by mouth every 8 hours as needed for Pain.     • triamcinolone (ARISTOCORT) 0.1 % cream Apply topically 2 times daily. 15 g 1   • fluocinonide (LIDEX) 0.05 % cream Apply topically 2 times daily. APPLY SPARINGLY TO AFFECTED AREA(S) TWICE DAILY 30 g 1     No current facility-administered medications for this visit.       Review of Systems   Constitutional: Negative.    HENT: Negative.    Respiratory: Negative.    Gastrointestinal: Positive for constipation.   Genitourinary: Negative.    Musculoskeletal: Negative.    Skin:        Couple lumps on the lateral aspect of her belly.   Neurological: Negative.    Psychiatric/Behavioral: The patient is nervous/anxious.         OBJECTIVE:     Visit Vitals  BP (!) 153/80 (BP Location: RUE - Right upper extremity, Patient Position: Sitting, Cuff Size: Regular)   Pulse 96   Temp 98.3 °F (36.8 °C)   Ht 4' 10\" (1.473 m)   Wt 53.5 kg (118 lb)   BMI 24.66 kg/m²       Physical Exam  Constitutional:       Appearance: Normal appearance.   HENT:      Head: Normocephalic and atraumatic.   Cardiovascular:      Rate and Rhythm: Normal rate and regular rhythm.      Heart sounds: Normal heart sounds. No murmur heard.   No gallop.    Pulmonary:      Effort: Pulmonary effort is normal.      Breath sounds: Normal breath sounds. No wheezing or rales.   Abdominal:      General: Abdomen is flat.      Palpations: Abdomen is soft.      Comments: He has less than a centimeter size lump on the lateral aspect of her belly just on the last rib which is probably subcutaneous fibroma or lipoma.  There is no that even smaller lump in the subcutaneous area 2 inches below the first lump which is probably lipoma.  She was reassured and she was  advised to monitor them.   Musculoskeletal:      Right lower leg: No edema.      Left lower leg: No edema.   Skin:     General: Skin is warm.      Findings: No erythema or rash.   Neurological:      General: No focal deficit present.      Mental Status: She is alert and oriented to person, place, and time.   Psychiatric:         Mood and Affect: Mood normal.         Thought Content: Thought content normal.         DIAGNOSTIC STUDIES:   LAB RESULTS:    No results found for this visit on 09/22/21.    ASSESSMENT AND PLAN:     Problem List Items Addressed This Visit        Cardiac and Vasculature    HTN (hypertension) - Primary     Her blood pressure is not well controlled.  She was advised to reduce salt and practice mindfulness and meditation.  I believe her anxiety is responsible for uncontrolled blood pressure.         Relevant Medications    simvastatin (ZOCOR) 20 MG tablet    Hyperlipemia     She is taking simvastatin 20 mg in the evening she was advised to continue it.         Relevant Medications    simvastatin (ZOCOR) 20 MG tablet       Gastrointestinal and Abdominal    Slow transit constipation     He was advised to eat plenty of fruits and vegetables.  Additionally she should take 1 tablespoonful of Metamucil in 6 ounces of warm water every day in the morning.            Mental Health    Anxiety and depression     Exercise on a regular basis, optimal sleep and practicing mindfulness and meditation would be desirable.  As [id number] she takes hydroxyzine intermittently I think that would also help her reduce her anxiety.            Sleep    Insomnia            Return if symptoms worsen or fail to improve.    Instructions provided as documented in the AVS.    Time Spent: 35 minutes    This transcript is produced with the help of voice recognition system.  Spelling and grammar may not be accurate due to problem with voice recognition system.  I have signed it without reading.   The patient indicated  understanding of the diagnosis, agreed with the plan of care and agreed to call or return with any new or worsening symptoms.    Florecita Carlos MD  9/22/2021    none

## 2021-11-30 ENCOUNTER — APPOINTMENT (OUTPATIENT)
Dept: NEUROLOGY | Facility: CLINIC | Age: 36
End: 2021-11-30
Payer: MEDICAID

## 2021-11-30 VITALS
HEART RATE: 82 BPM | SYSTOLIC BLOOD PRESSURE: 126 MMHG | BODY MASS INDEX: 27.94 KG/M2 | WEIGHT: 178 LBS | HEIGHT: 67 IN | TEMPERATURE: 97.6 F | DIASTOLIC BLOOD PRESSURE: 89 MMHG | OXYGEN SATURATION: 98 %

## 2021-11-30 PROCEDURE — 99215 OFFICE O/P EST HI 40 MIN: CPT

## 2021-11-30 RX ORDER — ASPIRIN 81 MG/1
81 TABLET, CHEWABLE ORAL DAILY
Qty: 100 | Refills: 0 | Status: ACTIVE | COMMUNITY
Start: 2020-04-10 | End: 1900-01-01

## 2021-12-02 NOTE — HISTORY OF PRESENT ILLNESS
[FreeTextEntry1] : She first presented in [address] [place] [address] with difficulty speaking and altered mental status.  She was found to have bilateral thalamic strokes due to the artery of Pincheron occlusion.  She was also found to have atrial septal defect that was closed.  She developed organic psychosis and insomnia that  was treated with antipsychotics.  She also complained of headaches that had the appearance of intractable migraine that have been controlled with the help of zonisamide.  Hair loss due to zonisamide has been controlled with minoxidil.  Constipation has been controlled with MiraLAX.  She is currently using aspirin cholesterol-lowering agents.\par \par She reports that her headaches are controlled.  She reports she sleeps well.  Her  who is usually the person who provides the history about her compliance or noncompliance with medications and psychotic episodes is not present with her today.

## 2021-12-02 NOTE — PHYSICAL EXAM
[Person] : oriented to person [Place] : oriented to place [Time] : oriented to time [Concentration Intact] : normal concentrating ability [Visual Intact] : visual attention was ~T not ~L decreased [Naming Objects] : no difficulty naming common objects [Repeating Phrases] : no difficulty repeating a phrase [Writing A Sentence] : no difficulty writing a sentence [Fluency] : fluency intact [Comprehension] : comprehension intact [Reading] : reading intact [Past History] : adequate knowledge of personal past history [Cranial Nerves Optic (II)] : visual acuity intact bilaterally,  visual fields full to confrontation, pupils equal round and reactive to light [Cranial Nerves Oculomotor (III)] : extraocular motion intact [Cranial Nerves Trigeminal (V)] : facial sensation intact symmetrically [Cranial Nerves Facial (VII)] : face symmetrical [Cranial Nerves Vestibulocochlear (VIII)] : hearing was intact bilaterally [Cranial Nerves Glossopharyngeal (IX)] : tongue and palate midline [Cranial Nerves Accessory (XI - Cranial And Spinal)] : head turning and shoulder shrug symmetric [Cranial Nerves Hypoglossal (XII)] : there was no tongue deviation with protrusion [Motor Tone] : muscle tone was normal in all four extremities [Motor Strength] : muscle strength was normal in all four extremities [No Muscle Atrophy] : normal bulk in all four extremities [Sensation Tactile Decrease] : light touch was intact [Abnormal Walk] : normal gait [Balance] : balance was intact [2+] : Ankle jerk left 2+ [Sclera] : the sclera and conjunctiva were normal [PERRL With Normal Accommodation] : pupils were equal in size, round, reactive to light, with normal accommodation [Extraocular Movements] : extraocular movements were intact [Outer Ear] : the ears and nose were normal in appearance [Oropharynx] : the oropharynx was normal [Neck Appearance] : the appearance of the neck was normal [Neck Cervical Mass (___cm)] : no neck mass was observed [Jugular Venous Distention Increased] : there was no jugular-venous distention [Thyroid Diffuse Enlargement] : the thyroid was not enlarged [Thyroid Nodule] : there were no palpable thyroid nodules [] : no respiratory distress [Auscultation Breath Sounds / Voice Sounds] : lungs were clear to auscultation bilaterally [Past-pointing] : there was no past-pointing [Tremor] : no tremor present [Plantar Reflex Right Only] : normal on the right [Plantar Reflex Left Only] : normal on the left

## 2021-12-02 NOTE — DISCUSSION/SUMMARY
[FreeTextEntry1] : Advised to continue current medications.  Advised to walk 45 minutes or 2 miles a day in order to avoid weight gain and to promote weight loss. Advised strict compliance with quetiapine; she claims she is compliant.

## 2021-12-02 NOTE — REVIEW OF SYSTEMS
[Dizziness] : dizziness [Migraine Headache] : migraine headaches [Negative] : Heme/Lymph [Confused or Disoriented] : no confusion [Memory Lapses or Loss] : no memory loss [Decr. Concentrating Ability] : no decrease in concentrating ability [Difficulty with Language] : no ~M difficulty with language [Facial Weakness] : no facial weakness [Arm Weakness] : no arm weakness [Hand Weakness] : no hand weakness [Leg Weakness] : no leg weakness [Poor Coordination] : good coordination [Numbness] : no numbness [Tingling] : no tingling [Abnormal Sensation] : no abnormal sensation [Seizures] : no convulsions [Fainting] : no fainting [Lightheadedness] : no lightheadedness [Vertigo] : no vertigo [Difficulty Walking] : no difficulty walking

## 2021-12-18 NOTE — ED ADULT TRIAGE NOTE - PAIN: PRESENCE, MLM
Care turned over to me pending synovial fluid cell count. This was obtained and shows 62,000 white cells. On my exam patient's presentation is consistent with gout. He states that it feels identical to previous gout flares. He has requested that I aspirate his knee again due to accumulation of fluid. I was able to aspirate 100 cc of synovial fluid. Patient is requesting discharge home and he appears safe for discharge on prednisone and pain medication. denies pain/discomfort

## 2021-12-21 ENCOUNTER — APPOINTMENT (OUTPATIENT)
Dept: MRI IMAGING | Facility: HOSPITAL | Age: 36
End: 2021-12-21
Payer: MEDICAID

## 2021-12-21 ENCOUNTER — OUTPATIENT (OUTPATIENT)
Dept: OUTPATIENT SERVICES | Facility: HOSPITAL | Age: 36
LOS: 1 days | End: 2021-12-21
Payer: MEDICAID

## 2021-12-21 DIAGNOSIS — I63.9 CEREBRAL INFARCTION, UNSPECIFIED: ICD-10-CM

## 2021-12-21 PROCEDURE — 70551 MRI BRAIN STEM W/O DYE: CPT

## 2021-12-21 PROCEDURE — 70551 MRI BRAIN STEM W/O DYE: CPT | Mod: 26

## 2022-03-08 ENCOUNTER — APPOINTMENT (OUTPATIENT)
Dept: NEUROLOGY | Facility: CLINIC | Age: 37
End: 2022-03-08
Payer: MEDICAID

## 2022-03-08 VITALS
TEMPERATURE: 97.7 F | WEIGHT: 186 LBS | HEART RATE: 80 BPM | DIASTOLIC BLOOD PRESSURE: 88 MMHG | OXYGEN SATURATION: 98 % | HEIGHT: 67 IN | SYSTOLIC BLOOD PRESSURE: 133 MMHG | BODY MASS INDEX: 29.19 KG/M2

## 2022-03-08 DIAGNOSIS — M79.641 PAIN IN RIGHT HAND: ICD-10-CM

## 2022-03-08 DIAGNOSIS — M79.631 PAIN IN LEFT FOREARM: ICD-10-CM

## 2022-03-08 DIAGNOSIS — M79.632 PAIN IN LEFT FOREARM: ICD-10-CM

## 2022-03-08 DIAGNOSIS — M79.642 PAIN IN RIGHT HAND: ICD-10-CM

## 2022-03-08 PROCEDURE — 99214 OFFICE O/P EST MOD 30 MIN: CPT

## 2022-03-08 NOTE — PHYSICAL EXAM
[Person] : oriented to person [Place] : oriented to place [Time] : oriented to time [Concentration Intact] : normal concentrating ability [Visual Intact] : visual attention was ~T not ~L decreased [Naming Objects] : no difficulty naming common objects [Repeating Phrases] : no difficulty repeating a phrase [Writing A Sentence] : no difficulty writing a sentence [Fluency] : fluency intact [Comprehension] : comprehension intact [Reading] : reading intact [Past History] : adequate knowledge of personal past history [Cranial Nerves Optic (II)] : visual acuity intact bilaterally,  visual fields full to confrontation, pupils equal round and reactive to light [Cranial Nerves Oculomotor (III)] : extraocular motion intact [Cranial Nerves Trigeminal (V)] : facial sensation intact symmetrically [Cranial Nerves Facial (VII)] : face symmetrical [Cranial Nerves Vestibulocochlear (VIII)] : hearing was intact bilaterally [Cranial Nerves Glossopharyngeal (IX)] : tongue and palate midline [Cranial Nerves Accessory (XI - Cranial And Spinal)] : head turning and shoulder shrug symmetric [Cranial Nerves Hypoglossal (XII)] : there was no tongue deviation with protrusion [Motor Tone] : muscle tone was normal in all four extremities [Motor Strength] : muscle strength was normal in all four extremities [No Muscle Atrophy] : normal bulk in all four extremities [Sensation Tactile Decrease] : light touch was intact [Abnormal Walk] : normal gait [Balance] : balance was intact [Past-pointing] : there was no past-pointing [Tremor] : no tremor present [2+] : Ankle jerk left 2+ [Plantar Reflex Right Only] : normal on the right [Plantar Reflex Left Only] : normal on the left [Sclera] : the sclera and conjunctiva were normal [PERRL With Normal Accommodation] : pupils were equal in size, round, reactive to light, with normal accommodation [Extraocular Movements] : extraocular movements were intact [Outer Ear] : the ears and nose were normal in appearance [Oropharynx] : the oropharynx was normal [Neck Appearance] : the appearance of the neck was normal [Neck Cervical Mass (___cm)] : no neck mass was observed [Jugular Venous Distention Increased] : there was no jugular-venous distention [Thyroid Diffuse Enlargement] : the thyroid was not enlarged [Thyroid Nodule] : there were no palpable thyroid nodules [] : no respiratory distress [Auscultation Breath Sounds / Voice Sounds] : lungs were clear to auscultation bilaterally

## 2022-03-08 NOTE — REVIEW OF SYSTEMS
[Confused or Disoriented] : no confusion [Memory Lapses or Loss] : no memory loss [Decr. Concentrating Ability] : no decrease in concentrating ability [Difficulty with Language] : no ~M difficulty with language [Facial Weakness] : no facial weakness [Arm Weakness] : no arm weakness [Hand Weakness] : no hand weakness [Leg Weakness] : no leg weakness [Poor Coordination] : good coordination [Numbness] : no numbness [Tingling] : no tingling [Abnormal Sensation] : no abnormal sensation [Seizures] : no convulsions [Dizziness] : dizziness [Fainting] : no fainting [Lightheadedness] : no lightheadedness [Vertigo] : no vertigo [Migraine Headache] : migraine headaches [Difficulty Walking] : no difficulty walking [Negative] : Heme/Lymph

## 2022-03-08 NOTE — HISTORY OF PRESENT ILLNESS
[FreeTextEntry1] : Forearm and hand pain. Has gained weight; compliant with medications; denies headache

## 2022-07-05 ENCOUNTER — APPOINTMENT (OUTPATIENT)
Dept: NEUROLOGY | Facility: CLINIC | Age: 37
End: 2022-07-05

## 2022-07-07 ENCOUNTER — APPOINTMENT (OUTPATIENT)
Dept: NEUROLOGY | Facility: CLINIC | Age: 37
End: 2022-07-07

## 2022-07-07 VITALS
BODY MASS INDEX: 28.88 KG/M2 | TEMPERATURE: 97.5 F | HEART RATE: 101 BPM | OXYGEN SATURATION: 99 % | SYSTOLIC BLOOD PRESSURE: 126 MMHG | HEIGHT: 67 IN | WEIGHT: 184 LBS | DIASTOLIC BLOOD PRESSURE: 84 MMHG

## 2022-07-07 DIAGNOSIS — R10.9 UNSPECIFIED ABDOMINAL PAIN: ICD-10-CM

## 2022-07-07 PROCEDURE — 99215 OFFICE O/P EST HI 40 MIN: CPT

## 2022-07-18 ENCOUNTER — LABORATORY RESULT (OUTPATIENT)
Age: 37
End: 2022-07-18

## 2022-07-18 ENCOUNTER — APPOINTMENT (OUTPATIENT)
Dept: GASTROENTEROLOGY | Facility: CLINIC | Age: 37
End: 2022-07-18

## 2022-07-18 VITALS
OXYGEN SATURATION: 99 % | HEIGHT: 67 IN | SYSTOLIC BLOOD PRESSURE: 137 MMHG | TEMPERATURE: 98.3 F | HEART RATE: 77 BPM | WEIGHT: 182 LBS | DIASTOLIC BLOOD PRESSURE: 81 MMHG | BODY MASS INDEX: 28.56 KG/M2

## 2022-07-18 DIAGNOSIS — K57.32 DIVERTICULITIS OF LARGE INTESTINE W/OUT PERFORATION OR ABSCESS W/OUT BLEEDING: ICD-10-CM

## 2022-07-18 DIAGNOSIS — R10.31 RIGHT LOWER QUADRANT PAIN: ICD-10-CM

## 2022-07-18 DIAGNOSIS — K59.09 OTHER CONSTIPATION: ICD-10-CM

## 2022-07-18 DIAGNOSIS — G89.29 RIGHT LOWER QUADRANT PAIN: ICD-10-CM

## 2022-07-18 DIAGNOSIS — R10.13 EPIGASTRIC PAIN: ICD-10-CM

## 2022-07-18 PROCEDURE — 99204 OFFICE O/P NEW MOD 45 MIN: CPT

## 2022-07-18 RX ORDER — BIFIDOBACTERIUM LONGUM 10MM CELL
4 CAPSULE ORAL
Qty: 30 | Refills: 3 | Status: ACTIVE | COMMUNITY
Start: 2022-07-18 | End: 1900-01-01

## 2022-07-18 RX ORDER — DICYCLOMINE HYDROCHLORIDE 10 MG/1
10 CAPSULE ORAL 3 TIMES DAILY
Qty: 90 | Refills: 2 | Status: ACTIVE | COMMUNITY
Start: 2022-07-18 | End: 1900-01-01

## 2022-07-18 RX ORDER — SIMETHICONE 180 MG
180 CAPSULE ORAL 4 TIMES DAILY
Qty: 120 | Refills: 3 | Status: ACTIVE | COMMUNITY
Start: 2022-07-18 | End: 1900-01-01

## 2022-07-18 NOTE — REASON FOR VISIT
The patient is a 51y Male complaining of multiple medical complaints. [Initial Evaluation] : an initial evaluation

## 2022-07-18 NOTE — ASSESSMENT
[FreeTextEntry1] : Abdominal Pain: The patient complains of abdominal pain. The patient is to avoid nonsteroidal anti-inflammatory drugs and aspirin.  I recommend a low FOD-MAP diet.  I recommend a trial of Dicyclomine 10 mg tablet PO 3 times a day PRN for the abdominal pain.\par Dyspepsia: The patient complains of dyspeptic symptoms.  The patient was advised to continue to abide by an anti-gas (low FOD-MAP) diet.  The patient was previously given a pamphlet for anti-gas (low FOD-MAP).  The patient and I reviewed the anti-gas (low FOD-MAP)diet at length again. The patient is to continue on a trial of Simethicone one tablet 4 times a day p.r.n. abdominal pain and gas.\par Constipation: The patient complains of constipation. I recommend a high-fiber diet. I recommend a trial of a probiotic such as Align once a day. I recommend a trial of Metamucil once a day for fiber supplementation. I recommend a trial of Miralax 1 packet once a day for the constipation. If the symptoms persist, the patient may require a colonoscopy to assess the symptoms.  The patient was told of the risks and benefits of the procedure.  The patient was told of the risks of perforation, emergency surgery, bleeding, infections and missed lesions.  The patient agreed and will followup to reassess the symptoms.  \par Diverticulitis: The patient has symptoms suggestive of acute diverticulitis. The patient may require an emergency room evaluation for diverticulitis if the symptoms persist.  The patient may require treatment with antibiotics if the symptoms persist. The patient may require treatment with a trial of Metronidazole 500 mg 3 times a day and Ciprofloxin 500mg twice a day for 2 weeks for the presumed acute diverticulitis.     I recommend a low residue diet.  I recommend a CAT scan of the abdomen and pelvis with IV contrast to assess the acute diverticulitis and possible complications. The patient was advised to go to the hospital if fever, chills, worsening abdominal pain or GI bleeding recurs.  The patient agrees.\par Blood Work: I recommend blood work to assess the patient's symptoms. I recommend a CBC, SMA 24, amylase, lipase, ESR, TFTs, NAYA, rheumatoid factor, celiac sprue panel, IgA, profile for hepatitis A, B, C. ,iron, TIBC, ferritin level and lipid profile.  I also recommend obtaining the recent blood work performed by the patient's PMD.\par Imaging Study: I recommend an imaging study to assess the symptoms. I recommend a CAT scan of the abdomen and pelvis with IV contrast to assess for diverticulitis.\par I recommend a stool guaiac to assess for occult blood in the stool.\par If the symptoms persist, the patient may require a colonoscopy to assess the colon.  The patient was told of the risks and benefits of the procedure.  The patient was told of the risks of perforation, emergency surgery, bleeding, infections and missed lesions.  The patient agreed and will follow-up to reassess the symptoms.  \par Follow-up: The patient is to follow-up in the office in 1 to 2 monthsto reassess the symptoms. The patient was told to call the office if any further problems.

## 2022-07-18 NOTE — HISTORY OF PRESENT ILLNESS
[None] : had no significant interval events [Heartburn] : denies heartburn [Nausea] : denies nausea [Vomiting] : denies vomiting [Diarrhea] : denies diarrhea [Constipation] : denies constipation [Yellow Skin Or Eyes (Jaundice)] : denies jaundice [Abdominal Pain] : denies abdominal pain [Rectal Pain] : denies rectal pain [Abdominal Swelling] : abdominal swelling [GERD] : no gastroesophageal reflux disease [Hiatus Hernia] : no hiatus hernia [Peptic Ulcer Disease] : no peptic ulcer disease [Pancreatitis] : no pancreatitis [Cholelithiasis] : no cholelithiasis [Kidney Stone] : no kidney stone [Inflammatory Bowel Disease] : no inflammatory bowel disease [Irritable Bowel Syndrome] : no irritable bowel syndrome [Diverticulitis] : no diverticulitis [Alcohol Abuse] : no alcohol abuse [Malignancy] : no malignancy [Abdominal Surgery] : no abdominal surgery [Appendectomy] : no appendectomy [Cholecystectomy] : no cholecystectomy [de-identified] : The patient is a 33-year-old female with past medical history significant for thalamic stroke, migraines, PFO and hypercholesterolemia who was referred to my office by Dr. Minh Cummins for abdominal pain, constipation, change in bowel habits, change in caliber of stool. I was asked to render an opinion for consultation for the above complaints.   The patient states that she is feeling uncomfortable x 3 weeks.  The patient complains of abdominal pain.  The patient describes the abdominal pain as a crampy, intermittent right lower quadrant discomfort that radiates to the back.  The abdominal pain is worse with meals and improves with passing gas or having a bowel movement.  The abdominal pain is described as moderate in nature.  The abdominal pain occurs at night and in the morning.  The abdominal pain can occur at any time.   The abdominal pain never has awakened the patient from sleep.  The abdominal pain is not relieved with any medications.  The patient denies any abdominal gas and bloating.  The patient denies any nausea or vomiting.  The patient denies any gastroesophageal reflux disease or dysphagia. The patient denies any atypical chest pain, shortness of breath or palpitations. The patient denies any diaphoresis. The patient complains of constipation but denies any diarrhea.  The patient has 1 bowel movement every 2 days. The patient complains of a change in bowel habits.  The patient complains of a change in caliber of stool.   The patient denies having mucus discharge with the bowel movements.  The patient denies any bright red blood per rectum, melena or hematemesis.  The patient denies any rectal pain or rectal pruritus. The patient denies any weight loss or anorexia.  She denies any fevers or chills.  The patient denies any jaundice or pruritus.  The patient complains of chronic lower back pain.  The patient denies ever having a prior upper endoscopy and colonoscopy performed by another gastroenterologist.  The patient's last menstrual period was in 2022. The patient's periods are irregular at 28 to 30 days.  The patient's menstrual periods are regular for 3 days.  The patient is a .  The patient's first menstrual period was at age 7. The patient denies any significant family history of GI problems.   [de-identified] : (-) smoking, (-) ETOH, (-) IVDA\par

## 2022-07-18 NOTE — REVIEW OF SYSTEMS
[Feeling Tired] : feeling tired [Constipation] : constipation [Easy Bruising] : a tendency for easy bruising [Negative] : Heme/Lymph

## 2022-07-19 ENCOUNTER — NON-APPOINTMENT (OUTPATIENT)
Age: 37
End: 2022-07-19

## 2022-07-20 ENCOUNTER — NON-APPOINTMENT (OUTPATIENT)
Age: 37
End: 2022-07-20

## 2022-07-22 ENCOUNTER — NON-APPOINTMENT (OUTPATIENT)
Age: 37
End: 2022-07-22

## 2022-07-25 ENCOUNTER — NON-APPOINTMENT (OUTPATIENT)
Age: 37
End: 2022-07-25

## 2022-07-25 LAB
ALBUMIN SERPL ELPH-MCNC: 4.6 G/DL
ALP BLD-CCNC: 64 U/L
ALT SERPL-CCNC: 14 U/L
AMYLASE/CREAT SERPL: 45 U/L
ANA PAT FLD IF-IMP: ABNORMAL
ANA SER IF-ACNC: ABNORMAL
ANION GAP SERPL CALC-SCNC: 10 MMOL/L
AST SERPL-CCNC: 12 U/L
BASOPHILS # BLD AUTO: 0.05 K/UL
BASOPHILS NFR BLD AUTO: 0.6 %
BILIRUB SERPL-MCNC: 0.6 MG/DL
BUN SERPL-MCNC: 13 MG/DL
CALCIUM SERPL-MCNC: 9.3 MG/DL
CHLORIDE SERPL-SCNC: 110 MMOL/L
CHOLEST SERPL-MCNC: 138 MG/DL
CO2 SERPL-SCNC: 20 MMOL/L
CREAT SERPL-MCNC: 0.9 MG/DL
EGFR: 84 ML/MIN/1.73M2
EOSINOPHIL # BLD AUTO: 0.21 K/UL
EOSINOPHIL NFR BLD AUTO: 2.4 %
ERYTHROCYTE [SEDIMENTATION RATE] IN BLOOD BY WESTERGREN METHOD: 23 MM/HR
FERRITIN SERPL-MCNC: 56 NG/ML
GGT SERPL-CCNC: 21 U/L
GLIADIN IGA SER QL: <5 UNITS
GLIADIN IGG SER QL: <5 UNITS
GLIADIN PEPTIDE IGA SER-ACNC: NEGATIVE
GLIADIN PEPTIDE IGG SER-ACNC: NEGATIVE
GLUCOSE SERPL-MCNC: 94 MG/DL
HBV CORE IGG+IGM SER QL: NONREACTIVE
HBV CORE IGM SER QL: NONREACTIVE
HBV E AB SER QL: NONREACTIVE
HBV E AG SER QL: NONREACTIVE
HBV SURFACE AB SER QL: NONREACTIVE
HBV SURFACE AG SER QL: NONREACTIVE
HCT VFR BLD CALC: 41.2 %
HCV AB SER QL: NONREACTIVE
HCV S/CO RATIO: 0.29 S/CO
HDLC SERPL-MCNC: 57 MG/DL
HEMOCCULT STL QL: NEGATIVE
HEPATITIS A IGG ANTIBODY: REACTIVE
HGB BLD-MCNC: 13.3 G/DL
IGA SER QL IEP: 301 MG/DL
IMM GRANULOCYTES NFR BLD AUTO: 0.2 %
IRON SATN MFR SERPL: 41 %
IRON SERPL-MCNC: 123 UG/DL
LDLC SERPL CALC-MCNC: 68 MG/DL
LPL SERPL-CCNC: 26 U/L
LYMPHOCYTES # BLD AUTO: 2.11 K/UL
LYMPHOCYTES NFR BLD AUTO: 24.1 %
MAN DIFF?: NORMAL
MCHC RBC-ENTMCNC: 30.6 PG
MCHC RBC-ENTMCNC: 32.3 GM/DL
MCV RBC AUTO: 94.7 FL
MONOCYTES # BLD AUTO: 0.68 K/UL
MONOCYTES NFR BLD AUTO: 7.8 %
NEUTROPHILS # BLD AUTO: 5.69 K/UL
NEUTROPHILS NFR BLD AUTO: 64.9 %
NONHDLC SERPL-MCNC: 81 MG/DL
PLATELET # BLD AUTO: 278 K/UL
POTASSIUM SERPL-SCNC: 4.1 MMOL/L
PROT SERPL-MCNC: 7.7 G/DL
RBC # BLD: 4.35 M/UL
RBC # FLD: 13.4 %
RHEUMATOID FACT SER QL: <10 IU/ML
SODIUM SERPL-SCNC: 140 MMOL/L
TIBC SERPL-MCNC: 301 UG/DL
TRIGL SERPL-MCNC: 65 MG/DL
TSH SERPL-ACNC: 1.26 UIU/ML
TTG IGA SER IA-ACNC: <1.2 U/ML
TTG IGA SER-ACNC: NEGATIVE
TTG IGG SER IA-ACNC: 1.3 U/ML
TTG IGG SER IA-ACNC: NEGATIVE
UIBC SERPL-MCNC: 178 UG/DL
WBC # FLD AUTO: 8.76 K/UL

## 2022-07-27 ENCOUNTER — NON-APPOINTMENT (OUTPATIENT)
Age: 37
End: 2022-07-27

## 2022-08-05 NOTE — ED PROVIDER NOTE - CADM POA CENTRAL LINE
[FreeTextEntry1] : Complete skin examination is negative for malignancy; Multiple new concerns were addressed and discussed.\par Therapeutic options and their risks and benefits; along with multiple diagnostic possibilities were discussed at length;\par risks and benefits of skin biopsy and/or other further study were discussed;\par \par Continue regular exams; \par Follow up for TBSE in 6 months - recent BCC 2/2021\par \par hypertrophic D&C scar;  c/o itching; \par Risks and benefits were discussed including including atrophy, discoloration\par Intralesional triamcinolone, concentration 10  mg/cc;\par Total volume   0.4   cc\par \par 
No

## 2022-08-12 NOTE — HISTORY OF PRESENT ILLNESS
[FreeTextEntry1] : Received a call from  that she has been upset with her inability to visit her sister who is unwell in [place]. Her mother passed away and her older daughter, whom she has not seen for 7 years is still in [facility]. They expect her daughter to be with them in the [place] because the visa has been approved. She appears to have cut back on quetiapine and is more paranoid argumentative \par Reports crampy abdominal pain

## 2022-08-12 NOTE — DISCUSSION/SUMMARY
[FreeTextEntry1] : Migraine intractable and thalamic stroke PFO ( closed) Encouraged to resume quetiapine conitnue zonisamide as recommended. Gastro consultation

## 2022-08-12 NOTE — PHYSICAL EXAM
[Person] : oriented to person [Place] : oriented to place [Time] : oriented to time [Concentration Intact] : normal concentrating ability [Visual Intact] : visual attention was ~T not ~L decreased [Naming Objects] : no difficulty naming common objects [Repeating Phrases] : no difficulty repeating a phrase [Writing A Sentence] : no difficulty writing a sentence [Fluency] : fluency intact [Comprehension] : comprehension intact [Reading] : reading intact [Past History] : adequate knowledge of personal past history [Cranial Nerves Optic (II)] : visual acuity intact bilaterally,  visual fields full to confrontation, pupils equal round and reactive to light [Cranial Nerves Oculomotor (III)] : extraocular motion intact [Cranial Nerves Trigeminal (V)] : facial sensation intact symmetrically [Cranial Nerves Facial (VII)] : face symmetrical [Cranial Nerves Vestibulocochlear (VIII)] : hearing was intact bilaterally [Cranial Nerves Glossopharyngeal (IX)] : tongue and palate midline [Cranial Nerves Accessory (XI - Cranial And Spinal)] : head turning and shoulder shrug symmetric [Cranial Nerves Hypoglossal (XII)] : there was no tongue deviation with protrusion [Motor Tone] : muscle tone was normal in all four extremities [Motor Strength] : muscle strength was normal in all four extremities [No Muscle Atrophy] : normal bulk in all four extremities [Sensation Tactile Decrease] : light touch was intact [Abnormal Walk] : normal gait [Balance] : balance was intact [Past-pointing] : there was no past-pointing [Tremor] : no tremor present [2+] : Ankle jerk left 2+ [Plantar Reflex Right Only] : normal on the right [Plantar Reflex Left Only] : normal on the left [Sclera] : the sclera and conjunctiva were normal [PERRL With Normal Accommodation] : pupils were equal in size, round, reactive to light, with normal accommodation [Extraocular Movements] : extraocular movements were intact [Outer Ear] : the ears and nose were normal in appearance [Oropharynx] : the oropharynx was normal [Neck Appearance] : the appearance of the neck was normal [Neck Cervical Mass (___cm)] : no neck mass was observed [Jugular Venous Distention Increased] : there was no jugular-venous distention [Thyroid Diffuse Enlargement] : the thyroid was not enlarged [Thyroid Nodule] : there were no palpable thyroid nodules [Heart Rate And Rhythm] : heart rate was normal and rhythm regular [Heart Sounds] : normal S1 and S2 [Heart Sounds Gallop] : no gallops [Murmurs] : no murmurs [Heart Sounds Pericardial Friction Rub] : no pericardial rub

## 2022-10-21 ENCOUNTER — APPOINTMENT (OUTPATIENT)
Dept: GASTROENTEROLOGY | Facility: CLINIC | Age: 37
End: 2022-10-21

## 2022-10-28 ENCOUNTER — APPOINTMENT (OUTPATIENT)
Dept: NEUROLOGY | Facility: CLINIC | Age: 37
End: 2022-10-28

## 2022-10-28 VITALS
WEIGHT: 186 LBS | TEMPERATURE: 97.9 F | HEIGHT: 67 IN | OXYGEN SATURATION: 99 % | HEART RATE: 62 BPM | SYSTOLIC BLOOD PRESSURE: 140 MMHG | BODY MASS INDEX: 29.19 KG/M2 | DIASTOLIC BLOOD PRESSURE: 99 MMHG

## 2022-10-28 PROCEDURE — 99214 OFFICE O/P EST MOD 30 MIN: CPT

## 2022-10-28 NOTE — HISTORY OF PRESENT ILLNESS
[FreeTextEntry1] : Reports she is using only one tablet of quetiapine but  reports she has stopped the medication on the advice of "Artemio Lewis" introduced to her by her disease denying sister as reported by her  who found the bottle of pills unused.\mag Remains very upset about her daughter who was left behind in [place] [facility] and has not been able to obtain papers to come to the [place]. She believes its the [place] Government that is impeding her daughter emigration but her  told me it is the normal wait time for [place] immigration visa to arrive.\par Regardless her mood and distress is worsened by the circumstance.

## 2022-10-28 NOTE — ASSESSMENT
[FreeTextEntry1] : Sequela of thalamic stroke; stable except where dysphoria and stress due to family circumstances. Will benefit from visit by daughter.

## 2022-10-28 NOTE — PHYSICAL EXAM
[Person] : oriented to person [Place] : oriented to place [Time] : oriented to time [Concentration Intact] : normal concentrating ability [Visual Intact] : visual attention was ~T not ~L decreased [Naming Objects] : no difficulty naming common objects [Repeating Phrases] : no difficulty repeating a phrase [Writing A Sentence] : no difficulty writing a sentence [Fluency] : fluency intact [Comprehension] : comprehension intact [Reading] : reading intact [Past History] : adequate knowledge of personal past history [Cranial Nerves Optic (II)] : visual acuity intact bilaterally,  visual fields full to confrontation, pupils equal round and reactive to light [Cranial Nerves Oculomotor (III)] : extraocular motion intact [Cranial Nerves Trigeminal (V)] : facial sensation intact symmetrically [Cranial Nerves Facial (VII)] : face symmetrical [Cranial Nerves Vestibulocochlear (VIII)] : hearing was intact bilaterally [Cranial Nerves Glossopharyngeal (IX)] : tongue and palate midline [Cranial Nerves Accessory (XI - Cranial And Spinal)] : head turning and shoulder shrug symmetric [Cranial Nerves Hypoglossal (XII)] : there was no tongue deviation with protrusion [Motor Tone] : muscle tone was normal in all four extremities [Motor Strength] : muscle strength was normal in all four extremities [No Muscle Atrophy] : normal bulk in all four extremities [Sensation Tactile Decrease] : light touch was intact [Abnormal Walk] : normal gait [Balance] : balance was intact [Past-pointing] : there was no past-pointing [Tremor] : no tremor present [2+] : Ankle jerk left 2+ [Plantar Reflex Right Only] : normal on the right [Plantar Reflex Left Only] : normal on the left [Sclera] : the sclera and conjunctiva were normal [PERRL With Normal Accommodation] : pupils were equal in size, round, reactive to light, with normal accommodation [Extraocular Movements] : extraocular movements were intact [Outer Ear] : the ears and nose were normal in appearance [Oropharynx] : the oropharynx was normal [Neck Appearance] : the appearance of the neck was normal [Neck Cervical Mass (___cm)] : no neck mass was observed [Jugular Venous Distention Increased] : there was no jugular-venous distention [Thyroid Diffuse Enlargement] : the thyroid was not enlarged [Thyroid Nodule] : there were no palpable thyroid nodules [Auscultation Breath Sounds / Voice Sounds] : lungs were clear to auscultation bilaterally [Full Pulse] : the pedal pulses are present [Edema] : there was no peripheral edema [Bowel Sounds] : normal bowel sounds [Abdomen Soft] : soft [Abdomen Tenderness] : non-tender [] : no hepato-splenomegaly [Abdomen Mass (___ Cm)] : no abdominal mass palpated [No CVA Tenderness] : no ~M costovertebral angle tenderness [No Spinal Tenderness] : no spinal tenderness

## 2022-10-28 NOTE — DISCUSSION/SUMMARY
[FreeTextEntry1] : Will request special consideration for her daughter to visit temporarily.\par Encouraged to use the prescribed medications. Follow up after 3 months

## 2022-12-16 ENCOUNTER — NON-APPOINTMENT (OUTPATIENT)
Age: 37
End: 2022-12-16

## 2022-12-22 NOTE — ED ADULT NURSE NOTE - CAS TRG GENERAL AIRWAY, MLM
12/22/2022  Charan García is a 42 y.o., male with atrial fibrillation here for LION and cardioversion.    Pre-operative evaluation for Procedure(s) (LRB):  Cardioversion or Defibrillation (N/A)  Transesophageal echo (LION) intra-procedure log documentation (N/A)        Patient Active Problem List   Diagnosis    Essential hypertension    Mixed hyperlipidemia    Obesity (BMI 30-39.9)    Gastroesophageal reflux disease without esophagitis    Type 2 diabetes mellitus without complication, without long-term current use of insulin    Hypertension complicating diabetes    Obesity, diabetes, and hypertension syndrome    Long term (current) use of anticoagulants    Morbid obesity due to excess calories    Persistent atrial fibrillation    MIRNA (obstructive sleep apnea)       Review of patient's allergies indicates:  No Known Allergies    No current facility-administered medications on file prior to encounter.     Current Outpatient Medications on File Prior to Encounter   Medication Sig Dispense Refill    pantoprazole (PROTONIX) 40 MG tablet Take 1 tablet (40 mg total) by mouth once daily. 30 tablet 1    blood sugar diagnostic Strp 1 strip by Misc.(Non-Drug; Combo Route) route once daily. 90 strip 4    [DISCONTINUED] metFORMIN (GLUCOPHAGE) 500 MG tablet Take 1 tablet (500 mg total) by mouth daily with breakfast. 90 tablet 3       Past Surgical History:   Procedure Laterality Date    CARDIOVERSION  09/22/2017       Social History     Socioeconomic History    Marital status:    Tobacco Use    Smoking status: Never    Smokeless tobacco: Never   Substance and Sexual Activity    Alcohol use: Yes     Comment: rare    Drug use: No    Sexual activity: Yes     Partners: Female         CBC: No results for input(s): WBC, RBC, HGB, HCT, PLT, MCV, MCH, MCHC in the last 72 hours.    CMP: No  results for input(s): NA, K, CL, CO2, BUN, CREATININE, GLU, MG, PHOS, CALCIUM, ALBUMIN, PROT, ALKPHOS, ALT, AST, BILITOT in the last 72 hours.    INR  No results for input(s): PT, INR, PROTIME, APTT in the last 72 hours.          2D Echo:  Results for orders placed or performed during the hospital encounter of 09/22/15   2D Echo w/ Color Flow Doppler   Result Value Ref Range    EF + QEF 55 55 - 65    Diastolic Dysfunction No     Mitral Valve Mobility NORMAL            Pre-op Assessment    I have reviewed the Patient Summary Reports.     I have reviewed the Nursing Notes.    I have reviewed the Medications.     Review of Systems  Anesthesia Hx:  No problems with previous Anesthesia    Hematology/Oncology:  Hematology Normal   Oncology Normal     EENT/Dental:EENT/Dental Normal   Cardiovascular:   Hypertension Dysrhythmias atrial fibrillation    Pulmonary:   Sleep Apnea    Renal/:  Renal/ Normal     Hepatic/GI:  Hepatic/GI Normal    Musculoskeletal:  Musculoskeletal Normal    Neurological:  Neurology Normal    Endocrine:   Diabetes    Dermatological:  Skin Normal    Psych:  Psychiatric Normal           Physical Exam  General: Well nourished    Airway:  Mallampati: II   Mouth Opening: Normal  TM Distance: Normal  Tongue: Normal  Neck ROM: Normal ROM    Dental:  Intact    Chest/Lungs:  Clear to auscultation, Normal Respiratory Rate    Heart:  Rate: Normal  Sounds: Normal        Anesthesia Plan  Type of Anesthesia, risks & benefits discussed:    Anesthesia Type: Gen Natural Airway  Intra-op Monitoring Plan: Standard ASA Monitors  Post Op Pain Control Plan: multimodal analgesia  Induction:  IV  Informed Consent: Informed consent signed with the Patient and all parties understand the risks and agree with anesthesia plan.  All questions answered.   ASA Score: 3    Ready For Surgery From Anesthesia Perspective.     .       Patent Myalgia Treatment: I explained this is common when taking isotretinoin. If this worsens they will contact us. They may try OTC ibuprofen.

## 2023-01-09 ENCOUNTER — APPOINTMENT (OUTPATIENT)
Dept: GASTROENTEROLOGY | Facility: CLINIC | Age: 38
End: 2023-01-09

## 2023-03-03 ENCOUNTER — APPOINTMENT (OUTPATIENT)
Dept: NEUROLOGY | Facility: CLINIC | Age: 38
End: 2023-03-03

## 2023-03-27 ENCOUNTER — NON-APPOINTMENT (OUTPATIENT)
Age: 38
End: 2023-03-27

## 2023-03-27 ENCOUNTER — APPOINTMENT (OUTPATIENT)
Dept: CARDIOLOGY | Facility: CLINIC | Age: 38
End: 2023-03-27
Payer: MEDICAID

## 2023-03-27 VITALS
SYSTOLIC BLOOD PRESSURE: 133 MMHG | HEART RATE: 79 BPM | BODY MASS INDEX: 27.47 KG/M2 | HEIGHT: 67 IN | DIASTOLIC BLOOD PRESSURE: 99 MMHG | OXYGEN SATURATION: 100 % | WEIGHT: 175 LBS

## 2023-03-27 DIAGNOSIS — Q21.1 ATRIAL SEPTAL DEFECT: ICD-10-CM

## 2023-03-27 DIAGNOSIS — E78.5 HYPERLIPIDEMIA, UNSPECIFIED: ICD-10-CM

## 2023-03-27 PROCEDURE — 99214 OFFICE O/P EST MOD 30 MIN: CPT | Mod: 25

## 2023-03-27 PROCEDURE — 93000 ELECTROCARDIOGRAM COMPLETE: CPT

## 2023-03-27 RX ORDER — ASPIRIN 81 MG/1
81 TABLET, CHEWABLE ORAL
Qty: 90 | Refills: 3 | Status: ACTIVE | COMMUNITY

## 2023-03-27 RX ORDER — POLYETHYLENE GLYCOL 3350 17 G/17G
17 POWDER, FOR SOLUTION ORAL TWICE DAILY
Qty: 50 | Refills: 3 | Status: DISCONTINUED | COMMUNITY
Start: 2020-04-10 | End: 2023-03-27

## 2023-03-27 RX ORDER — MAGNESIUM OXIDE 241.3 MG/1000MG
400 TABLET ORAL DAILY
Qty: 90 | Refills: 3 | Status: DISCONTINUED | COMMUNITY
Start: 2022-03-08 | End: 2023-03-27

## 2023-03-27 RX ORDER — ATORVASTATIN CALCIUM 40 MG/1
40 TABLET, FILM COATED ORAL
Qty: 90 | Refills: 3 | Status: DISCONTINUED | COMMUNITY
End: 2023-03-27

## 2023-04-16 PROBLEM — Q21.1 ASD (ATRIAL SEPTAL DEFECT): Status: ACTIVE | Noted: 2019-07-11

## 2023-04-16 PROBLEM — E78.5 HYPERLIPIDEMIA: Status: ACTIVE | Noted: 2019-07-16

## 2023-04-16 NOTE — DISCUSSION/SUMMARY
[FreeTextEntry1] : 1. C/w current management.\par 2. Repeat echo ordered today.\par 3. Old records requested and reviewed with performing physician.\par 4. Primary and secondary prevention of cardiovascular and related conditions discussed at length, including but not limited to diet and lifestyle modification.\par 5. Follow up in 6 months. [EKG obtained to assist in diagnosis and management of assessed problem(s)] : EKG obtained to assist in diagnosis and management of assessed problem(s)

## 2023-04-16 NOTE — HISTORY OF PRESENT ILLNESS
[FreeTextEntry1] : Ms. Junior is a 34 year-old woman with known CVA with evidence of an ASD/PFO. She is referred to be evaulated for closure. She is slowly recovering from her event however her  states that she is slower to respond than previously. \par \par Follow-up 3/27/23 - s/p PFO closure 7/2019. Stable from cardiac standpoint. No cardiac symptoms. Notes compliance with cardiac meds.

## 2023-04-16 NOTE — ASSESSMENT
[FreeTextEntry1] : Aphasic stroke\par PFO (patent foramen ovale) (745.5) (Q21.1)\par Thalamic stroke (434.91) (I63.9)\par Hyperlipidemia (272.4) (E78.5)

## 2023-05-21 NOTE — DISCUSSION/SUMMARY
[FreeTextEntry1] : Reviewed MRI of the head from December 2021.  There are no new abnormalities and residual sequelae of previous strokes.\par \par Recommend follow-up with cardiology for status of PFO repair.  Continue zonisamide atorvastatin and aspirin.  Investigate carpal tunnel syndrome causing her hand pain and forearm pain particularly in view of recent gaining weight.
Patient brought into ED by parent c/o fever on/off, runny nose, cough & abdominal pain x 2 days. Mother denies NVD.

## 2023-06-26 RX ORDER — ZONISAMIDE 50 MG/1
50 CAPSULE ORAL TWICE DAILY
Qty: 540 | Refills: 3 | Status: ACTIVE | COMMUNITY
Start: 2019-10-08 | End: 1900-01-01

## 2023-06-26 RX ORDER — QUETIAPINE FUMARATE 100 MG/1
100 TABLET ORAL
Qty: 180 | Refills: 3 | Status: ACTIVE | COMMUNITY
Start: 2020-07-20 | End: 1900-01-01

## 2023-06-26 RX ORDER — RIBOFLAVIN (VITAMIN B2) 400 MG
400 TABLET ORAL
Qty: 100 | Refills: 0 | Status: ACTIVE | COMMUNITY
Start: 2022-03-08 | End: 1900-01-01

## 2023-06-26 RX ORDER — ATORVASTATIN CALCIUM 10 MG/1
10 TABLET, FILM COATED ORAL DAILY
Qty: 90 | Refills: 3 | Status: ACTIVE | COMMUNITY
Start: 2022-04-16

## 2023-07-03 ENCOUNTER — APPOINTMENT (OUTPATIENT)
Dept: CV DIAGNOSITCS | Facility: HOSPITAL | Age: 38
End: 2023-07-03

## 2023-07-05 ENCOUNTER — APPOINTMENT (OUTPATIENT)
Dept: CARDIOLOGY | Facility: CLINIC | Age: 38
End: 2023-07-05

## 2023-07-06 ENCOUNTER — EMERGENCY (EMERGENCY)
Facility: HOSPITAL | Age: 38
LOS: 1 days | Discharge: HOME | End: 2023-07-06
Attending: EMERGENCY MEDICINE
Payer: MEDICAID

## 2023-07-06 VITALS
DIASTOLIC BLOOD PRESSURE: 83 MMHG | TEMPERATURE: 99 F | HEIGHT: 67 IN | OXYGEN SATURATION: 99 % | HEART RATE: 81 BPM | RESPIRATION RATE: 18 BRPM | SYSTOLIC BLOOD PRESSURE: 122 MMHG | WEIGHT: 210.1 LBS

## 2023-07-06 PROCEDURE — 70486 CT MAXILLOFACIAL W/O DYE: CPT | Mod: MA

## 2023-07-06 PROCEDURE — 70450 CT HEAD/BRAIN W/O DYE: CPT | Mod: 26,MA

## 2023-07-06 PROCEDURE — 99284 EMERGENCY DEPT VISIT MOD MDM: CPT

## 2023-07-06 PROCEDURE — 99284 EMERGENCY DEPT VISIT MOD MDM: CPT | Mod: 25

## 2023-07-06 PROCEDURE — 73030 X-RAY EXAM OF SHOULDER: CPT

## 2023-07-06 PROCEDURE — 73030 X-RAY EXAM OF SHOULDER: CPT | Mod: 26,RT

## 2023-07-06 PROCEDURE — 70486 CT MAXILLOFACIAL W/O DYE: CPT | Mod: 26,MA

## 2023-07-06 PROCEDURE — 70450 CT HEAD/BRAIN W/O DYE: CPT | Mod: MA

## 2023-07-06 RX ORDER — ACETAMINOPHEN 500 MG
650 TABLET ORAL ONCE
Refills: 0 | Status: COMPLETED | OUTPATIENT
Start: 2023-07-06 | End: 2023-07-06

## 2023-07-06 RX ORDER — ACETAMINOPHEN 500 MG
650 TABLET ORAL ONCE
Refills: 0 | Status: DISCONTINUED | OUTPATIENT
Start: 2023-07-06 | End: 2023-07-06

## 2023-07-06 RX ADMIN — Medication 650 MILLIGRAM(S): at 16:17

## 2023-07-06 NOTE — ED PROVIDER NOTE - HISTORY ATTESTATION, MLM
Form received in Medical Release for processing.  Please note that it takes 7-10 business days for completion.     I have reviewed and confirmed nurses' notes...

## 2023-07-06 NOTE — ED PROVIDER NOTE - PHYSICAL EXAMINATION
Pt is alert but lethargic. Ecchymosis around the pranav-orbital area. Swelling over the malar area. Swelling over the forehead.   No deformities. Pt is able to follow instructions.

## 2023-07-06 NOTE — ED PROVIDER NOTE - NSFOLLOWUPCLINICS_GEN_ALL_ED_FT
Oral & Maxillofacial Surgery  Department of Dental Medicine  [phone] [place] [address]  Phone: [phone]  Fax: [phone]  Scheduled Appointment: 7/10/2023

## 2023-07-06 NOTE — ED PROVIDER NOTE - NS ED ATTENDING STATEMENT MOD
I spoke with pt and scheduled her for a f/u appt with Campbell Nunn at the [facility] on 1/3 at 8:00  Pt was fine with this date, time and location 
Patient [facility] to go to [facility], but Malvina Severe said it wouldn't be  until the 15th  Patient states she want the [facility] option on 01/03/19 at 8am   She states Devyn did not mention doctor's name or suite number   Please call patient with the info  [phone]
Attending Only
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
## 2023-07-06 NOTE — ED PROVIDER NOTE - PATIENT PORTAL LINK FT
You can access the FollowMyHealth Patient Portal offered by [facility] by registering at the following website: [email or web address]. By joining [facility]’s FollowMyHealth portal, you will also be able to view your health information using other applications (apps) compatible with our system.

## 2023-07-06 NOTE — ED PROVIDER NOTE - NS_ATTENDINGSCRIBE_ED_ALL_ED
Patient/Other(s) I personally performed the service described in the documentation recorded by the scribe in my presence, and it accurately and completely records my words and actions.

## 2023-07-06 NOTE — ED PROVIDER NOTE - CARE PLAN
1 Principal Discharge DX:	Lamina papyracea fracture   Principal Discharge DX:	Lamina papyracea fracture  Secondary Diagnosis:	Contusion of right shoulder

## 2023-07-09 DIAGNOSIS — S09.93XD UNSPECIFIED INJURY OF FACE, SUBSEQUENT ENCOUNTER: ICD-10-CM

## 2023-09-18 ENCOUNTER — APPOINTMENT (OUTPATIENT)
Age: 38
End: 2023-09-18

## 2023-10-02 DIAGNOSIS — M25.561 PAIN IN RIGHT KNEE: ICD-10-CM

## 2023-10-02 DIAGNOSIS — M25.562 PAIN IN RIGHT KNEE: ICD-10-CM

## 2023-10-02 RX ORDER — ACETAMINOPHEN AND CODEINE PHOSPHATE 300; 30 MG/1; MG/1
300-30 TABLET ORAL
Qty: 25 | Refills: 0 | Status: ACTIVE | COMMUNITY
Start: 2023-07-09 | End: 1900-01-01

## 2023-10-04 ENCOUNTER — APPOINTMENT (OUTPATIENT)
Age: 38
End: 2023-10-04

## 2023-11-20 NOTE — DIETITIAN INITIAL EVALUATION ADULT. - PERTINENT LABORATORY DATA
06-10 Phos 3.2 mg/dL 06-09 Alb 3.8 g/dL 06-10 [id number] 5.6 % 06-10 Chol 202 mg/dL<H>  mg/dL HDL 41 mg/dL<L> Trig 130 mg/dL
Detail Level: Detailed
Render Note In Bullet Format When Appropriate: No
Show Applicator Variable?: Yes
Post-Care Instructions: I reviewed with the patient in detail post-care instructions. Patient is to wear sunprotection, and avoid picking at any of the treated lesions. Pt may apply Vaseline to crusted or scabbing areas.
Consent: The patient's consent was obtained including but not limited to risks of crusting, scabbing, blistering, scarring, darker or lighter pigmentary change, recurrence, incomplete removal and infection.
Application Tool (Optional): Liquid Nitrogen Sprayer
Duration Of Freeze Thaw-Cycle (Seconds): 0

## 2023-12-18 ENCOUNTER — APPOINTMENT (OUTPATIENT)
Dept: NEUROLOGY | Facility: CLINIC | Age: 38
End: 2023-12-18

## 2024-03-08 ENCOUNTER — NON-APPOINTMENT (OUTPATIENT)
Age: 39
End: 2024-03-08

## 2024-05-11 NOTE — H&P CARDIOLOGY - EKG AND INTERPRETATION
GASTROINTESTINAL REFLUX TREATMENT:  DIETARY AND OTHER MEASURES      GUIDELINES:  No bedtime snacking or eating for at least two hours before you lie down for the night.  Always take a drink to clear the esophagus after eating any food, particularly \"baked goods.\"  Quit smoking.  Eat smaller portions of fried, fatty, or spicy foods.  Eat them less often and eat them for lunch rather than dinner if possible.  Minimize caffeine or caffeine-like chemical intake by decreasing coffee, tea, cola, and chocolate. Limit your caffeine intake to early in the day if you can't avoid it.  Minimize citrus fruits or juices; other fruit juices are also acidic but less so. The more sour or \"tart\" the juice, the more acid it contains.  Avoid tomato based foods: spaghetti, lasagna, tomato juice, pizza, etc..  As above, when you do eat these foods try to eat them for breakfast or lunch rather than dinner.  Completely avoid: Peppermint, wintergreen or spearmint.  Avoid both candy and gum with these flavors.  Drink alcohol only with food and as little as possible.  Avoid carbonated beverages especially near bed time.  Elevate the head of your bed by 2-4 inches with books or blocks of wood.  Most of the damage from stomach acid is done during the night and if you can keep the acid down in your stomach, you will decrease your symptoms.  Do not take calcium containing antacids.  These relieve the symptoms but the calcium then causes the stomach to produce more acid which makes the symptoms come back sooner.      MEDICATIONS:  If you have symptoms of heartburn, try chewing Gaviscon (R) Extra Strength tablets which foam and coat the esophagus when swallowed.  If Gaviscon alone is not enough, Pepcid(R) (famotidine), and Zantac(R) (ranitidine) are available over-the-counter. These medicines can be taken with meals, preferable slightly before.  Prescription medications include Aciphex(R), Nexium(R), Prevacid(R), Prilosec(R), and  Protonix(R).  Prilosec(R) is now available over the counter.  These medications block the formation of stomach acid.  They work best when taken on an empty stomach (1-2 hours prior to supper or at bedtime.            NSR

## 2024-07-09 ENCOUNTER — NON-APPOINTMENT (OUTPATIENT)
Age: 39
End: 2024-07-09

## 2024-07-10 ENCOUNTER — APPOINTMENT (OUTPATIENT)
Dept: NEUROLOGY | Facility: CLINIC | Age: 39
End: 2024-07-10

## 2024-07-10 VITALS
OXYGEN SATURATION: 100 % | DIASTOLIC BLOOD PRESSURE: 85 MMHG | HEIGHT: 67 IN | BODY MASS INDEX: 31.08 KG/M2 | HEART RATE: 86 BPM | SYSTOLIC BLOOD PRESSURE: 136 MMHG | WEIGHT: 198 LBS | TEMPERATURE: 97.1 F

## 2024-07-10 PROCEDURE — 99213 OFFICE O/P EST LOW 20 MIN: CPT

## 2024-07-10 RX ORDER — LACTULOSE 10 G/15ML
20 SOLUTION ORAL
Qty: 3 | Refills: 3 | Status: ACTIVE | COMMUNITY
Start: 2024-07-10 | End: 1900-01-01
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
## 2024-09-18 ENCOUNTER — APPOINTMENT (OUTPATIENT)
Dept: NEUROLOGY | Facility: CLINIC | Age: 39
End: 2024-09-18
Payer: MEDICAID

## 2024-09-18 VITALS
SYSTOLIC BLOOD PRESSURE: 132 MMHG | TEMPERATURE: 98 F | HEART RATE: 89 BPM | OXYGEN SATURATION: 100 % | HEIGHT: 67 IN | BODY MASS INDEX: 30.76 KG/M2 | WEIGHT: 196 LBS | DIASTOLIC BLOOD PRESSURE: 89 MMHG

## 2024-09-18 PROCEDURE — 99214 OFFICE O/P EST MOD 30 MIN: CPT

## 2024-09-18 NOTE — HISTORY OF PRESENT ILLNESS
[FreeTextEntry1] : Diagnosed bilateral thalamic infarcts due artery of Percheron stroke . Found to have a large PFO and had a procedure for closure  Reports she is using only one tablet of quetiapine but  reports she has stopped the medication on the advice of "Artemio Lewis" introduced to her by her disease denying sister as reported by her  who found the bottle of pills unused. Remains very upset about her daughter who was left behind in [facility] and has not been able to obtain papers to come to the [place]. She believes its the [facility] that is impeding her daughter emigration but her  told me it is the normal wait time for [place] immigration visa to arrive. Regardless her mood and distress is worsened by the circumstance.  Despite reportedly compliance with the zonisamide she has developed daily constant headaches.  She was asked to take verapamil. She is reportedly compliant with quetiapine,  9/18/2024: Reportedly planning to travel to [facility] in February to see her daughter who she left behind. She is compliant with medications except verapamil and does not suffer as many headaches, seizures and has not suffered a recurrence of stroke

## 2024-09-18 NOTE — ASSESSMENT
[FreeTextEntry1] : Sequelae of bilateral midline thalamic stroke due to occlusion of artery of Percheron. Language disorder and Psychosis with paranoia now controlled with quetiapine. Migraine disorder controlled with zonisamide and verapamil; encouraged to resume verapamil. Continue all current medications and return for follow up after 3-4 months.

## 2024-09-18 NOTE — PHYSICAL EXAM
[Person] : oriented to person [Place] : oriented to place [Time] : oriented to time [Concentration Intact] : normal concentrating ability [Visual Intact] : visual attention was ~T not ~L decreased [Naming Objects] : no difficulty naming common objects [Repeating Phrases] : no difficulty repeating a phrase [Writing A Sentence] : no difficulty writing a sentence [Fluency] : fluency intact [Comprehension] : comprehension intact [Reading] : reading intact [Past History] : adequate knowledge of personal past history [Cranial Nerves Optic (II)] : visual acuity intact bilaterally,  visual fields full to confrontation, pupils equal round and reactive to light [Cranial Nerves Oculomotor (III)] : extraocular motion intact [Cranial Nerves Trigeminal (V)] : facial sensation intact symmetrically [Cranial Nerves Facial (VII)] : face symmetrical [Cranial Nerves Vestibulocochlear (VIII)] : hearing was intact bilaterally [Cranial Nerves Glossopharyngeal (IX)] : tongue and palate midline [Cranial Nerves Accessory (XI - Cranial And Spinal)] : head turning and shoulder shrug symmetric [Cranial Nerves Hypoglossal (XII)] : there was no tongue deviation with protrusion [Motor Strength] : muscle strength was normal in all four extremities [No Muscle Atrophy] : normal bulk in all four extremities [Sensation Tactile Decrease] : light touch was intact [Balance] : balance was intact [Past-pointing] : there was no past-pointing [Tremor] : no tremor present [2+] : Ankle jerk left 2+ [Plantar Reflex Right Only] : normal on the right [Plantar Reflex Left Only] : normal on the left [Sclera] : the sclera and conjunctiva were normal [PERRL With Normal Accommodation] : pupils were equal in size, round, reactive to light, with normal accommodation [Extraocular Movements] : extraocular movements were intact [Neck Appearance] : the appearance of the neck was normal [Neck Cervical Mass (___cm)] : no neck mass was observed [Jugular Venous Distention Increased] : there was no jugular-venous distention [Thyroid Diffuse Enlargement] : the thyroid was not enlarged [Thyroid Nodule] : there were no palpable thyroid nodules [Auscultation Breath Sounds / Voice Sounds] : lungs were clear to auscultation bilaterally [Heart Rate And Rhythm] : heart rate was normal and rhythm regular [Heart Sounds] : normal S1 and S2 [Heart Sounds Gallop] : no gallops [Murmurs] : no murmurs [Heart Sounds Pericardial Friction Rub] : no pericardial rub [Full Pulse] : the pedal pulses are present [Edema] : there was no peripheral edema [Bowel Sounds] : normal bowel sounds [Abdomen Soft] : soft [Abdomen Tenderness] : non-tender [] : no hepato-splenomegaly [Abdomen Mass (___ Cm)] : no abdominal mass palpated [Abnormal Walk] : normal gait [Nail Clubbing] : no clubbing  or cyanosis of the fingernails [Musculoskeletal - Swelling] : no joint swelling seen [Motor Tone] : muscle strength and tone were normal

## 2025-01-21 ENCOUNTER — APPOINTMENT (OUTPATIENT)
Dept: NEUROLOGY | Facility: CLINIC | Age: 40
End: 2025-01-21

## 2025-02-16 ENCOUNTER — EMERGENCY (EMERGENCY)
Facility: HOSPITAL | Age: 40
LOS: 1 days | Discharge: HOME | End: 2025-02-16
Attending: EMERGENCY MEDICINE
Payer: MEDICAID

## 2025-02-16 VITALS
HEART RATE: 85 BPM | WEIGHT: 195.33 LBS | HEIGHT: 67 IN | OXYGEN SATURATION: 100 % | SYSTOLIC BLOOD PRESSURE: 147 MMHG | TEMPERATURE: 98 F | DIASTOLIC BLOOD PRESSURE: 102 MMHG | RESPIRATION RATE: 18 BRPM

## 2025-02-16 LAB
ALBUMIN SERPL ELPH-MCNC: 3.9 G/DL — SIGNIFICANT CHANGE UP (ref 3.5–5)
ALP SERPL-CCNC: 81 U/L — SIGNIFICANT CHANGE UP (ref 40–120)
ALT FLD-CCNC: 23 U/L DA — SIGNIFICANT CHANGE UP (ref 10–60)
ANION GAP SERPL CALC-SCNC: 6 MMOL/L — SIGNIFICANT CHANGE UP (ref 5–17)
AST SERPL-CCNC: 12 U/L — SIGNIFICANT CHANGE UP (ref 10–40)
BASOPHILS # BLD AUTO: 0.07 K/UL — SIGNIFICANT CHANGE UP (ref 0–0.2)
BASOPHILS NFR BLD AUTO: 0.9 % — SIGNIFICANT CHANGE UP (ref 0–2)
BILIRUB SERPL-MCNC: 0.3 MG/DL — SIGNIFICANT CHANGE UP (ref 0.2–1.2)
BUN SERPL-MCNC: 8 MG/DL — SIGNIFICANT CHANGE UP (ref 7–18)
CALCIUM SERPL-MCNC: 9.1 MG/DL — SIGNIFICANT CHANGE UP (ref 8.4–10.5)
CHLORIDE SERPL-SCNC: 114 MMOL/L — HIGH (ref 96–108)
CK SERPL-CCNC: 125 U/L — SIGNIFICANT CHANGE UP (ref 21–215)
CO2 SERPL-SCNC: 23 MMOL/L — SIGNIFICANT CHANGE UP (ref 22–31)
CREAT SERPL-MCNC: 0.84 MG/DL — SIGNIFICANT CHANGE UP (ref 0.5–1.3)
D DIMER BLD IA.RAPID-MCNC: <150 NG/ML DDU — SIGNIFICANT CHANGE UP
EGFR: 89 ML/MIN/1.73M2 — SIGNIFICANT CHANGE UP
EOSINOPHIL # BLD AUTO: 0.43 K/UL — SIGNIFICANT CHANGE UP (ref 0–0.5)
EOSINOPHIL NFR BLD AUTO: 5.3 % — SIGNIFICANT CHANGE UP (ref 0–6)
GLUCOSE SERPL-MCNC: 90 MG/DL — SIGNIFICANT CHANGE UP (ref 70–99)
HCG SERPL-ACNC: <1 MIU/ML — SIGNIFICANT CHANGE UP
HCT VFR BLD CALC: 41.4 % — SIGNIFICANT CHANGE UP (ref 34.5–45)
HGB BLD-MCNC: 13.8 G/DL — SIGNIFICANT CHANGE UP (ref 11.5–15.5)
IMM GRANULOCYTES NFR BLD AUTO: 0.2 % — SIGNIFICANT CHANGE UP (ref 0–0.9)
LIDOCAIN IGE QN: 28 U/L — SIGNIFICANT CHANGE UP (ref 13–75)
LYMPHOCYTES # BLD AUTO: 2.49 K/UL — SIGNIFICANT CHANGE UP (ref 1–3.3)
LYMPHOCYTES # BLD AUTO: 30.9 % — SIGNIFICANT CHANGE UP (ref 13–44)
MAGNESIUM SERPL-MCNC: 2.3 MG/DL — SIGNIFICANT CHANGE UP (ref 1.6–2.6)
MCHC RBC-ENTMCNC: 30.5 PG — SIGNIFICANT CHANGE UP (ref 27–34)
MCHC RBC-ENTMCNC: 33.3 G/DL — SIGNIFICANT CHANGE UP (ref 32–36)
MCV RBC AUTO: 91.4 FL — SIGNIFICANT CHANGE UP (ref 80–100)
MONOCYTES # BLD AUTO: 0.76 K/UL — SIGNIFICANT CHANGE UP (ref 0–0.9)
MONOCYTES NFR BLD AUTO: 9.4 % — SIGNIFICANT CHANGE UP (ref 2–14)
NEUTROPHILS # BLD AUTO: 4.29 K/UL — SIGNIFICANT CHANGE UP (ref 1.8–7.4)
NEUTROPHILS NFR BLD AUTO: 53.3 % — SIGNIFICANT CHANGE UP (ref 43–77)
NRBC BLD AUTO-RTO: 0 /100 WBCS — SIGNIFICANT CHANGE UP (ref 0–0)
PLATELET # BLD AUTO: 284 K/UL — SIGNIFICANT CHANGE UP (ref 150–400)
POTASSIUM SERPL-MCNC: 3.9 MMOL/L — SIGNIFICANT CHANGE UP (ref 3.5–5.3)
POTASSIUM SERPL-SCNC: 3.9 MMOL/L — SIGNIFICANT CHANGE UP (ref 3.5–5.3)
PROT SERPL-MCNC: 8.5 G/DL — HIGH (ref 6–8.3)
RBC # BLD: 4.53 M/UL — SIGNIFICANT CHANGE UP (ref 3.8–5.2)
RBC # FLD: 13.7 % — SIGNIFICANT CHANGE UP (ref 10.3–14.5)
SODIUM SERPL-SCNC: 143 MMOL/L — SIGNIFICANT CHANGE UP (ref 135–145)
TROPONIN I, HIGH SENSITIVITY RESULT: <3 NG/L — SIGNIFICANT CHANGE UP
WBC # BLD: 8.06 K/UL — SIGNIFICANT CHANGE UP (ref 3.8–10.5)
WBC # FLD AUTO: 8.06 K/UL — SIGNIFICANT CHANGE UP (ref 3.8–10.5)

## 2025-02-16 PROCEDURE — 71045 X-RAY EXAM CHEST 1 VIEW: CPT | Mod: 26

## 2025-02-16 PROCEDURE — 99285 EMERGENCY DEPT VISIT HI MDM: CPT

## 2025-02-16 RX ORDER — ASPIRIN 81 MG/1
162 TABLET, COATED ORAL ONCE
Refills: 0 | Status: COMPLETED | OUTPATIENT
Start: 2025-02-16 | End: 2025-02-16

## 2025-02-16 RX ADMIN — ASPIRIN 162 MILLIGRAM(S): 81 TABLET, COATED ORAL at 23:05

## 2025-02-16 NOTE — ED ADULT TRIAGE NOTE - ADDITIONAL SAFETY/BANDS...
Case Management Discharge Note      Final Note: home with [facility]         Selected Continued Care - Discharged on 12/6/2020 Admission date: 12/4/2020 - Discharge disposition: Home-Health Care [facility]          Selected Continued Care - Prior Encounters Includes selections from prior encounters from 9/5/2020 to 12/6/2020    Discharged on 11/13/2020 Admission date: 11/11/2020 - Discharge disposition: Home or Self Care    Home Medical Care     Service Provider Selected Services Address Phone Fax Patient Preferred    [facility] [id number] [phone] --                         Final Discharge Disposition Code: 06 - home with home health care   Additional Safety/Bands:

## 2025-02-16 NOTE — ED ADULT TRIAGE NOTE - CHIEF COMPLAINT QUOTE
Pt reports that  she has chest pain  started left  side chest radiating  to neck and upper back pain  from 12 noon today with SOB .Dizziness . H/O congenital  heart disease fixed surgically with a device ,  stroke 6 year s ago . On ASA

## 2025-02-17 VITALS
TEMPERATURE: 98 F | SYSTOLIC BLOOD PRESSURE: 123 MMHG | HEART RATE: 79 BPM | RESPIRATION RATE: 18 BRPM | OXYGEN SATURATION: 100 % | DIASTOLIC BLOOD PRESSURE: 83 MMHG

## 2025-02-17 LAB
ERYTHROCYTE [SEDIMENTATION RATE] IN BLOOD: 14 MM/HR — SIGNIFICANT CHANGE UP (ref 0–15)
FLUAV AG NPH QL: SIGNIFICANT CHANGE UP
FLUBV AG NPH QL: SIGNIFICANT CHANGE UP
RSV RNA NPH QL NAA+NON-PROBE: SIGNIFICANT CHANGE UP
SARS-COV-2 RNA SPEC QL NAA+PROBE: SIGNIFICANT CHANGE UP
TROPONIN I, HIGH SENSITIVITY RESULT: 4.1 NG/L — SIGNIFICANT CHANGE UP

## 2025-02-17 PROCEDURE — 84484 ASSAY OF TROPONIN QUANT: CPT

## 2025-02-17 PROCEDURE — 71045 X-RAY EXAM CHEST 1 VIEW: CPT

## 2025-02-17 PROCEDURE — 83735 ASSAY OF MAGNESIUM: CPT

## 2025-02-17 PROCEDURE — 93005 ELECTROCARDIOGRAM TRACING: CPT

## 2025-02-17 PROCEDURE — 84702 CHORIONIC GONADOTROPIN TEST: CPT

## 2025-02-17 PROCEDURE — 99285 EMERGENCY DEPT VISIT HI MDM: CPT | Mod: 25

## 2025-02-17 PROCEDURE — 82550 ASSAY OF CK (CPK): CPT

## 2025-02-17 PROCEDURE — 85025 COMPLETE CBC W/AUTO DIFF WBC: CPT

## 2025-02-17 PROCEDURE — 83690 ASSAY OF LIPASE: CPT

## 2025-02-17 PROCEDURE — 85652 RBC SED RATE AUTOMATED: CPT

## 2025-02-17 PROCEDURE — 85379 FIBRIN DEGRADATION QUANT: CPT

## 2025-02-17 PROCEDURE — 96374 THER/PROPH/DIAG INJ IV PUSH: CPT

## 2025-02-17 PROCEDURE — 87637 SARSCOV2&INF A&B&RSV AMP PRB: CPT

## 2025-02-17 PROCEDURE — 80053 COMPREHEN METABOLIC PANEL: CPT

## 2025-02-17 PROCEDURE — 36415 COLL VENOUS BLD VENIPUNCTURE: CPT

## 2025-02-17 RX ORDER — FAMOTIDINE 10 MG/ML
20 INJECTION INTRAVENOUS ONCE
Refills: 0 | Status: ACTIVE | OUTPATIENT
Start: 2025-02-17 | End: 2025-02-17

## 2025-02-17 RX ORDER — KETOROLAC TROMETHAMINE 10 MG
30 TABLET ORAL ONCE
Refills: 0 | Status: DISCONTINUED | OUTPATIENT
Start: 2025-02-17 | End: 2025-02-17

## 2025-02-17 RX ADMIN — Medication 30 MILLIGRAM(S): at 02:19

## 2025-02-17 NOTE — ED ADULT NURSE NOTE - NSFALLUNIVINTERV_ED_ALL_ED
Bed/Stretcher in lowest position, wheels locked, appropriate side rails in place/Call bell, personal items and telephone in reach/Instruct patient to call for assistance before getting out of bed/chair/stretcher/Non-slip footwear applied when patient is off stretcher/[place] to call system/Physically safe environment - no spills, clutter or unnecessary equipment/Purposeful proactive rounding/Room/bathroom lighting operational, light cord in reach

## 2025-02-17 NOTE — ED PROVIDER NOTE - CARE PLAN
Principal Discharge DX:	Atypical chest pain   1 Principal Discharge DX:	Atypical chest pain  Secondary Diagnosis:	Viral syndrome

## 2025-02-17 NOTE — ED PROVIDER NOTE - NSFOLLOWUPINSTRUCTIONS_ED_ALL_ED_FT
Chest Pain    Chest pain can be caused by many different conditions which may or may not be dangerous. Causes include heartburn, lung infections, heart attack, blood clot in lungs, skin infections, strain or damage to muscle, cartilage, or bones, etc. In addition to a history and physical examination, an electrocardiogram (ECG) or other lab tests may have been performed to determine the cause of your chest pain. Follow up with your primary care provider or with a cardiologist as instructed.     SEEK IMMEDIATE MEDICAL CARE IF YOU HAVE ANY OF THE FOLLOWING SYMPTOMS: worsening chest pain, coughing up blood, unexplained back/neck/jaw pain, severe abdominal pain, dizziness or lightheadedness, fainting, shortness of breath, sweaty or clammy skin, vomiting, or racing heart beat. These symptoms may represent a serious problem that is an emergency. Do not wait to see if the symptoms will go away. Get medical help right away. Call 911 and do not drive yourself to the hospital.       you should follow-up with your medical doctor   if chest pain persists, you should see a cardiologist

## 2025-02-17 NOTE — ED PROVIDER NOTE - CLINICAL SUMMARY MEDICAL DECISION MAKING FREE TEXT BOX
39-year-old female with complaining of left-sided chest pain radiated to her back, worse with movement and deep inspiration. patient mostly with atypical chest pain.  However, family history of CAD, also has IUD, will get labs, troponin, D-dimers, concern for ACS, PE and reassess 39-year-old female with complaining of left-sided chest pain radiated to her back, worse with movement and deep inspiration. patient mostly with atypical chest pain.  However, family history of CAD, also has IUD, will get labs, troponin, D-dimers, concern for ACS, PE and reassess  Patient also with coughing, nasal congestion, requesting for viral PCR

## 2025-02-17 NOTE — ED PROVIDER NOTE - PROGRESS NOTE DETAILS
Labs/EKG/CXR explained to pt &   Pt c/o Lt sided HA, will give Toradol  Pt with neg trop, 2 sets of troponins are negative, patient feeling much better, will discharge home   advised to follow-up with PCP, also cardiology

## 2025-02-17 NOTE — ED PROVIDER NOTE - PATIENT PORTAL LINK FT
You can access the FollowMyHealth Patient Portal offered by [facility] by registering at the following website: [email or web address]. By joining [facility]’s FollowMyHealth portal, you will also be able to view your health information using other applications (apps) compatible with our system.

## 2025-02-17 NOTE — ED PROVIDER NOTE - OBJECTIVE STATEMENT
39-year-old female with history of CVA, due to congenital heart disease, patient had cardiac repair, migraine headache, allergic to PCN with rash and SOB, LMP unknown patient has IUD.  She complaining of constant left-sided chest pain radiates to her left upper back since last night, worse with deep inspiration and movement, associated with SOB, mild sweating, palpitation.  Patient also endorsed with coughing, nasal congestion.  She denies fever, chills, recent traveling, sick contact,  Lightheadedness, N/V. she describes pain as sharp, 8/10.  Family history of CAD, both parents  in the 50s from [place]

## 2025-03-05 NOTE — ED ADULT NURSE NOTE - CADM POA URETHRAL CATHETER
Some residual defecit   Recommended taping overnight  Recommended pm and am eye drop options   F//u in a couple months for cpe, can monitor at that time   No

## 2025-03-10 ENCOUNTER — NON-APPOINTMENT (OUTPATIENT)
Age: 40
End: 2025-03-10

## 2025-03-10 ENCOUNTER — APPOINTMENT (OUTPATIENT)
Dept: NEUROLOGY | Facility: CLINIC | Age: 40
End: 2025-03-10
Payer: MEDICAID

## 2025-03-10 VITALS
BODY MASS INDEX: 29.98 KG/M2 | OXYGEN SATURATION: 95 % | SYSTOLIC BLOOD PRESSURE: 146 MMHG | HEIGHT: 67 IN | HEART RATE: 95 BPM | WEIGHT: 191 LBS | TEMPERATURE: 98 F | DIASTOLIC BLOOD PRESSURE: 96 MMHG

## 2025-03-10 PROCEDURE — 99213 OFFICE O/P EST LOW 20 MIN: CPT

## 2025-04-14 ENCOUNTER — APPOINTMENT (OUTPATIENT)
Dept: CARDIOLOGY | Facility: CLINIC | Age: 40
End: 2025-04-14

## 2025-04-14 ENCOUNTER — NON-APPOINTMENT (OUTPATIENT)
Age: 40
End: 2025-04-14

## 2025-04-14 VITALS
HEIGHT: 67 IN | HEART RATE: 79 BPM | SYSTOLIC BLOOD PRESSURE: 148 MMHG | OXYGEN SATURATION: 100 % | DIASTOLIC BLOOD PRESSURE: 84 MMHG

## 2025-04-14 PROCEDURE — 99214 OFFICE O/P EST MOD 30 MIN: CPT

## 2025-04-14 PROCEDURE — 93000 ELECTROCARDIOGRAM COMPLETE: CPT

## 2025-04-14 PROCEDURE — G2211 COMPLEX E/M VISIT ADD ON: CPT | Mod: NC

## 2025-06-23 PROBLEM — Q21.10 ASD (ATRIAL SEPTAL DEFECT): Status: ACTIVE | Noted: 2019-07-11

## 2025-07-16 NOTE — ED ADULT NURSE NOTE - NSFALLUNIVINTERV_ED_ALL_ED
"    Called and spoke to Patient after verifying last name and date of birth.  Pt was informed of provider response. Pt replied, \"I just saw Dr. Shira Chadwick (in May). I have one day left.\" She is requesting refill and willing to be seen after, if needed.    Loco Velázquez RN .............. 7/15/2025  4:21 PM  "
Called and spoke to Patient after verifying last name and date of birth. Pt informed of provider response, and she was transferred to scheduling line, to set up an appointment:    Future Appointments 7/16/2025 - 1/12/2026        Date Visit Type Length Department Provider     7/16/2025 10:45 AM OFFICE VISIT 30 min  FAMILY PRACTICE Telma Newton MD    Location Instructions:     [facility] is located [address] and south of [facility].              7/16/2025  3:30 PM MA SCREENING BILATERAL W/ YUSUF 15 min  MAMMOGRAPHY MA2    Location Instructions:     [facility] is located [address] of [address] and [address] [facility] [address].              7/22/2025  4:00 PM PT TREATMENT 45 min  PT OUTPATIENT Jimbo Khan PT    Location Instructions:     Our clinic is located at: [facility] [address] [place] [address]  How to find our clinic: Enter the [facility] under the large awing on the south side of the building.  Parking: Free parking is available  Questions or to Reschedule: Contact our clinic: [phone].               8/4/2025  2:15 PM NEW 30 min  ORTHOPEDIC SURGERY Shmuel Woodall MD    Location Instructions:     [facility] is located [address] of [address] and [address] [facility] [address].                   Loco Velázquez RN .............. 7/16/2025  10:38 AM            
Controlled substance requires an office visit for consideration. Otherwise would defer to pcp   
Reason for call: Medication or medication refill    Have you contacted your pharmacy regarding this refill? yes    Name of medication requested: Lorazepam    How many days of medication do you have left? 1 day    What pharmacy do you use? [facility]    Preferred method for responding to this message: Telephone Call    Phone number patient can be reached at: Cell number on file:    Telephone Information:   Mobile [phone]       If we cannot reach you directly, may we leave a detailed response at the number you provided? Yes    Ana Hernandez on 7/15/2025 at 3:47 PM     
Bed/Stretcher in lowest position, wheels locked, appropriate side rails in place/Call bell, personal items and telephone in reach/Instruct patient to call for assistance before getting out of bed/chair/stretcher/Non-slip footwear applied when patient is off stretcher/[place] to call system/Physically safe environment - no spills, clutter or unnecessary equipment/Purposeful proactive rounding/Room/bathroom lighting operational, light cord in reach

## 2025-07-28 ENCOUNTER — APPOINTMENT (OUTPATIENT)
Dept: NEUROLOGY | Facility: CLINIC | Age: 40
End: 2025-07-28
Payer: MEDICAID

## 2025-07-28 VITALS
WEIGHT: 189 LBS | SYSTOLIC BLOOD PRESSURE: 132 MMHG | TEMPERATURE: 97 F | RESPIRATION RATE: 17 BRPM | HEART RATE: 75 BPM | HEIGHT: 67 IN | DIASTOLIC BLOOD PRESSURE: 91 MMHG | BODY MASS INDEX: 29.66 KG/M2 | OXYGEN SATURATION: 95 %

## 2025-07-28 PROCEDURE — G2212 PROLONG OUTPT/OFFICE VIS: CPT

## 2025-07-28 PROCEDURE — 99215 OFFICE O/P EST HI 40 MIN: CPT

## 2025-09-15 ENCOUNTER — APPOINTMENT (OUTPATIENT)
Dept: NEUROLOGY | Facility: CLINIC | Age: 40
End: 2025-09-15